# Patient Record
Sex: MALE | Race: WHITE | NOT HISPANIC OR LATINO | Employment: FULL TIME | URBAN - METROPOLITAN AREA
[De-identification: names, ages, dates, MRNs, and addresses within clinical notes are randomized per-mention and may not be internally consistent; named-entity substitution may affect disease eponyms.]

---

## 2019-10-08 ENCOUNTER — OFFICE VISIT (OUTPATIENT)
Dept: FAMILY MEDICINE | Facility: CLINIC | Age: 24
End: 2019-10-08
Payer: COMMERCIAL

## 2019-10-08 ENCOUNTER — LAB VISIT (OUTPATIENT)
Dept: LAB | Facility: HOSPITAL | Age: 24
End: 2019-10-08
Attending: FAMILY MEDICINE
Payer: COMMERCIAL

## 2019-10-08 VITALS
HEART RATE: 76 BPM | BODY MASS INDEX: 45.1 KG/M2 | OXYGEN SATURATION: 98 % | DIASTOLIC BLOOD PRESSURE: 78 MMHG | TEMPERATURE: 98 F | WEIGHT: 315 LBS | SYSTOLIC BLOOD PRESSURE: 126 MMHG | HEIGHT: 70 IN

## 2019-10-08 DIAGNOSIS — R63.5 ABNORMAL WEIGHT GAIN: ICD-10-CM

## 2019-10-08 DIAGNOSIS — D72.828 OTHER ELEVATED WHITE BLOOD CELL (WBC) COUNT: Primary | ICD-10-CM

## 2019-10-08 DIAGNOSIS — D72.828 OTHER ELEVATED WHITE BLOOD CELL (WBC) COUNT: ICD-10-CM

## 2019-10-08 DIAGNOSIS — E66.01 CLASS 3 SEVERE OBESITY DUE TO EXCESS CALORIES WITHOUT SERIOUS COMORBIDITY WITH BODY MASS INDEX (BMI) OF 45.0 TO 49.9 IN ADULT: ICD-10-CM

## 2019-10-08 DIAGNOSIS — R06.81 APNEA: ICD-10-CM

## 2019-10-08 DIAGNOSIS — R20.0 NUMBNESS AND TINGLING IN BOTH HANDS: ICD-10-CM

## 2019-10-08 DIAGNOSIS — R20.2 NUMBNESS AND TINGLING IN BOTH HANDS: ICD-10-CM

## 2019-10-08 DIAGNOSIS — R74.8 ABNORMAL LIVER ENZYMES: ICD-10-CM

## 2019-10-08 DIAGNOSIS — Z13.6 SCREENING FOR CARDIOVASCULAR CONDITION: ICD-10-CM

## 2019-10-08 DIAGNOSIS — R06.83 SNORING: ICD-10-CM

## 2019-10-08 PROBLEM — D72.829 LEUCOCYTOSIS: Status: ACTIVE | Noted: 2019-10-08

## 2019-10-08 PROBLEM — E66.813 CLASS 3 SEVERE OBESITY DUE TO EXCESS CALORIES WITHOUT SERIOUS COMORBIDITY WITH BODY MASS INDEX (BMI) OF 45.0 TO 49.9 IN ADULT: Status: ACTIVE | Noted: 2019-10-08

## 2019-10-08 LAB
ALBUMIN SERPL BCP-MCNC: 4.3 G/DL (ref 3.5–5.2)
ALP SERPL-CCNC: 43 U/L (ref 55–135)
ALT SERPL W/O P-5'-P-CCNC: 76 U/L (ref 10–44)
ANION GAP SERPL CALC-SCNC: 9 MMOL/L (ref 8–16)
AST SERPL-CCNC: 43 U/L (ref 10–40)
BASOPHILS # BLD AUTO: 0.04 K/UL (ref 0–0.2)
BASOPHILS NFR BLD: 0.4 % (ref 0–1.9)
BILIRUB SERPL-MCNC: 0.5 MG/DL (ref 0.1–1)
BUN SERPL-MCNC: 17 MG/DL (ref 6–20)
CALCIUM SERPL-MCNC: 9.8 MG/DL (ref 8.7–10.5)
CHLORIDE SERPL-SCNC: 103 MMOL/L (ref 95–110)
CHOLEST SERPL-MCNC: 191 MG/DL (ref 120–199)
CHOLEST/HDLC SERPL: 6.2 {RATIO} (ref 2–5)
CO2 SERPL-SCNC: 28 MMOL/L (ref 23–29)
CREAT SERPL-MCNC: 1.1 MG/DL (ref 0.5–1.4)
DIFFERENTIAL METHOD: ABNORMAL
EOSINOPHIL # BLD AUTO: 0.3 K/UL (ref 0–0.5)
EOSINOPHIL NFR BLD: 3 % (ref 0–8)
ERYTHROCYTE [DISTWIDTH] IN BLOOD BY AUTOMATED COUNT: 13 % (ref 11.5–14.5)
EST. GFR  (AFRICAN AMERICAN): >60 ML/MIN/1.73 M^2
EST. GFR  (NON AFRICAN AMERICAN): >60 ML/MIN/1.73 M^2
FOLATE SERPL-MCNC: 4.6 NG/ML (ref 4–24)
GLUCOSE SERPL-MCNC: 101 MG/DL (ref 70–110)
HCT VFR BLD AUTO: 46.4 % (ref 40–54)
HDLC SERPL-MCNC: 31 MG/DL (ref 40–75)
HDLC SERPL: 16.2 % (ref 20–50)
HGB BLD-MCNC: 14.7 G/DL (ref 14–18)
IMM GRANULOCYTES # BLD AUTO: 0.03 K/UL (ref 0–0.04)
IMM GRANULOCYTES NFR BLD AUTO: 0.3 % (ref 0–0.5)
INSULIN COLLECTION INTERVAL: NORMAL
INSULIN SERPL-ACNC: 21 UU/ML
LDLC SERPL CALC-MCNC: 122.6 MG/DL (ref 63–159)
LYMPHOCYTES # BLD AUTO: 2.7 K/UL (ref 1–4.8)
LYMPHOCYTES NFR BLD: 24.5 % (ref 18–48)
MCH RBC QN AUTO: 29.5 PG (ref 27–31)
MCHC RBC AUTO-ENTMCNC: 31.7 G/DL (ref 32–36)
MCV RBC AUTO: 93 FL (ref 82–98)
MONOCYTES # BLD AUTO: 0.7 K/UL (ref 0.3–1)
MONOCYTES NFR BLD: 6.5 % (ref 4–15)
NEUTROPHILS # BLD AUTO: 7.3 K/UL (ref 1.8–7.7)
NEUTROPHILS NFR BLD: 65.3 % (ref 38–73)
NONHDLC SERPL-MCNC: 160 MG/DL
NRBC BLD-RTO: 0 /100 WBC
PLATELET # BLD AUTO: 271 K/UL (ref 150–350)
PMV BLD AUTO: 10.7 FL (ref 9.2–12.9)
POTASSIUM SERPL-SCNC: 4.5 MMOL/L (ref 3.5–5.1)
PROT SERPL-MCNC: 8.1 G/DL (ref 6–8.4)
RBC # BLD AUTO: 4.99 M/UL (ref 4.6–6.2)
SODIUM SERPL-SCNC: 140 MMOL/L (ref 136–145)
TRIGL SERPL-MCNC: 187 MG/DL (ref 30–150)
TSH SERPL DL<=0.005 MIU/L-ACNC: 2.06 UIU/ML (ref 0.4–4)
VIT B12 SERPL-MCNC: 284 PG/ML (ref 210–950)
WBC # BLD AUTO: 11.16 K/UL (ref 3.9–12.7)

## 2019-10-08 PROCEDURE — 99999 PR PBB SHADOW E&M-NEW PATIENT-LVL III: ICD-10-PCS | Mod: PBBFAC,,, | Performed by: FAMILY MEDICINE

## 2019-10-08 PROCEDURE — 3008F PR BODY MASS INDEX (BMI) DOCUMENTED: ICD-10-PCS | Mod: CPTII,S$GLB,, | Performed by: FAMILY MEDICINE

## 2019-10-08 PROCEDURE — 82746 ASSAY OF FOLIC ACID SERUM: CPT

## 2019-10-08 PROCEDURE — 83525 ASSAY OF INSULIN: CPT

## 2019-10-08 PROCEDURE — 82607 VITAMIN B-12: CPT

## 2019-10-08 PROCEDURE — 3008F BODY MASS INDEX DOCD: CPT | Mod: CPTII,S$GLB,, | Performed by: FAMILY MEDICINE

## 2019-10-08 PROCEDURE — 99203 PR OFFICE/OUTPT VISIT, NEW, LEVL III, 30-44 MIN: ICD-10-PCS | Mod: S$GLB,,, | Performed by: FAMILY MEDICINE

## 2019-10-08 PROCEDURE — 85025 COMPLETE CBC W/AUTO DIFF WBC: CPT

## 2019-10-08 PROCEDURE — 99999 PR PBB SHADOW E&M-NEW PATIENT-LVL III: CPT | Mod: PBBFAC,,, | Performed by: FAMILY MEDICINE

## 2019-10-08 PROCEDURE — 80053 COMPREHEN METABOLIC PANEL: CPT

## 2019-10-08 PROCEDURE — 99203 OFFICE O/P NEW LOW 30 MIN: CPT | Mod: S$GLB,,, | Performed by: FAMILY MEDICINE

## 2019-10-08 PROCEDURE — 84443 ASSAY THYROID STIM HORMONE: CPT

## 2019-10-08 PROCEDURE — 80061 LIPID PANEL: CPT

## 2019-10-08 PROCEDURE — 36415 COLL VENOUS BLD VENIPUNCTURE: CPT | Mod: PO

## 2019-10-08 NOTE — PATIENT INSTRUCTIONS
Please follow the instructions below to securely access your online medical record. [facility] allows you to send messages to your doctor, view your test results, renew your prescriptions, schedule appointments, and more.     How Do I Sign Up?  1. In your Internet browser, go to [email or web address]  2. In the lower right of the page, click the Activate Now link located under the Have Access Code? .  3. Enter your [facility] Access Code exactly as it appears below. You will not need to use this code after youve completed the sign-up process.  [facility] Access Code: [email or web address]  Expires: 11/22/2019  8:40 AM    4. Enter Date of Birth (mm/dd/yyyy) as indicated and click the Next button. You will be taken to the next sign-up page.  5. Create a [facility] ID. This will be your new [facility] login ID and cannot be changed, so think of one that is secure and easy to remember.  6. Create a [facility] password.  Your password must be at least 8 characters long and contain at least 1 letter and 1 number.  You can change your password at any time.  Your password is case sensitive.  7. Enter your Password Reset Question and Answer, then click the Next button.   8. Enter your e-mail address. You will receive e-mail notification when new information is available in [facility].  9. Click Sign Up. You can now view your medical record.     Additional Information  If you have questions, you can e-mail [email or web address] or call [phone] to talk to our [facility] staff. Remember, [facility] is NOT to be used for urgent needs. For medical emergencies, dial 911.    Thank you for enrolling in [facility].         Eating Heart-Healthy Foods  Eating has a big impact on your heart health. In fact, eating healthier can improve several of your heart risks at once. For instance, it helps you manage weight, cholesterol, and blood pressure. Here are ideas to help you make heart-healthy changes without giving up all the foods  and flavors you love.  Getting started  · Talk with your health care provider about eating plans, such as the DASH or Mediterranean diet. You may also be referred to a dietitian.  · Change a few things at a time. Give yourself time to get used to a few eating changes before adding more.  · Work to create a tasty, healthy eating plan that you can stick to for the rest of your life.    Goals for healthy eating  Below are some tips to improve your eating habits:  · Limit saturated fats and trans fats. Saturated fats raise your levels of cholesterol, so keep these fats to a minimum. They are found in foods such as fatty meats, whole milk, cheese, and palm and coconut oils. Avoid trans fats because they lower good cholesterol as well as raise bad cholesterol. Trans fats are most often found in processed foods.  · Reduce sodium (salt) intake. Eating too much salt may increase your blood pressure. Limit your sodium intake to 2,300 milligrams (mg) per day, or less if your health care provider recommends it. Dining out less often and eating fewer processed foods are two great ways to decrease the amount of salt you consume.  · Managing calories. A calorie is a unit of energy. Your body burns calories for fuel, but if you eat more calories than your body burns, the extras are stored as fat. Your health care provider can help you create a diet plan to manage your calories. This will likely include eating healthier foods as well as exercising regularly. To help you track your progress, keep a diary to record what you eat and how often you exercise.  Choose the right foods  Aim to make these foods staples of your diet. If you have diabetes, you may have different recommendations than what is listed here:  · Fruits and vegetable provide plenty of nutrients without a lot of calories. At meals, fill half your plate with these foods. Split the other half of your plate between whole grains and lean protein.  · Whole grains are high  in fiber and rich in vitamins and nutrients. Good choices include whole-wheat bread, pasta, and brown rice.  · Lean proteins give you nutrition with less fat. Good choices include fish, skinless chicken, and beans.  · Low-fat or nonfat dairy provides nutrients without a lot of fat. Try low-fat or nonfat milk, cheese, or yogurt.  · Healthy fats can be good for you in small amounts. These are unsaturated fats, such as olive oil, nuts, and fish. Try to have at least 2 servings per week of fatty fish such as salmon, sardines, mackerel, rainbow trout, and albacore tuna. These contain omega-3 fatty acids, which are good for your heart. Flaxseed is another source of a heart-healthy fat.  More on heart healthy eating    Read food labels  Healthy eating starts at the grocery store. Be sure to pay attention to food labels on packaged foods. Look for products that are high in fiber and protein, and low in saturated fat, cholesterol, and sodium. Avoid products that contain trans fat. And pay close attention to serving size. For instance, if you plan to eat two servings, double all the numbers on the label.  Prepare food right  A key part of healthy cooking is cutting down on added fat and salt. Look on the internet for lower-fat, lower-sodium recipes. Also, try these tips:  · Remove fat from meat and skin from poultry before cooking.  · Skim fat from the surface of soups and sauces.  · Broil, boil, bake, steam, grill, and microwave food without added fats.  · Choose ingredients that spice up your food without adding calories, fat, or sodium. Try these items: horseradish, hot sauce, lemon, mustard, nonfat salad dressings, and vinegar. For salt-free herbs and spices, try basil, cilantro, cinnamon, pepper, and rosemary.  Date Last Reviewed: 6/25/2015  © 5612-7674 [employer]. [address] [place] [address], [place] [address]. All rights reserved. This information is not intended as a substitute for professional medical care.  Always follow your healthcare professional's instructions.        Eating Heart-Healthy Food: Using the DASH Plan    Eating for your heart doesnt have to be hard or boring. You just need to know how to make healthier choices. The DASH eating plan has been developed to help you do just that. DASH stands for Dietary Approaches to Stop Hypertension. It is a plan that has been proven to be healthier for your heart and to lower your risk for high blood pressure. It can also help lower your risk for cancer, heart disease, osteoporosis, and diabetes.  Choosing from each food group  Choose foods from each of the food groups below each day. Try to get the recommended number of servings for each food group. The serving numbers are based on a diet of 2,000 calories a day. Talk to your doctor if youre unsure about your calorie needs. Along with getting the correct servings, the DASH plan also recommends a sodium intake less than 2,300 mg per day.        Grains  Servings: 6 to 8 a day  A serving is:  · 1 slice bread  · 1 ounce dry cereal  · Half a cup cooked rice, pasta or cereal  Best choices: Whole grains and any grains high in fiber. Vegetables  Servings: 4 to 5 a day  A serving is:  · 1 cup raw leafy vegetable  · Half a cup cut-up raw or cooked vegetable  · Half a cup vegetable juice  Best choices: Fresh or frozen vegetables prepared without added salt or fat.   Fruits  Servings: 4 to 5 a day  A serving is:  · 1 medium fruit  · One-quarter cup dried fruit  · Half a cup fresh, frozen, or canned fruit  · Half a cup of 100% fruit juices  Best choices: A variety of fresh fruits of different colors. Whole fruits are a better choice than fruit juices. Low-fat or fat-free dairy  Servings: 2 to 3 a day  A serving is:  · 1 cup milk  · 1 cup yogurt  · One and a half ounces cheese  Best choices: Skim or 1% milk, low-fat or fat-free yogurt or buttermilk, and low-fat cheeses.         Lean meats, poultry, fish  Servings: 6 or fewer a  day  A serving is:  · 1 ounce cooked meats, poultry, or fish  · 1 egg  Best choices: Lean poultry and fish. Trim away visible fat. Broil, grill, roast, or boil instead of frying. Remove skin from poultry before eating. Limit how much red meat you eat.  Nuts, seeds, beans  Servings: 4 to 5 a week  A serving is:  · One-third cup nuts (one and a half ounces)  · 2 tablespoons nut butter or seeds  · Half a cup cooked dry beans or legumes  Best choices: Dry roasted nuts with no salt added, lentils, kidney beans, garbanzo beans, and whole ramírez beans.   Fats and oils  Servings: 2 to 3 a day  A serving is:  · 1 teaspoon vegetable oil  · 1 teaspoon soft margarine  · 1 tablespoon mayonnaise  · 2 tablespoons salad dressing  Best choices: Nut and vegetable oils (nontropical vegetable oils), such as olive and canola oil. Sweets  Servings: 5 a week or fewer  A serving is:  · 1 tablespoon sugar, maple syrup, or honey  · 1 tablespoon jam or jelly  · 1 half-ounce jelly beans (about 15)  · 1 cup lemonade  Best choices: Dried fruit can be a satisfying sweet. Choose low-fat sweets. And watch your serving sizes!      For more on the DASH eating plan, visit:  www.nhlbi.nih.gov/health/health-topics/topics/dash   Date Last Reviewed: 6/1/2016 © 2000-2017 [employer]. [address] [place] [address], [place] [address]. All rights reserved. This information is not intended as a substitute for professional medical care. Always follow your healthcare professional's instructions.

## 2019-10-09 DIAGNOSIS — R74.8 INCREASED LIVER ENZYMES: ICD-10-CM

## 2019-10-09 DIAGNOSIS — D72.828 OTHER ELEVATED WHITE BLOOD CELL (WBC) COUNT: Primary | ICD-10-CM

## 2019-10-25 DIAGNOSIS — G47.33 OBSTRUCTIVE SLEEP APNEA SYNDROME: Primary | ICD-10-CM

## 2020-01-15 ENCOUNTER — OFFICE VISIT (OUTPATIENT)
Dept: ORTHOPEDICS | Facility: CLINIC | Age: 25
End: 2020-01-15
Payer: COMMERCIAL

## 2020-01-15 ENCOUNTER — HOSPITAL ENCOUNTER (OUTPATIENT)
Dept: RADIOLOGY | Facility: HOSPITAL | Age: 25
Discharge: HOME | End: 2020-01-15
Attending: ORTHOPAEDIC SURGERY
Payer: COMMERCIAL

## 2020-01-15 DIAGNOSIS — M25.522 PAIN IN LEFT ELBOW: ICD-10-CM

## 2020-01-15 DIAGNOSIS — M25.522 PAIN IN LEFT ELBOW: Primary | ICD-10-CM

## 2020-01-15 DIAGNOSIS — R20.0 LEFT UPPER EXTREMITY NUMBNESS: ICD-10-CM

## 2020-01-15 PROCEDURE — 73080 X-RAY EXAM OF ELBOW: CPT | Mod: 26,LT,, | Performed by: RADIOLOGY

## 2020-01-15 PROCEDURE — 99999 PR PBB SHADOW E&M-EST. PATIENT-LVL III: ICD-10-PCS | Mod: PBBFAC,,, | Performed by: ORTHOPAEDIC SURGERY

## 2020-01-15 PROCEDURE — 99203 OFFICE O/P NEW LOW 30 MIN: CPT | Mod: S$GLB,,, | Performed by: ORTHOPAEDIC SURGERY

## 2020-01-15 PROCEDURE — 99999 PR PBB SHADOW E&M-EST. PATIENT-LVL III: CPT | Mod: PBBFAC,,, | Performed by: ORTHOPAEDIC SURGERY

## 2020-01-15 PROCEDURE — 99203 PR OFFICE/OUTPT VISIT, NEW, LEVL III, 30-44 MIN: ICD-10-PCS | Mod: S$GLB,,, | Performed by: ORTHOPAEDIC SURGERY

## 2020-01-15 PROCEDURE — 73080 XR ELBOW COMPLETE 3 VIEW LEFT: ICD-10-PCS | Mod: 26,LT,, | Performed by: RADIOLOGY

## 2020-01-15 PROCEDURE — 73080 X-RAY EXAM OF ELBOW: CPT | Mod: TC,PO,LT

## 2020-01-15 NOTE — PROGRESS NOTES
1/15/2020    Chief Complaint:  Chief Complaint   Patient presents with    Arm Problem     pt c/o pain and numbness in OBED after hitting elbow on a beam over a month ago       HPI:  Christiano Aguayo is a 24 y.o. male, who presents to clinic today for evaluation of his left elbow pain, Arm, forearm and hand.  He states that this began after he was pulling on a beam while at work and hit his left elbow.  States that this was a jerking type activity.  He states that he did not immediately have severe pain but that over the next 1-2 days he began to develop pain which focused over the elbow but was also a numbness and tingling feeling over the left arm forearm and hand.  He is here today for evaluation.  He has no other complaints.    PMHX:  Past Medical History:   Diagnosis Date    Asthma        PSHX:  Past Surgical History:   Procedure Laterality Date    ADENOIDECTOMY      APPENDECTOMY         FMHX:  Family History   Problem Relation Age of Onset    Diabetes Mother     Diabetes Father     Hyperlipidemia Father     Hypertension Father     Arthritis Father     Asthma Father     Depression Father     Hearing loss Father     Heart disease Father     Kidney disease Father     Adrenal disorder Father     Gout Father        SOCHX:  Social History     Tobacco Use    Smoking status: Never Smoker    Smokeless tobacco: Current User     Types: Snuff   Substance Use Topics    Alcohol use: Yes     Frequency: Monthly or less     Drinks per session: 3 or 4     Binge frequency: Less than monthly     Comment: rarely       ALLERGIES:  Patient has no known allergies.    CURRENT MEDICATIONS:  No current outpatient medications on file prior to visit.     No current facility-administered medications on file prior to visit.        REVIEW OF SYSTEMS:  Review of Systems   Constitutional: Negative for chills and fever.   HENT: Negative for ear pain, nosebleeds and sore throat.    Eyes: Negative for pain and discharge.    Respiratory: Negative for shortness of breath and wheezing.    Cardiovascular: Negative for chest pain and palpitations.   Gastrointestinal: Negative for heartburn, nausea and vomiting.   Genitourinary: Negative for dysuria and urgency.   Skin: Positive for itching. Negative for rash.   Neurological: Positive for tingling. Negative for seizures and headaches.        In left hand       GENERAL PHYSICAL EXAM:   There were no vitals taken for this visit.   GEN: well developed, well nourished, no acute distress   HENT: Normocephalic, atraumatic   EYES: No discharge, conjunctiva normal   NECK: Supple, non-tender   PULM: No wheezing, no respiratory distress   CV: RRR   ABD: Soft, non-tender    ORTHO EXAM:   Examination of the left elbow reveals that there is no edema or skin changes.  Palpation about the elbow does not produce tenderness.  Elbow motion is full and strength is 5/5 with resistance.  He does report tingling in the radial distribution of the forearm and hand.  Median and ulnar sensation intact.  Positive left Hammond's test.  Capillary refill less than 2 seconds in all digits, radial pulse 2+    RADIOLOGY:   Xray of the left elbow obtained in clinic today and personally reviewed.  There are no fractures or loose bodies noted    ASSESSMENT:   Left upper extremity numbness    PLAN:  1. I have explained to Mr Aguayo that I do not see evidence of injury at the level of his elbow or wrist.  I would like him to be evaluated by back and spine to rule out nerve compression at the level of the spine.    2.  I have placed a referral and scheduled him an appointment for consultation with back and spine.    3.  Follow up on a prn basis

## 2020-01-28 ENCOUNTER — HOSPITAL ENCOUNTER (OUTPATIENT)
Dept: RADIOLOGY | Facility: HOSPITAL | Age: 25
Discharge: HOME | End: 2020-01-28
Attending: PHYSICIAN ASSISTANT
Payer: COMMERCIAL

## 2020-01-28 ENCOUNTER — OFFICE VISIT (OUTPATIENT)
Dept: SPINE | Facility: CLINIC | Age: 25
End: 2020-01-28
Payer: COMMERCIAL

## 2020-01-28 VITALS
WEIGHT: 315 LBS | SYSTOLIC BLOOD PRESSURE: 135 MMHG | HEIGHT: 70 IN | HEART RATE: 82 BPM | BODY MASS INDEX: 45.1 KG/M2 | DIASTOLIC BLOOD PRESSURE: 68 MMHG

## 2020-01-28 DIAGNOSIS — M54.12 CERVICAL RADICULOPATHY: ICD-10-CM

## 2020-01-28 DIAGNOSIS — M54.12 CERVICAL RADICULOPATHY: Primary | ICD-10-CM

## 2020-01-28 PROCEDURE — 72141 MRI NECK SPINE W/O DYE: CPT | Mod: TC

## 2020-01-28 PROCEDURE — 3008F PR BODY MASS INDEX (BMI) DOCUMENTED: ICD-10-PCS | Mod: CPTII,S$GLB,, | Performed by: PHYSICIAN ASSISTANT

## 2020-01-28 PROCEDURE — 72141 MRI CERVICAL SPINE WITHOUT CONTRAST: ICD-10-PCS | Mod: 26,,, | Performed by: RADIOLOGY

## 2020-01-28 PROCEDURE — 72141 MRI NECK SPINE W/O DYE: CPT | Mod: 26,,, | Performed by: RADIOLOGY

## 2020-01-28 PROCEDURE — 72052 X-RAY EXAM NECK SPINE 6/>VWS: CPT | Mod: 26,,, | Performed by: RADIOLOGY

## 2020-01-28 PROCEDURE — 99999 PR PBB SHADOW E&M-EST. PATIENT-LVL III: CPT | Mod: PBBFAC,,, | Performed by: PHYSICIAN ASSISTANT

## 2020-01-28 PROCEDURE — 72052 XR CERVICAL SPINE 5 VIEW WITH FLEX AND EXT: ICD-10-PCS | Mod: 26,,, | Performed by: RADIOLOGY

## 2020-01-28 PROCEDURE — 3008F BODY MASS INDEX DOCD: CPT | Mod: CPTII,S$GLB,, | Performed by: PHYSICIAN ASSISTANT

## 2020-01-28 PROCEDURE — 99999 PR PBB SHADOW E&M-EST. PATIENT-LVL III: ICD-10-PCS | Mod: PBBFAC,,, | Performed by: PHYSICIAN ASSISTANT

## 2020-01-28 PROCEDURE — 72052 X-RAY EXAM NECK SPINE 6/>VWS: CPT | Mod: TC,FY

## 2020-01-28 PROCEDURE — 99203 PR OFFICE/OUTPT VISIT, NEW, LEVL III, 30-44 MIN: ICD-10-PCS | Mod: S$GLB,,, | Performed by: PHYSICIAN ASSISTANT

## 2020-01-28 PROCEDURE — 99203 OFFICE O/P NEW LOW 30 MIN: CPT | Mod: S$GLB,,, | Performed by: PHYSICIAN ASSISTANT

## 2020-01-28 NOTE — Clinical Note
Dr. Wyatt - Can you please review Mr. Alvarado's cervical MRI and give me your thoughts on any possible demyelinating disease?  When Dr. Smiley read the study, she thought there may have been some around the area of C5.  His clinic note is in epic - he presented with acute onset left C6 patten numbness and weakness in the arm when trying to perform strenous tasks at work that he has not had trouble with in the past.  I was not able to detect any weakness in the clinic.  The MRI does not show any major C6 nerve compression.  So I am wondering if the demylinating disease is real and if I should proceed with further imaging.  I will discuss EMG/ NCV with him at his follow up.Smitha [phone] (cell number)

## 2020-01-29 NOTE — PROGRESS NOTES
Back and Spine Consult    Patient ID: Christiano Aguayo is a 24 y.o. male.    Chief Complaint   Patient presents with    Numbness     He has had numbness in his left arm and weakness since November 2019 after hitting his elbow at work on a beam. He has numbness only on the top side of his left arm from his left hand to his left shoulder. His thumb and first finger on the left hand are numb. He has pain when working and driving for the last 2 weeks. Pain comes and goes. Nothing helps numbness. Relaxing arm helps pain.       Review of Systems   Constitutional: Negative for activity change, chills, fatigue and unexpected weight change.   HENT: Negative for hearing loss, tinnitus, trouble swallowing and voice change.    Eyes: Negative for visual disturbance.   Respiratory: Negative for apnea, chest tightness and shortness of breath.    Cardiovascular: Negative for chest pain and palpitations.   Gastrointestinal: Negative for abdominal pain, constipation, diarrhea, nausea and vomiting.   Genitourinary: Negative for difficulty urinating, dysuria and frequency.   Musculoskeletal: Positive for myalgias and neck pain. Negative for back pain, gait problem and neck stiffness.   Skin: Negative for wound.   Neurological: Positive for numbness. Negative for dizziness, tremors, seizures, facial asymmetry, speech difficulty, weakness, light-headedness and headaches.   Psychiatric/Behavioral: Negative for confusion and decreased concentration.       Past Medical History:   Diagnosis Date    Asthma      Social History     Socioeconomic History    Marital status: Single     Spouse name: Not on file    Number of children: Not on file    Years of education: Not on file    Highest education level: Not on file   Occupational History    Not on file   Social Needs    Financial resource strain: Not on file    Food insecurity:     Worry: Not on file     Inability: Not on file    Transportation needs:     Medical: Not on file      "Non-medical: Not on file   Tobacco Use    Smoking status: Never Smoker    Smokeless tobacco: Current User     Types: Snuff   Substance and Sexual Activity    Alcohol use: Yes     Frequency: Monthly or less     Drinks per session: 3 or 4     Binge frequency: Less than monthly     Comment: rarely    Drug use: Never    Sexual activity: Yes     Partners: Female     Birth control/protection: None   Lifestyle    Physical activity:     Days per week: Not on file     Minutes per session: Not on file    Stress: Only a little   Relationships    Social connections:     Talks on phone: Not on file     Gets together: Not on file     Attends Hinduism service: Not on file     Active member of club or organization: Not on file     Attends meetings of clubs or organizations: Not on file     Relationship status: Not on file   Other Topics Concern    Not on file   Social History Narrative    Not on file     Family History   Problem Relation Age of Onset    Diabetes Mother     Diabetes Father     Hyperlipidemia Father     Hypertension Father     Arthritis Father     Asthma Father     Depression Father     Hearing loss Father     Heart disease Father     Kidney disease Father     Adrenal disorder Father     Gout Father      Review of patient's allergies indicates:  No Known Allergies  No current outpatient medications on file.    Vitals:    01/28/20 1513   BP: 135/68   BP Location: Right arm   Patient Position: Sitting   BP Method: Large (Automatic)   Pulse: 82   Weight: (!) 145.9 kg (321 lb 12.2 oz)   Height: 5' 10" (1.778 m)       Physical Exam   Constitutional: He is oriented to person, place, and time. He appears well-developed and well-nourished.   HENT:   Head: Normocephalic and atraumatic.   Eyes: Pupils are equal, round, and reactive to light.   Neck: Normal range of motion. Neck supple.   Cardiovascular: Normal rate.   Pulmonary/Chest: Effort normal.   Abdominal: He exhibits no distension. "   Musculoskeletal: Normal range of motion. He exhibits no edema.   Neurological: He is alert and oriented to person, place, and time. He has a normal Finger-Nose-Finger Test, a normal Heel to Shin Test, a normal Romberg Test and a normal Tandem Gait Test. Gait normal.   Reflex Scores:       Tricep reflexes are 1+ on the right side and 1+ on the left side.       Bicep reflexes are 1+ on the right side and 1+ on the left side.       Brachioradialis reflexes are 1+ on the right side and 1+ on the left side.       Patellar reflexes are 1+ on the right side and 1+ on the left side.       Achilles reflexes are 1+ on the right side and 1+ on the left side.  Skin: Skin is warm and dry.   Psychiatric: He has a normal mood and affect. His speech is normal and behavior is normal. Judgment and thought content normal.   Nursing note and vitals reviewed.      Neurologic Exam     Mental Status   Oriented to person, place, and time.   Oriented to person.   Oriented to place.   Oriented to time.   Follows 3 step commands.   Attention: normal. Concentration: normal.   Speech: speech is normal   Level of consciousness: alert  Knowledge: consistent with education.   Able to name object. Able to read. Able to repeat. Able to write. Normal comprehension.     Cranial Nerves     CN II   Visual acuity: normal  Right visual field deficit: none  Left visual field deficit: none     CN III, IV, VI   Pupils are equal, round, and reactive to light.  Right pupil: Size: 3 mm. Shape: regular. Reactivity: brisk. Consensual response: intact.   Left pupil: Size: 3 mm. Shape: regular. Reactivity: brisk. Consensual response: intact.   CN III: no CN III palsy  CN VI: no CN VI palsy  Nystagmus: none   Diplopia: none  Ophthalmoparesis: none  Conjugate gaze: present    CN V   Right facial sensation deficit: none  Left facial sensation deficit: none    CN VII   Right facial weakness: none  Left facial weakness: none    CN VIII   Hearing: intact    CN IX, X    CN IX normal.   CN X normal.     CN XI   Right sternocleidomastoid strength: normal  Left sternocleidomastoid strength: normal  Right trapezius strength: normal  Left trapezius strength: normal    CN XII   Fasciculations: absent  Tongue deviation: none    Motor Exam   Muscle bulk: normal  Overall muscle tone: normal  Right arm pronator drift: absent  Left arm pronator drift: absent    Strength   Right neck flexion: 5/5  Left neck flexion: 5/5  Right neck extension: 5/5  Left neck extension: 5/5  Right deltoid: 5/5  Left deltoid: 5/5  Right biceps: 5/5  Left biceps: 5/5  Right triceps: 5/5  Left triceps: 5/5  Right wrist flexion: 5/5  Left wrist flexion: 5/5  Right wrist extension: 5/5  Left wrist extension: 5/5  Right interossei: 5/5  Left interossei: 5/5  Right abdominals: 5/5  Left abdominals: 5/5  Right iliopsoas: 5/5  Left iliopsoas: 5/5  Right quadriceps: 5/5  Left quadriceps: 5/5  Right hamstrin/5  Left hamstrin/5  Right glutei: 5/5  Left glutei: 5/5  Right anterior tibial: 5/5  Left anterior tibial: 5/5  Right posterior tibial: 5/5  Left posterior tibial: 5/5  Right peroneal: 5/5  Left peroneal: 5/5  Right gastroc: 5/5  Left gastroc: 5/5    Sensory Exam   Right arm light touch: normal  Left arm light touch: normal  Right leg light touch: normal  Left leg light touch: normal  Right arm vibration: normal  Left arm vibration: normal  Right arm pinprick: normal  Left arm pinprick: normal  Sensory deficit distribution on left: C6    Gait, Coordination, and Reflexes     Gait  Gait: normal    Coordination   Romberg: negative  Finger to nose coordination: normal  Heel to shin coordination: normal  Tandem walking coordination: normal    Tremor   Resting tremor: absent  Intention tremor: absent  Action tremor: absent    Reflexes   Right brachioradialis: 1+  Left brachioradialis: 1+  Right biceps: 1+  Left biceps: 1+  Right triceps: 1+  Left triceps: 1+  Right patellar: 1+  Left patellar: 1+  Right achilles:  1+  Left achilles: 1+  Right Hammond: absent  Left Hammond: absent  Right ankle clonus: absent  Left ankle clonus: absent      Provider dictation:  24 year old obese right handed male with asthma is referred by Viky Medina for evaluation of left arm pain/ numbness.  Symptoms first started in November 2019.  He thinks it started 1-2 days after pulling on a beam and hitting the left elbow while at work (this is not workers compensation).  He was evaluated by ortho-hand with no focal wrist or elbow cause identified.  He feels pain and numbness in the neck with radiation into the left arm to the hand affecting digits 1,2.  He feels weak in the left arm when using the arm - such as using a sledge hammer, climbing ladders and carrying heavy objects.  He is not taking any medications for pain.  He has not had PT or ERIK.    NDI:  Not completed.  PHQ:  6.    On exam, he has decreased sensation in a left C6 distribution.  He has 5/5 strength throughout and I am not able to detect any weaknesses throughout the upper or lower extremities.  1+ DTR throughout.  Obese.  Gait and station fluid.  Denies loss of bowel/ bladder control.    He has not had any imaging.    In light of sudden onset numbness in the left arm in a C6 pattern, I recommend obtaining xray and MRI of the cervical spine to further assess for any neural compression at this area.  Follow up in clinic after imaging is complete.  We discussed taking anti-inflammatories and/ or muscle relaxants to help with pain.  He declines to use any medications.      Visit Diagnosis:  Cervical radiculopathy  -     MRI Cervical Spine Without Contrast; Future; Expected date: 01/28/2020  -     X-Ray Cervical Spine 5 View W Flex Extxt; Future; Expected date: 01/28/2020        Total time spent counseling greater than fifty percent of total visit time.  Counseling included discussion regarding imaging findings, diagnosis possibilities, treatment options, risks and benefits.   The  patient had many questions regarding the options and long-term effects.

## 2020-02-03 ENCOUNTER — TELEPHONE (OUTPATIENT)
Dept: NEUROLOGY | Facility: CLINIC | Age: 25
End: 2020-02-03

## 2020-02-03 NOTE — TELEPHONE ENCOUNTER
----- Message from Smitha Clay PA-C sent at 2/3/2020  8:38 AM CST -----  Regarding: advice needed - possible demylinating diseas  Dr. Wyatt -   This is the same message I sent to you last week.  He is scheduled to follow up in clinic with me tomorrow so I am hoping you may be able to look at if before then?    Can you please review Mr. Alvarado's cervical MRI and give me your thoughts on any possible demyelinating disease?  When Dr. Smiley read the study, she thought there may have been some around the area of C5.  His clinic note is in epic - he presented with acute onset left C6 patten numbness and weakness in the arm when trying to perform strenous tasks at work that he has not had trouble with in the past.  I was not able to detect any weakness in the clinic.  The MRI does not show any major C6 nerve compression.  So I am wondering if the demylinating disease is real and if I should proceed with further imaging.  I will discuss EMG/ NCV with him at his follow up.    Smitha Clay PA-C  [facility]  [phone] (cell number)

## 2020-02-04 ENCOUNTER — OFFICE VISIT (OUTPATIENT)
Dept: SPINE | Facility: CLINIC | Age: 25
End: 2020-02-04
Payer: COMMERCIAL

## 2020-02-04 ENCOUNTER — TELEPHONE (OUTPATIENT)
Dept: NEUROLOGY | Facility: CLINIC | Age: 25
End: 2020-02-04

## 2020-02-04 VITALS
DIASTOLIC BLOOD PRESSURE: 83 MMHG | HEIGHT: 70 IN | WEIGHT: 315 LBS | BODY MASS INDEX: 45.1 KG/M2 | SYSTOLIC BLOOD PRESSURE: 128 MMHG | HEART RATE: 81 BPM

## 2020-02-04 DIAGNOSIS — G37.9 DEMYELINATING LESION: Primary | ICD-10-CM

## 2020-02-04 DIAGNOSIS — R20.0 LEFT ARM NUMBNESS: ICD-10-CM

## 2020-02-04 PROCEDURE — 3008F BODY MASS INDEX DOCD: CPT | Mod: CPTII,S$GLB,, | Performed by: PHYSICIAN ASSISTANT

## 2020-02-04 PROCEDURE — 99999 PR PBB SHADOW E&M-EST. PATIENT-LVL IV: ICD-10-PCS | Mod: PBBFAC,,, | Performed by: PHYSICIAN ASSISTANT

## 2020-02-04 PROCEDURE — 99999 PR PBB SHADOW E&M-EST. PATIENT-LVL IV: CPT | Mod: PBBFAC,,, | Performed by: PHYSICIAN ASSISTANT

## 2020-02-04 PROCEDURE — 99214 PR OFFICE/OUTPT VISIT, EST, LEVL IV, 30-39 MIN: ICD-10-PCS | Mod: S$GLB,,, | Performed by: PHYSICIAN ASSISTANT

## 2020-02-04 PROCEDURE — 99214 OFFICE O/P EST MOD 30 MIN: CPT | Mod: S$GLB,,, | Performed by: PHYSICIAN ASSISTANT

## 2020-02-04 PROCEDURE — 3008F PR BODY MASS INDEX (BMI) DOCUMENTED: ICD-10-PCS | Mod: CPTII,S$GLB,, | Performed by: PHYSICIAN ASSISTANT

## 2020-02-04 NOTE — TELEPHONE ENCOUNTER
----- Message from Carmina Gilmore sent at 2/4/2020  3:15 PM CST -----  Contact: pt  Reason: Calling to schedue appt for Demyelinating lesion(referral placed) Nothing in Epic.    Communication; [phone]

## 2020-02-06 NOTE — PROGRESS NOTES
Back and Spine Follow Up    Patient ID: Christiano Aguayo is a 24 y.o. male.    Chief Complaint   Patient presents with    Follow-up     MRI and Xray results       Review of Systems   Constitutional: Negative for activity change, chills, fatigue and unexpected weight change.   HENT: Negative for hearing loss, tinnitus, trouble swallowing and voice change.    Eyes: Negative for visual disturbance.   Respiratory: Negative for apnea, chest tightness and shortness of breath.    Cardiovascular: Negative for chest pain and palpitations.   Gastrointestinal: Negative for abdominal pain, constipation, diarrhea, nausea and vomiting.   Genitourinary: Negative for difficulty urinating, dysuria and frequency.   Musculoskeletal: Positive for myalgias and neck pain. Negative for back pain, gait problem and neck stiffness.   Skin: Negative for wound.   Neurological: Positive for numbness. Negative for dizziness, tremors, seizures, facial asymmetry, speech difficulty, weakness, light-headedness and headaches.   Psychiatric/Behavioral: Negative for confusion and decreased concentration.       Past Medical History:   Diagnosis Date    Asthma      Social History     Socioeconomic History    Marital status: Single     Spouse name: Not on file    Number of children: Not on file    Years of education: Not on file    Highest education level: Not on file   Occupational History    Not on file   Social Needs    Financial resource strain: Not on file    Food insecurity:     Worry: Not on file     Inability: Not on file    Transportation needs:     Medical: Not on file     Non-medical: Not on file   Tobacco Use    Smoking status: Never Smoker    Smokeless tobacco: Current User     Types: Snuff   Substance and Sexual Activity    Alcohol use: Yes     Frequency: Monthly or less     Drinks per session: 3 or 4     Binge frequency: Less than monthly     Comment: rarely    Drug use: Never    Sexual activity: Yes     Partners: Female      "Birth control/protection: None   Lifestyle    Physical activity:     Days per week: Not on file     Minutes per session: Not on file    Stress: Only a little   Relationships    Social connections:     Talks on phone: Not on file     Gets together: Not on file     Attends [employer] service: Not on file     Active member of club or organization: Not on file     Attends meetings of clubs or organizations: Not on file     Relationship status: Not on file   Other Topics Concern    Not on file   Social History Narrative    Not on file     Family History   Problem Relation Age of Onset    Diabetes Mother     Diabetes Father     Hyperlipidemia Father     Hypertension Father     Arthritis Father     Asthma Father     Depression Father     Hearing loss Father     Heart disease Father     Kidney disease Father     Adrenal disorder Father     Gout Father      Review of patient's allergies indicates:  No Known Allergies  No current outpatient medications on file.    Vitals:    02/04/20 1423   BP: 128/83   BP Location: Left arm   Patient Position: Sitting   BP Method: Large (Automatic)   Pulse: 81   Weight: (!) 145.9 kg (321 lb 10.4 oz)   Height: 5' 10" (1.778 m)       Physical Exam   Constitutional: He is oriented to person, place, and time. He appears well-developed and well-nourished.   HENT:   Head: Normocephalic and atraumatic.   Eyes: Pupils are equal, round, and reactive to light.   Neck: Normal range of motion. Neck supple.   Cardiovascular: Normal rate.   Pulmonary/Chest: Effort normal.   Abdominal: He exhibits no distension.   Musculoskeletal: Normal range of motion. He exhibits no edema.   Neurological: He is alert and oriented to person, place, and time. He has a normal Finger-Nose-Finger Test, a normal Heel to Shin Test, a normal Romberg Test and a normal Tandem Gait Test. Gait normal.   Reflex Scores:       Tricep reflexes are 1+ on the right side and 1+ on the left side.       Bicep reflexes are 1+ " on the right side and 1+ on the left side.       Brachioradialis reflexes are 1+ on the right side and 1+ on the left side.       Patellar reflexes are 1+ on the right side and 1+ on the left side.       Achilles reflexes are 1+ on the right side and 1+ on the left side.  Skin: Skin is warm and dry.   Psychiatric: He has a normal mood and affect. His speech is normal and behavior is normal. Judgment and thought content normal.   Nursing note and vitals reviewed.      Neurologic Exam     Mental Status   Oriented to person, place, and time.   Oriented to person.   Oriented to place.   Oriented to time.   Follows 3 step commands.   Attention: normal. Concentration: normal.   Speech: speech is normal   Level of consciousness: alert  Knowledge: consistent with education.   Able to name object. Able to read. Able to repeat. Able to write. Normal comprehension.     Cranial Nerves     CN II   Visual acuity: normal  Right visual field deficit: none  Left visual field deficit: none     CN III, IV, VI   Pupils are equal, round, and reactive to light.  Right pupil: Size: 3 mm. Shape: regular. Reactivity: brisk. Consensual response: intact.   Left pupil: Size: 3 mm. Shape: regular. Reactivity: brisk. Consensual response: intact.   CN III: no CN III palsy  CN VI: no CN VI palsy  Nystagmus: none   Diplopia: none  Ophthalmoparesis: none  Conjugate gaze: present    CN V   Right facial sensation deficit: none  Left facial sensation deficit: none    CN VII   Right facial weakness: none  Left facial weakness: none    CN VIII   Hearing: intact    CN IX, X   CN IX normal.   CN X normal.     CN XI   Right sternocleidomastoid strength: normal  Left sternocleidomastoid strength: normal  Right trapezius strength: normal  Left trapezius strength: normal    CN XII   Fasciculations: absent  Tongue deviation: none    Motor Exam   Muscle bulk: normal  Overall muscle tone: normal  Right arm pronator drift: absent  Left arm pronator drift:  absent    Strength   Right neck flexion: 5/5  Left neck flexion: 5/5  Right neck extension: 5/5  Left neck extension: 5/5  Right deltoid: 5/5  Left deltoid: 5/5  Right biceps: 5/5  Left biceps: 5/5  Right triceps: 5/5  Left triceps: 5/5  Right wrist flexion: 5/5  Left wrist flexion: 5/5  Right wrist extension: 5/5  Left wrist extension: 5/5  Right interossei: 5/5  Left interossei: 5/5  Right abdominals: 5/5  Left abdominals: 5/5  Right iliopsoas: 5/5  Left iliopsoas: 5/5  Right quadriceps: 5/5  Left quadriceps: 5/5  Right hamstrin/5  Left hamstrin/5  Right glutei: 5/5  Left glutei: 5/5  Right anterior tibial: 5/5  Left anterior tibial: 5/5  Right posterior tibial: 5/5  Left posterior tibial: 5/5  Right peroneal: 5/5  Left peroneal: 5/5  Right gastroc: 5/5  Left gastroc: 5/5    Sensory Exam   Right arm light touch: normal  Left arm light touch: normal  Right leg light touch: normal  Left leg light touch: normal  Right arm vibration: normal  Left arm vibration: normal  Right arm pinprick: normal  Left arm pinprick: normal  Sensory deficit distribution on left: C6    Gait, Coordination, and Reflexes     Gait  Gait: normal    Coordination   Romberg: negative  Finger to nose coordination: normal  Heel to shin coordination: normal  Tandem walking coordination: normal    Tremor   Resting tremor: absent  Intention tremor: absent  Action tremor: absent    Reflexes   Right brachioradialis: 1+  Left brachioradialis: 1+  Right biceps: 1+  Left biceps: 1+  Right triceps: 1+  Left triceps: 1+  Right patellar: 1+  Left patellar: 1+  Right achilles: 1+  Left achilles: 1+  Right Hammond: absent  Left Hammond: absent  Right ankle clonus: absent  Left ankle clonus: absent      Provider dictation:  24 year old obese right handed male with asthma presents for follow up of left arm numbness after undergoing MRI to assess for any neural compression contributing to numbness.  Symptoms first started in 2019.  He thinks it  started 1-2 days after pulling on a beam and hitting the left elbow while at work (this is not workers compensation).  He was evaluated by ortho-hand with no focal wrist or elbow cause identified.  He feels pain and numbness in the neck with radiation into the left arm to the hand affecting digits 1,2.  He feels weak in the left arm when using the arm - such as using a sledge hammer, climbing ladders and carrying heavy objects.  He is not taking any medications for pain.  He has not had PT or ERIK.  Day also describes a significant amount of isolated left shoulder pain.  He continues to feel weak through the left arm.  NDI:  Not completed.  PHQ:  6.    On exam, he has decreased sensation in a left C6 distribution.  He has 5/5 strength throughout and I am not able to detect any weaknesses throughout the upper or lower extremities.  1+ DTR throughout.  Obese.  Gait and station fluid.  Denies loss of bowel/ bladder control.    X-ray and MRI cervical spine taken 01/28/2020 reviewed.  There is straightening of the cervical lordosis with no evidence of instability on flexion or extension.  There is no evidence of cord compression, however abnormal STIR and T2 hyperintense signals are seen in the left posterolateral cord over the level of C5 measuring approximately 2 cm in length.  Mild degenerative changes seen throughout the cervical spine with mild foraminal narrowing at C3-4 and C4-5.  There is no significant left foraminal narrowing at C5-6 or C6-7 the would correlate with his left arm symptoms.    Regarding focal left C6, C7 distribution of numbness, there is no focal foraminal narrowing/nerve compression seen at C5-6 her C6-7 to correlate with his numbness and subjective weakness with doing heavy physical work.  The left arm continues to significantly bother him.  Recommend obtaining a EMG nerve conduction test to further assess for any peripheral neuropathy, plexopathy, radiculopathy that could be contributing to  symptoms is not appreciated on the MRI.  It is also possible the sudden onset of symptoms in the left arm is related to the cord signal change.    I have discussed cord signal change with Neurology.  It is plausible that this is a true demyelinating lesion with differential diagnosis including postviral versus posttraumatic versus multiple sclerosis.  Neurology recommends MRI brain, cervical, thoracic spine all with and without contrast to assess for any other demyelinating lesions.  I am referring him to Neurology for follow-up of these studies and the nerve conduction test.    Follow up with me as needed.    Visit Diagnosis:  Demyelinating lesion  -     EMG W/ ULTRASOUND AND NERVE CONDUCTION TEST 2 Extremities; Future  -     MRI Brain W WO Contrast; Future; Expected date: 02/04/2020  -     MRI Cervical Spine W WO Cont; Future; Expected date: 02/04/2020  -     MRI Thoracic Spine W WO Cont; Future; Expected date: 02/04/2020  -     Ambulatory referral/consult to Neurology; Future; Expected date: 02/11/2020    Left arm numbness  -     EMG W/ ULTRASOUND AND NERVE CONDUCTION TEST 2 Extremities; Future  -     MRI Brain W WO Contrast; Future; Expected date: 02/04/2020  -     MRI Cervical Spine W WO Cont; Future; Expected date: 02/04/2020  -     MRI Thoracic Spine W WO Cont; Future; Expected date: 02/04/2020  -     Ambulatory referral/consult to Neurology; Future; Expected date: 02/11/2020        Total time spent counseling greater than fifty percent of total visit time.  Counseling included discussion regarding imaging findings, diagnosis possibilities, treatment options, risks and benefits.   The patient had many questions regarding the options and long-term effects.

## 2020-02-07 NOTE — TELEPHONE ENCOUNTER
Tried to contact the patient to schedule an appointment. Unable to leave a message due to the mailbox being full. Will mail appointment to provided address in the chart.

## 2020-02-14 ENCOUNTER — TELEPHONE (OUTPATIENT)
Dept: PHYSICAL MEDICINE AND REHAB | Facility: CLINIC | Age: 25
End: 2020-02-14

## 2020-02-14 ENCOUNTER — OFFICE VISIT (OUTPATIENT)
Dept: PHYSICAL MEDICINE AND REHAB | Facility: CLINIC | Age: 25
End: 2020-02-14
Payer: COMMERCIAL

## 2020-02-14 DIAGNOSIS — R20.0 LEFT ARM NUMBNESS: ICD-10-CM

## 2020-02-14 DIAGNOSIS — G56.03 BILATERAL CARPAL TUNNEL SYNDROME: Primary | ICD-10-CM

## 2020-02-14 DIAGNOSIS — G37.9 DEMYELINATING LESION: ICD-10-CM

## 2020-02-14 PROCEDURE — 95913 NRV CNDJ TEST 13/> STUDIES: CPT | Mod: S$GLB,,, | Performed by: PHYSICAL MEDICINE & REHABILITATION

## 2020-02-14 PROCEDURE — 99499 UNLISTED E&M SERVICE: CPT | Mod: S$GLB,,, | Performed by: PHYSICAL MEDICINE & REHABILITATION

## 2020-02-14 PROCEDURE — 99499 NO LOS: ICD-10-PCS | Mod: S$GLB,,, | Performed by: PHYSICAL MEDICINE & REHABILITATION

## 2020-02-14 PROCEDURE — 95886 MUSC TEST DONE W/N TEST COMP: CPT | Mod: S$GLB,,, | Performed by: PHYSICAL MEDICINE & REHABILITATION

## 2020-02-14 PROCEDURE — 95886 PR EMG COMPLETE, W/ NERVE CONDUCTION STUDIES, 5+ MUSCLES: ICD-10-PCS | Mod: S$GLB,,, | Performed by: PHYSICAL MEDICINE & REHABILITATION

## 2020-02-14 PROCEDURE — 95913 PR NERVE CONDUCTION STUDY; 13 OR MORE STUDIES: ICD-10-PCS | Mod: S$GLB,,, | Performed by: PHYSICAL MEDICINE & REHABILITATION

## 2020-02-14 NOTE — TELEPHONE ENCOUNTER
----- Message from Salma Davis sent at 2/14/2020 11:20 AM CST -----  Contact: wife-Page  Pt wife Page states that they missed a call she believes from a Taylor.....[phone]

## 2020-02-14 NOTE — LETTER
February 14, 2020      Smitha Clay PA-C  [address] [facility] [address]  [address]  [place] [address]           [place]  [address] [place] [address] [address]  [facility] [address]  Phone: [phone]  Fax: [phone]          Patient: Christiano Aguayo   MR Number: [id number]   YOB: 1995   Date of Visit: 2/14/2020       Dear Smitha Clay:    Thank you for referring Christiano Aguayo to me for evaluation. Attached you will find relevant portions of my assessment and plan of care.    If you have questions, please do not hesitate to call me. I look forward to following Christiano Aguayo along with you.    Sincerely,    Dalton Huff MD    Enclosure  CC:  No Recipients    If you would like to receive this communication electronically, please contact [email or web address] or [phone] to request more information on [employer] Link access.    For providers and/or their staff who would like to refer a patient to [facility], please contact us through our one-stop-shop provider referral line, [facility] , at [phone].    If you feel you have received this communication in error or would no longer like to receive these types of communications, please e-mail [email or web address]

## 2020-02-14 NOTE — PROCEDURES
Procedures        [facility]  Physical Medicine and Rehabilitation   [address] [place] [address], [address]  [place] [address]             Full Name: JOSE GUADALUPE OLSON Gender: Female  Patient ID: [id number] YOB: 1995      Visit Date: 2/14/2020 16:18  Age: 24 Years 10 Months Old  Examining Physician: DANNY JONES DO      Sensory NCS      Nerve / Sites Rec. Site Onset Lat Peak Lat NP Amp Segments Distance Velocity     ms ms µV  cm m/s   L Median - Digit II (Antidromic)      Wrist Dig II 2.66 3.54 33.1 Wrist - Dig II 14 53   R Median - Digit II (Antidromic)      Wrist Dig II 1.93 2.81 14.4 Wrist - Dig II 14 73   L Ulnar - Digit V (Antidromic)      Wrist Dig V 2.19 3.02 6.1 Wrist - Dig V 14 64   L Radial - Anatomical snuff box (Forearm)      Forearm Wrist 1.72 2.40 13.0 Forearm - Wrist 10 58       Combined Sensory Index      Nerve / Sites Rec. Site Peak Lat NP Amp PP Amp Segments Peak Diff     ms µV µV  ms   L Median - CSI      Median Thumb 3.02 9.6 23.2 Median - Radial 0.57      Radial Thumb 2.45 6.6 43.7 Median - Ulnar 0.36      Median Ring 3.65 12.7 18.9 Median palm - Ulnar palm 0.26      Ulnar Ring 3.28 16.8 8.4        Median palm Wrist 2.19 24.3 28.2        Ulnar palm Wrist 1.93 2.3 12.5        CSI     CSI 1.20   R Median - CSI      Median Thumb 2.34 6.4 8.9 Median - Radial 0.05      Radial Thumb 2.29 5.8 9.6 Median - Ulnar 0.21      Median Ring 3.59 13.6 20.6 Median palm - Ulnar palm 0.47      Ulnar Ring 3.39 10.5 11.9        Median palm Wrist 2.19 38.0 45.5        Ulnar palm Wrist 1.72 27.2 12.8        CSI     CSI 0.73       Motor NCS      Nerve / Sites Muscle Latency Amplitude Amp % Duration Segments Distance Lat Diff Velocity     ms mV % ms  cm ms m/s   L Median - APB      Wrist APB 3.49 9.5 100 6.51 Wrist - APB 8        Elbow APB 7.24 7.0 73.7 6.72 Elbow - Wrist 19 3.75 51   R Median - APB      Wrist APB 3.49 9.4 100 6.46 Wrist - APB 8        Elbow APB 7.29 7.1 74.8 6.20 Elbow - Wrist 20 3.80 53    L Ulnar - ADM      Wrist ADM 2.60 9.5 100 5.00 Wrist - ADM 8        B.Elbow ADM 6.46 8.9 93.1 5.42 B.Elbow - Wrist 21 3.85 54      A.Elbow ADM 7.92 9.1 95.2 5.47 A.Elbow - B.Elbow 8 1.46 55   R Ulnar - ADM      Wrist ADM 2.50 12.7 100 5.16 Wrist - ADM 8        B.Elbow ADM 6.04 11.9 93.5 5.42 B.Elbow - Wrist 19.5 3.54 55      A.Elbow ADM 7.55 11.8 93 5.42 A.Elbow - B.Elbow 8 1.51 53       Motor NCS      Nerve / Sites Muscle Latency Amplitude Amp % Duration Segments Distance Lat Diff Velocity     ms mV % ms  cm ms m/s   L Radial - EIP      Forearm EIP 1.93 8.3 100 6.15 Forearm - EIP 8        Elbow EIP 4.11 4.7 56.3 7.14 Elbow - Forearm 13 2.19 59      Spiral Gr EIP 6.20 5.3 64.7 6.82 Spiral Gr - Elbow 12 2.08 58       EMG Summary Table     Spontaneous MUAP Recruitment   Muscle IA Fib PSW Fasc Other Amp Dur. PPP Pattern   L. Deltoid N None None None . N N N N   L. Biceps brachii N None None None . N N N N   L. Triceps brachii N None None None . N N N N   L. Pronator teres N None None None . N N N N   L. First dorsal interosseous N None None None . N N N N       Summary    The motor conduction test was performed on 5 nerve(s). The results were normal in 4 nerve(s): L Median - APB, R Median - APB, L Ulnar - ADM, R Ulnar - ADM. Findings were unremarkable in 1 nerve(s): L Radial - EIP. There were no results outside the specified normal range.      The sensory conduction test was performed on 6 nerve(s). The results were normal in 1 nerve(s): L Median - Digit II (Antidromic). Findings were unremarkable in 2 nerve(s): L Median - CSI, R Median - CSI. Results outside the specified normal range were found in 3 nerve(s), as follows:   In the R Median - Digit II (Antidromic) study  o the peak amplitude result was reduced for Wrist stimulation   In the L Ulnar - Digit V (Antidromic) study  o the peak amplitude result was reduced for Wrist stimulation   In the L Radial - Anatomical snuff box (Forearm) study  o the peak  amplitude result was reduced for Forearm stimulation    The needle EMG study was normal in all 7 tested muscles: L. Deltoid, L. Biceps brachii, L. Triceps brachii, L. Pronator teres, L. Abductor pollicis brevis, L. First dorsal interosseous, L. Cervical paraspinals.          Impression:  Abnormal examination.  There is electrodiagnostic evidence of:  1. Borderline mild left greater than right median mononeuropathy at the wrist (carpal tunnel syndromes.)  a. The diagnosis on the right was made based upon median to ulnar palm measurement greater than 0.3 milliseconds  2. There is no evidence of cervical radiculopathy in the muscles tested of the left upper extremity.  3. There is no evidence of a radial neuropathy in the left upper extremity.        Clinical history:    The chief complaint of left lateral elbow pain with radiation down the dorsal forearm into the dorsal aspect of the thumb and index finger is not consistent with the above findings.  He does have MRI findings of abnormal signal in the posterolateral cord at C5 extending 2 cm with an AP diameter of 3 x 4 mm.  He has decreased sensation in the left C6 dermatomal/radial nerve distribution on physical exam with a positive Hammond's on the left.  If this is the source of his symptoms, electrodiagnostic studies only test the lower motor neurons and cannot directly detect upper motor neuron pathology.    He does complain of intermittent bilateral hand numbness.  This is present at night and with prolonged usage of the hands.  This is likely secondary to carpal tunnel syndrome.  He was briefly consult on the diagnosis and treatments for carpal tunnel syndrome.  He should obtain a resting neutral wrist splint to wear only at night.        ____________________________  Dalton Huff D.O.  Board-Certified by the American Board of Physical Medicine and Rehabilitation  Board-Certified by the American Board of Electrodiagnostic Medicine

## 2020-02-17 ENCOUNTER — PATIENT MESSAGE (OUTPATIENT)
Dept: SPINE | Facility: CLINIC | Age: 25
End: 2020-02-17

## 2020-02-18 ENCOUNTER — PATIENT MESSAGE (OUTPATIENT)
Dept: FAMILY MEDICINE | Facility: CLINIC | Age: 25
End: 2020-02-18

## 2020-02-18 ENCOUNTER — PATIENT MESSAGE (OUTPATIENT)
Dept: SPINE | Facility: CLINIC | Age: 25
End: 2020-02-18

## 2020-02-18 DIAGNOSIS — G37.9 DEMYELINATING LESION: ICD-10-CM

## 2020-02-18 DIAGNOSIS — R20.0 LEFT ARM NUMBNESS: Primary | ICD-10-CM

## 2020-02-18 DIAGNOSIS — M54.12 CERVICAL RADICULOPATHY: ICD-10-CM

## 2020-02-19 RX ORDER — DIAZEPAM 10 MG/1
10 TABLET ORAL ONCE
Qty: 1 TABLET | Refills: 0 | Status: SHIPPED | OUTPATIENT
Start: 2020-02-19 | End: 2020-02-28 | Stop reason: SDUPTHER

## 2020-02-19 NOTE — TELEPHONE ENCOUNTER
Please reschedule MRI brain, cervical and thoracic spine.    Valium called into pharmacy for him to take 30 minutes before MRI.

## 2020-02-20 ENCOUNTER — PATIENT MESSAGE (OUTPATIENT)
Dept: SPINE | Facility: CLINIC | Age: 25
End: 2020-02-20

## 2020-02-22 ENCOUNTER — HOSPITAL ENCOUNTER (OUTPATIENT)
Dept: RADIOLOGY | Facility: HOSPITAL | Age: 25
Discharge: HOME | End: 2020-02-22
Attending: PHYSICIAN ASSISTANT
Payer: COMMERCIAL

## 2020-02-22 PROCEDURE — 72157 MRI THORACIC SPINE W WO CONTRAST: ICD-10-PCS | Mod: 26,,, | Performed by: RADIOLOGY

## 2020-02-22 PROCEDURE — 72156 MRI NECK SPINE W/O & W/DYE: CPT | Mod: 26,,, | Performed by: RADIOLOGY

## 2020-02-22 PROCEDURE — 72157 MRI CHEST SPINE W/O & W/DYE: CPT | Mod: 26,,, | Performed by: RADIOLOGY

## 2020-02-22 PROCEDURE — 70553 MRI BRAIN STEM W/O & W/DYE: CPT | Mod: TC

## 2020-02-22 PROCEDURE — 72156 MRI CERVICAL SPINE W WO CONTRAST: ICD-10-PCS | Mod: 26,,, | Performed by: RADIOLOGY

## 2020-02-22 PROCEDURE — 72157 MRI CHEST SPINE W/O & W/DYE: CPT | Mod: TC

## 2020-02-22 PROCEDURE — A9585 GADOBUTROL INJECTION: HCPCS | Performed by: PHYSICIAN ASSISTANT

## 2020-02-22 PROCEDURE — 72156 MRI NECK SPINE W/O & W/DYE: CPT | Mod: TC

## 2020-02-22 PROCEDURE — 70553 MRI BRAIN STEM W/O & W/DYE: CPT | Mod: 26,,, | Performed by: RADIOLOGY

## 2020-02-22 PROCEDURE — 70553 MRI BRAIN W WO CONTRAST: ICD-10-PCS | Mod: 26,,, | Performed by: RADIOLOGY

## 2020-02-22 PROCEDURE — 25500020 PHARM REV CODE 255: Performed by: PHYSICIAN ASSISTANT

## 2020-02-22 RX ORDER — GADOBUTROL 604.72 MG/ML
10 INJECTION INTRAVENOUS
Status: COMPLETED | OUTPATIENT
Start: 2020-02-22 | End: 2020-02-22

## 2020-02-22 RX ADMIN — GADOBUTROL 10 ML: 604.72 INJECTION INTRAVENOUS at 12:02

## 2020-02-24 ENCOUNTER — TELEPHONE (OUTPATIENT)
Dept: NEUROSURGERY | Facility: CLINIC | Age: 25
End: 2020-02-24

## 2020-02-24 ENCOUNTER — PATIENT MESSAGE (OUTPATIENT)
Dept: SPINE | Facility: CLINIC | Age: 25
End: 2020-02-24

## 2020-02-24 ENCOUNTER — TELEPHONE (OUTPATIENT)
Dept: SPINE | Facility: CLINIC | Age: 25
End: 2020-02-24

## 2020-02-24 NOTE — TELEPHONE ENCOUNTER
Spoke with patient's wife, Maude, and let her know I need to speak to Christiano about scheduling an appointment for an MRI, she indicated understanding.

## 2020-02-24 NOTE — TELEPHONE ENCOUNTER
Called patient to schedule him an appointment for an MRI, got voicemail, left return call message.

## 2020-02-24 NOTE — TELEPHONE ENCOUNTER
"Pt's mother is returning Erika Scout's call regarding the results of pt's imaging. 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 Pt's mother is asking for a return call with thorough explanation of results, as pt's wife is "having a meltdown" after her conversation with Erika due to not understanding results.  "

## 2020-02-24 NOTE — TELEPHONE ENCOUNTER
----- Message from Sergio Licona sent at 2/24/2020  3:14 PM CST -----  Contact: Maude Aguayo wife of pt  Wife of pt called back to speak with Abdulkadir no answer. Please call pt back as soon as possible. Thank you    [phone]

## 2020-02-26 ENCOUNTER — TELEPHONE (OUTPATIENT)
Dept: NEUROSURGERY | Facility: CLINIC | Age: 25
End: 2020-02-26

## 2020-02-27 ENCOUNTER — TELEPHONE (OUTPATIENT)
Dept: SPINE | Facility: CLINIC | Age: 25
End: 2020-02-27

## 2020-02-27 DIAGNOSIS — G37.9 DEMYELINATING LESION: ICD-10-CM

## 2020-02-27 DIAGNOSIS — M54.12 CERVICAL RADICULOPATHY: ICD-10-CM

## 2020-02-27 DIAGNOSIS — R20.0 LEFT ARM NUMBNESS: ICD-10-CM

## 2020-02-28 RX ORDER — DIAZEPAM 10 MG/1
10 TABLET ORAL ONCE
Qty: 1 TABLET | Refills: 0 | Status: SHIPPED | OUTPATIENT
Start: 2020-02-28 | End: 2020-04-08

## 2020-03-03 ENCOUNTER — PATIENT MESSAGE (OUTPATIENT)
Dept: SPINE | Facility: CLINIC | Age: 25
End: 2020-03-03

## 2020-03-04 ENCOUNTER — PATIENT MESSAGE (OUTPATIENT)
Dept: SPINE | Facility: CLINIC | Age: 25
End: 2020-03-04

## 2020-03-07 ENCOUNTER — HOSPITAL ENCOUNTER (OUTPATIENT)
Dept: RADIOLOGY | Facility: HOSPITAL | Age: 25
Discharge: HOME | End: 2020-03-07
Attending: PHYSICIAN ASSISTANT
Payer: COMMERCIAL

## 2020-03-07 DIAGNOSIS — R90.89 ABNORMAL FINDING ON MRI OF BRAIN: ICD-10-CM

## 2020-03-07 PROCEDURE — 70553 MRI BRAIN STEM W/O & W/DYE: CPT | Mod: TC

## 2020-03-07 PROCEDURE — 70553 MRI PITUITARY W W/O CONTRAST: ICD-10-PCS | Mod: 26,,, | Performed by: RADIOLOGY

## 2020-03-07 PROCEDURE — 25500020 PHARM REV CODE 255: Performed by: PHYSICIAN ASSISTANT

## 2020-03-07 PROCEDURE — 70553 MRI BRAIN STEM W/O & W/DYE: CPT | Mod: 26,,, | Performed by: RADIOLOGY

## 2020-03-07 PROCEDURE — A9585 GADOBUTROL INJECTION: HCPCS | Performed by: PHYSICIAN ASSISTANT

## 2020-03-07 RX ORDER — GADOBUTROL 604.72 MG/ML
5 INJECTION INTRAVENOUS
Status: COMPLETED | OUTPATIENT
Start: 2020-03-07 | End: 2020-03-07

## 2020-03-07 RX ADMIN — GADOBUTROL 5 ML: 604.72 INJECTION INTRAVENOUS at 08:03

## 2020-03-13 ENCOUNTER — TELEPHONE (OUTPATIENT)
Dept: NEUROSURGERY | Facility: CLINIC | Age: 25
End: 2020-03-13

## 2020-03-13 NOTE — TELEPHONE ENCOUNTER
----- Message from Erika Butterfield PA-C sent at 3/13/2020  1:36 PM CDT -----  I ordered MRI pituitary while ebony was out covering her patient. Just looking at the follow up MRI.   Called patient. No answer. Left VM. 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 We will order hormone labs and he should follow up with his PCP or endocrinology if there are any abnormalities.     Please schedule labs.    Thank you,  Erika

## 2020-03-15 ENCOUNTER — PATIENT MESSAGE (OUTPATIENT)
Dept: NEUROLOGY | Facility: CLINIC | Age: 25
End: 2020-03-15

## 2020-03-16 ENCOUNTER — TELEPHONE (OUTPATIENT)
Dept: NEUROLOGY | Facility: CLINIC | Age: 25
End: 2020-03-16

## 2020-03-16 ENCOUNTER — PATIENT MESSAGE (OUTPATIENT)
Dept: NEUROLOGY | Facility: CLINIC | Age: 25
End: 2020-03-16

## 2020-03-16 NOTE — TELEPHONE ENCOUNTER
----- Message from Adiel Sheffield sent at 3/16/2020  9:00 AM CDT -----  Contact: mom @ [phone]  Asking to change appt tomorrow to video visit, due to pt's father high risk for coronavirus

## 2020-03-16 NOTE — TELEPHONE ENCOUNTER
----- Message from Beatriz Clancy sent at 3/16/2020 12:45 PM CDT -----  Pt is calling to see if he can do the video chat and would like for the nurse to give him a call back [phone]

## 2020-03-17 ENCOUNTER — PATIENT MESSAGE (OUTPATIENT)
Dept: NEUROLOGY | Facility: CLINIC | Age: 25
End: 2020-03-17

## 2020-03-17 ENCOUNTER — OFFICE VISIT (OUTPATIENT)
Dept: NEUROLOGY | Facility: CLINIC | Age: 25
End: 2020-03-17
Payer: COMMERCIAL

## 2020-03-17 DIAGNOSIS — R93.7 ABNORMAL MAGNETIC RESONANCE IMAGING OF CERVICAL SPINE: Primary | ICD-10-CM

## 2020-03-17 DIAGNOSIS — G56.02 CARPAL TUNNEL SYNDROME OF LEFT WRIST: ICD-10-CM

## 2020-03-17 PROCEDURE — 99203 PR OFFICE/OUTPT VISIT, NEW, LEVL III, 30-44 MIN: ICD-10-PCS | Mod: 95,,, | Performed by: STUDENT IN AN ORGANIZED HEALTH CARE EDUCATION/TRAINING PROGRAM

## 2020-03-17 PROCEDURE — 99203 OFFICE O/P NEW LOW 30 MIN: CPT | Mod: 95,,, | Performed by: STUDENT IN AN ORGANIZED HEALTH CARE EDUCATION/TRAINING PROGRAM

## 2020-03-17 NOTE — PROGRESS NOTES
Consult Start Time: 03/18/2020 14:00  Consult End Time: 03/18/2020 15:00      TELE-NEUROLOGY VIDEO VISIT NOTE:    The patient location is: His home  The chief complaint leading to consultation is: LUE numbness/tingling with imaging findings  Visit type: Virtual visit with synchronous audio and video  Total time spent with patient: 50 minutes  Each patient to whom medical services by telemedicine is offered are:  (1) informed of the relationship between the physician and patient and the respective role of any other health care provider with respect to management of the patient; and (2) notified that he or she may decline to receive medical services by telemedicine and may withdraw from such care at any time.    Notes: Per below    Patient Name:  Christiano Aguayo  Patient MRN:  [id number]    HPI:  Patient is a 24 y.o. male with PMHx of obesity, snoring w/ concern for CORINA, intermittent numbness of LUE evaluated by NSGY with EMG notable for L > R CTS, and concern on imaging for possible small hyperintensities in C and T spine who is being evaluated today via teleneurology visit 3/17/2020 for possible demyelinating disease. His mother is on video conference with him at his home. Discussed patient's onset of symptoms.  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  Symptoms of numbness/tingling through the L forearm started with this injury. He has residual continued numbness/tingling in the L hand in the first and second fingers.  States that it wakes him up from sleep, he does sleep with clenched fists and bent wrist frequently.  Sometimes gets exacerbated at work where he has to climb ladders and lift boxes.  Wife had gotten him a splint but he just recently got used to wearing CPAP for CORINA and didn't want to do both right away.  Has not tried any OTC meds for numbness/tingling.  He denies previous episodes of numbness/tingling, no  previous episodes of weakness, no bowel/bladder issues, no dysphagia, no dysarthria, no diplopia, no vision changes.  Some fatigue, but states that it is improving with CPAP use.  No clear cognitive issues or changes.  Reviewed imaging with patient via virtual visit and noted small hyperintensity on MRI Brain, finding on C spine, and concern for reading of artifact on T spine rather than an actual lesion.  Discussed possible need for more work up.  Patient and his mother do note strong family hx of autoimmune disease--undiagnosed in patient's father though they have an appt with rheumatology at [facility], two sisters with Raynaud's disease.  No family hx of MS.  No recent viral infections or vaccines.  Has some L shoulder soreness.  [facility], was referred to NSGY clinic where he had brain, C, T spine imaging and with findings, referred to Neurology for possibility of demyelinating disease.    ROS:  General:  No fever, no chills, + fatigue, + change in weight (steady gain)  HEENT:  No headache, no changes in vision  Respiratory:  No cough, no SOB  Cardiovascular:  No chest pain, no palpitations  GI:  No abdominal pain, no n/v/c/d  :  No urinary incontinence  Skin:  No rashes, no pruritus, no wounds  Musculoskeletal:  No myalgias, + arthralgias (L shoulder)  Hematologic:  No easy bruising or bleeding  Neuro:  No tremors, no focal weakness, + paresthesias  Psych:  No anxiety, no depression    PMHx:  Patient Active Problem List   Diagnosis    Class 3 severe obesity due to excess calories without serious comorbidity with body mass index (BMI) of 45.0 to 49.9 in adult    Snoring    Abnormal weight gain    Leucocytosis     PSHx:  Past Surgical History:   Procedure Laterality Date    ADENOIDECTOMY      APPENDECTOMY       Medications:  Current Outpatient Medications on File Prior to Visit   Medication Sig Dispense Refill    diazePAM (VALIUM) 10 MG Tab Take 1 tablet  (10 mg total) by mouth once. 30 minutes prior to MRI.  Must have someone else drive you to and from the facility. for 1 dose 1 tablet 0     No current facility-administered medications on file prior to visit.      Allergies:  Review of patient's allergies indicates:  No Known Allergies     Family Hx:  Father with undiagnosed condition, presumed autoimmune and plan to see [facility]  Two sisters with Raynaud's disease  No family hx of MS reported    Social Hx:  Patient works in a warehouse.  .  No tobacco use, rare/social EtOH use, no illicit use.    Physical Exam (limits due to teleneurology visit):  There were no vitals taken for this visit.  General:  Well-developed, obese, nad  HEENT:  NCAT, EOMI, oropharygneal membranes non-erythematous/without exudate  Neck:  Supple, normal ROM   Respiratory:  Symmetric expansion, no increased wob  GI:  Abd soft, non-distended  Skin:  No visible rashes or wounds  Psych:  Pleasant, cooperative with exam.  Speech and thought content appropriate.  Neurologic Exam:  Mental Status:  AAOx3.  Converses easily.   Cranial Nerves:  EOMI.  Facial movement intact, symmetric. Tongue protrudes midline, palate raises symmetrically.    Motor:  Normal muscle bulk. Moves all extremities against gravity spontaneously.  Sensory:  Subjective numbness/tingling in 1st and 2nd digits w/ some radiation through dorsum of palm up lateral forearm.  Coordination:  No resting tremor or myoclonus.  FTN, DIANN wnl--no ataxia, dysmetria, or dysdiadochokinesia.    Labs:  Results: CBC:   Lab Results   Component Value Date/Time    WBC 11.16 10/08/2019 09:15 AM    RBC 4.99 10/08/2019 09:15 AM    HGB 14.7 10/08/2019 09:15 AM    HCT 46.4 10/08/2019 09:15 AM     10/08/2019 09:15 AM    MCV 93 10/08/2019 09:15 AM    MCH 29.5 10/08/2019 09:15 AM    MCHC 31.7 (L) 10/08/2019 09:15 AM     CMP:   Lab Results   Component Value Date/Time     10/08/2019 09:15 AM    CALCIUM 9.8 10/08/2019 09:15  AM    ALBUMIN 4.3 10/08/2019 09:15 AM    PROT 8.1 10/08/2019 09:15 AM     10/08/2019 09:15 AM    K 4.5 10/08/2019 09:15 AM    CO2 28 10/08/2019 09:15 AM     10/08/2019 09:15 AM    BUN 17 10/08/2019 09:15 AM    CREATININE 1.1 10/08/2019 09:15 AM    ALKPHOS 43 (L) 10/08/2019 09:15 AM    ALT 76 (H) 10/08/2019 09:15 AM    AST 43 (H) 10/08/2019 09:15 AM    BILITOT 0.5 10/08/2019 09:15 AM     Imagin20 MRI Brain w/ w/o contrast demyelinating:  Few scattered punctate foci of T2 FLAIR signal hyperintensity supratentorial white matter and central dorsal star while nonspecific may be mild degree of prior demyelinating plaque in light of history.  There is no diffusion signal abnormality or enhancement with these foci to suggest active demyelination.  No evidence for acute infarction.    There is a nodular region of T1 signal hyperintensity along the posterior aspect of the superior infundibular stock.  While nonspecific primary differential to include proteinaceous Rathke's cleft cyst versus ectopic neurohypophysis.  Clinical correlation and further evaluation with dedicated pituitary MRI with and without contrast advised.    20 MRI C, T w/ w/o contrast demyelinating:  Continued short segment T2 stir signal hyperintensity within the cervical cord extending from inferior C4 through mid C5.  In addition there is a separate focus of cord signal abnormality in the distal thoracic cord at the T12 vertebral body level in the left aspect of the cord.  No cord expansion or abnormal intrathecal enhancement.  In light of history this may be sequela of prior demyelination.    Mild scattered degenerative changes.    Clinical correlation and follow-up advised.    Additional Diagnotic Testing:  N/a    ASSESSMENT/PLAN:  Patient is a 24 y.o. male with a PMHx of obesity, snoring w/ concern for CORINA, intermittent numbness of LUE evaluated by NSGY with EMG notable for L > R CTS, and concern on imaging for possible  small hyperintensities in C and T spine who presents via teleneurology visit  3/17/2020 to discuss possible demyelinating etiology.    Problem List Items Addressed This Visit        1 - High    Abnormal magnetic resonance imaging of cervical spine - Primary    Current Assessment & Plan     -Will plan for Fl lumbar puncture--LP labs ordered  -Serum labs pending--LLIY Screen, SPEP/JESSICA, B burgdorferi, vitamin B12, TSH/FT4, serum MS Profile  -No further imaging at this time.  If work up unremarkable, no treatment indicated.         Relevant Orders    B. burgdorferi Abs (Lyme Disease)    Immunofixation electrophoresis    Protein electrophoresis, serum    TSH    T4, free    Vitamin B12    LILY Screen w/Reflex    FL Lumbar Puncture (xpd)    CSF cell count with differential    CSF cell count with differential    Glucose, CSF    Protein, CSF    Ms Profile    Ms Profile Blood Collection    Freeze and Hold,        2     Carpal tunnel syndrome of left wrist    Overview     -Advised nightly use of wrist splint, OTC NSAIDs prn (enteric coated)  -Will refer to [facility] as needed if continuing symptoms with use of wrist splint                 Dana Anna MD  Pager:  [phone] [place] [address]  [phone]

## 2020-03-18 ENCOUNTER — PATIENT MESSAGE (OUTPATIENT)
Dept: SPINE | Facility: CLINIC | Age: 25
End: 2020-03-18

## 2020-03-18 PROBLEM — R93.7 ABNORMAL MAGNETIC RESONANCE IMAGING OF CERVICAL SPINE: Status: ACTIVE | Noted: 2020-03-18

## 2020-03-18 PROBLEM — G56.02 CARPAL TUNNEL SYNDROME OF LEFT WRIST: Status: ACTIVE | Noted: 2020-03-18

## 2020-03-18 NOTE — ASSESSMENT & PLAN NOTE
-Will plan for Fl lumbar puncture--LP labs ordered  -Serum labs pending--LILY Screen, SPEP/JESSICA, B burgdorferi, vitamin B12, TSH/FT4, serum MS Profile  -No further imaging at this time.  If work up unremarkable, no treatment indicated.

## 2020-03-24 NOTE — PROGRESS NOTES
I have reviewed the history and physical, assessments, and plan, I [facility]    Melissa Andre MD  General Neurology Staff  [facility]

## 2020-04-06 ENCOUNTER — PATIENT MESSAGE (OUTPATIENT)
Dept: NEUROLOGY | Facility: CLINIC | Age: 25
End: 2020-04-06

## 2020-04-06 ENCOUNTER — PATIENT MESSAGE (OUTPATIENT)
Dept: FAMILY MEDICINE | Facility: CLINIC | Age: 25
End: 2020-04-06

## 2020-04-08 ENCOUNTER — OFFICE VISIT (OUTPATIENT)
Dept: FAMILY MEDICINE | Facility: CLINIC | Age: 25
End: 2020-04-08
Payer: COMMERCIAL

## 2020-04-08 DIAGNOSIS — G47.33 OBSTRUCTIVE SLEEP APNEA SYNDROME: Primary | ICD-10-CM

## 2020-04-08 DIAGNOSIS — J30.1 SEASONAL ALLERGIC RHINITIS DUE TO POLLEN: ICD-10-CM

## 2020-04-08 DIAGNOSIS — R74.8 INCREASED LIVER ENZYMES: ICD-10-CM

## 2020-04-08 DIAGNOSIS — G56.02 CARPAL TUNNEL SYNDROME OF LEFT WRIST: ICD-10-CM

## 2020-04-08 DIAGNOSIS — E78.49 OTHER HYPERLIPIDEMIA: ICD-10-CM

## 2020-04-08 DIAGNOSIS — R93.7 ABNORMAL MAGNETIC RESONANCE IMAGING OF CERVICAL SPINE: ICD-10-CM

## 2020-04-08 PROCEDURE — 99214 OFFICE O/P EST MOD 30 MIN: CPT | Mod: 95,,, | Performed by: FAMILY MEDICINE

## 2020-04-08 PROCEDURE — 99214 PR OFFICE/OUTPT VISIT, EST, LEVL IV, 30-39 MIN: ICD-10-PCS | Mod: 95,,, | Performed by: FAMILY MEDICINE

## 2020-04-08 RX ORDER — FLUTICASONE PROPIONATE 50 MCG
2 SPRAY, SUSPENSION (ML) NASAL DAILY
Qty: 15.8 ML | Refills: 0 | Status: SHIPPED | OUTPATIENT
Start: 2020-04-08 | End: 2022-09-13

## 2020-04-08 NOTE — PROGRESS NOTES
Subjective:       Patient ID: Christiano Aguayo is a 25 y.o. male.    Chief Complaint: Allergies    HPI     The patient location is:  [place]  The chief complaint leading to consultation is:  Allergies, follow up  Visit type: Virtual visit with synchronous audio and video  Total time spent with patient:  20 min  Each patient to whom he or she provides medical services by telemedicine is:  (1) informed of the relationship between the physician and patient and the respective role of any other health care provider with respect to management of the patient; and (2) notified that he or she may decline to receive medical services by telemedicine and may withdraw from such care at any time.    Notes:     Sleep apnea:  The patient is sleeping with the sleep apnea machine as directed.  Denies any problems with this.    Allergies:  The patient complains of nasal congestion, postnasal drip, nasal discharge, the patient stated that being outside make him symptoms worse, currently he is not taking any medications for allergies.  He denies any symptoms of sore throat, fever, chills, myalgias, not feeling well.  The patient stated that he is still working, he is not wearing mask at work, several people that work with him are developing symptoms of COVID-19.    Carpal tunnel syndrome:  The patient was diagnosed with carpal tunnel syndrome, was recommended to wear a splint and also take ibuprofen, the symptoms are the same.  He will contact us if he needs to be referral to see the hand specialist.    Hyperlipidemia:  The last cholesterol levels were elevated.  The patient cholesterol were check approximately 6 months ago.  The patient has been trying to eat healthier.    Abnormal liver enzymes:  At his last office visit, the liver enzymes were slightly elevated.  The patient was not recheck since then.  He would like to return to the office to get this recheck but after COVID-19 situation is improved.    Abnormal MRI of the neck:  The  patient had multiple scans, also was ordered some blood work, the patient stated that was told in the beginning that could have MS but then this was rule out, he will return to the office to complete the blood work.    Past medical history, past social history was reviewed and discussed with the patient.    Review of Systems   Constitutional: Negative for activity change and appetite change.   HENT: Positive for congestion, postnasal drip, rhinorrhea and sinus pressure. Negative for ear discharge.    Eyes: Negative for discharge and itching.   Respiratory: Negative for choking and chest tightness.    Cardiovascular: Negative for chest pain and leg swelling.   Gastrointestinal: Negative for abdominal distention and abdominal pain.   Endocrine: Negative for cold intolerance and heat intolerance.   Genitourinary: Negative for dysuria and flank pain.   Musculoskeletal: Negative for arthralgias and back pain.   Skin: Negative for pallor and rash.   Allergic/Immunologic: Negative for environmental allergies and food allergies.   Neurological: Negative for dizziness, facial asymmetry and headaches.   Hematological: Negative for adenopathy. Does not bruise/bleed easily.   Psychiatric/Behavioral: Negative for agitation and confusion.       Objective:      Physical Exam   Constitutional: He is oriented to person, place, and time. He appears well-developed and well-nourished. No distress.   HENT:   Head: Normocephalic and atraumatic.   Right Ear: External ear normal.   Left Ear: External ear normal.   Neurological: He is alert and oriented to person, place, and time.   Skin: He is not diaphoretic.   Psychiatric: He has a normal mood and affect. His behavior is normal. Judgment and thought content normal.       Assessment:       1. Obstructive sleep apnea syndrome    2. Seasonal allergic rhinitis due to pollen    3. Other hyperlipidemia    4. Increased liver enzymes        Plan:       Obstructive sleep apnea syndrome:   Stable    Seasonal allergic rhinitis due to pollen:  Worsening  -     fluticasone propionate (FLONASE) 50 mcg/actuation nasal spray; 2 sprays (100 mcg total) by Each Nostril route once daily.  Dispense: 15.8 mL; Refill: 0    Other hyperlipidemia:  Uncontrolled  -     Comprehensive metabolic panel; Future; Expected date: 04/08/2020  -     Lipid panel; Future; Expected date: 04/08/2020    Increased liver enzymes:  New problem workup needed  -     Lipid panel; Future; Expected date: 04/08/2020    Carpal tunnel syndrome:  Stable    Abnormal MRI of the cervical spine:  Stable    The patient will follow with neurologist as directed and will complete the blood work.  Will also order blood work to recheck the liver enzymes and the cholesterol levels.  Will start patient on fluticasone 50 mcg to spray in each nostril daily, also the patient was recommended to use Allegra over-the-counter daily.  Healthy habits, avoid fried foods, red meat and processed starches.  For carpal tunnel syndrome, he was recommended if the symptoms get worse to notify us immediately and will refer him to see the hand specialist.  Patient agreed with assessment and plan. Patient verbalized understanding.

## 2020-04-08 NOTE — PATIENT INSTRUCTIONS
Eating Heart-Healthy Food: Using the DASH Plan    Eating for your heart doesnt have to be hard or boring. You just need to know how to make healthier choices. The DASH eating plan has been developed to help you do just that. DASH stands for Dietary Approaches to Stop Hypertension. It is a plan that has been proven to be healthier for your heart and to lower your risk for high blood pressure. It can also help lower your risk for cancer, heart disease, osteoporosis, and diabetes.  Choosing from each food group  Choose foods from each of the food groups below each day. Try to get the recommended number of servings for each food group. The serving numbers are based on a diet of 2,000 calories a day. Talk to your doctor if youre unsure about your calorie needs. Along with getting the correct servings, the DASH plan also recommends a sodium intake less than 2,300 mg per day.        Grains  Servings: 6 to 8 a day  A serving is:  · 1 slice bread  · 1 ounce dry cereal  · Half a cup cooked rice, pasta or cereal  Best choices: Whole grains and any grains high in fiber. Vegetables  Servings: 4 to 5 a day  A serving is:  · 1 cup raw leafy vegetable  · Half a cup cut-up raw or cooked vegetable  · Half a cup vegetable juice  Best choices: Fresh or frozen vegetables prepared without added salt or fat.   Fruits  Servings: 4 to 5 a day  A serving is:  · 1 medium fruit  · One-quarter cup dried fruit  · Half a cup fresh, frozen, or canned fruit  · Half a cup of 100% fruit juices  Best choices: A variety of fresh fruits of different colors. Whole fruits are a better choice than fruit juices. Low-fat or fat-free dairy  Servings: 2 to 3 a day  A serving is:  · 1 cup milk  · 1 cup yogurt  · One and a half ounces cheese  Best choices: Skim or 1% milk, low-fat or fat-free yogurt or buttermilk, and low-fat cheeses.         Lean meats, poultry, fish  Servings: 6 or fewer a day  A serving is:  · 1 ounce cooked meats, poultry, or fish  · 1  egg  Best choices: Lean poultry and fish. Trim away visible fat. Broil, grill, roast, or boil instead of frying. Remove skin from poultry before eating. Limit how much red meat you eat.  Nuts, seeds, beans  Servings: 4 to 5 a week  A serving is:  · One-third cup nuts (one and a half ounces)  · 2 tablespoons nut butter or seeds  · Half a cup cooked dry beans or legumes  Best choices: Dry roasted nuts with no salt added, lentils, kidney beans, garbanzo beans, and whole ramírez beans.   Fats and oils  Servings: 2 to 3 a day  A serving is:  · 1 teaspoon vegetable oil  · 1 teaspoon soft margarine  · 1 tablespoon mayonnaise  · 2 tablespoons salad dressing  Best choices: Nut and vegetable oils (nontropical vegetable oils), such as olive and canola oil. Sweets  Servings: 5 a week or fewer  A serving is:  · 1 tablespoon sugar, maple syrup, or honey  · 1 tablespoon jam or jelly  · 1 half-ounce jelly beans (about 15)  · 1 cup lemonade  Best choices: Dried fruit can be a satisfying sweet. Choose low-fat sweets. And watch your serving sizes!      For more on the DASH eating plan, visit:  www.nhlbi.nih.gov/health/health-topics/topics/dash   Date Last Reviewed: 6/1/2016  © 0300-9270 [employer]. [address] [place] [address], [place] [address]. All rights reserved. This information is not intended as a substitute for professional medical care. Always follow your healthcare professional's instructions.

## 2020-08-03 ENCOUNTER — OFFICE VISIT (OUTPATIENT)
Dept: FAMILY MEDICINE | Facility: CLINIC | Age: 25
End: 2020-08-03
Payer: COMMERCIAL

## 2020-08-03 DIAGNOSIS — R20.0 NUMBNESS AND TINGLING IN LEFT HAND: Primary | ICD-10-CM

## 2020-08-03 DIAGNOSIS — R20.2 NUMBNESS AND TINGLING IN LEFT HAND: Primary | ICD-10-CM

## 2020-08-03 PROCEDURE — 99213 PR OFFICE/OUTPT VISIT, EST, LEVL III, 20-29 MIN: ICD-10-PCS | Mod: 95,,, | Performed by: FAMILY MEDICINE

## 2020-08-03 PROCEDURE — 99213 OFFICE O/P EST LOW 20 MIN: CPT | Mod: 95,,, | Performed by: FAMILY MEDICINE

## 2020-08-03 NOTE — PROGRESS NOTES
Subjective:       Patient ID: Christiano Aguayo is a 25 y.o. male.    Chief Complaint: Numbness    HPI     The patient location is:  [place]  The chief complaint leading to consultation is:  Numbness    Visit type:  Audiovisual    Face to Face time with patient:  10 min  Twelve minutes of total time spent on the encounter, which includes face to face time and non-face to face time preparing to see the patient (eg, review of tests), Obtaining and/or reviewing separately obtained history, Documenting clinical information in the electronic or other health record, Independently interpreting results (not separately reported) and communicating results to the patient/family/caregiver, or Care coordination (not separately reported).         Each patient to whom he or she provides medical services by telemedicine is:  (1) informed of the relationship between the physician and patient and the respective role of any other health care provider with respect to management of the patient; and (2) notified that he or she may decline to receive medical services by telemedicine and may withdraw from such care at any time.    Notes:  The patient is here today complaining of numbness and tingling sensation on the left hand especially on the left thumb and index, the patient had a EMG studies in February 2020 which showed presence of borderline carpal tunnel syndrome.  The patient went to see the orthopedic specialist recommend a spine referral to rule out abnormalities.  The patient went to see the neurologist and was rule out MS.  The patient stated that the numbness and tingling sensation on the left hand are getting worse, a nighttime he stated that the numbness is also from the elbow down to the hand.  The patient is here for assessment.    Past medical history, past social history was reviewed and discussed with the patient.    Review of Systems   Constitutional: Negative for activity change and unexpected weight change.   HENT:  Negative for hearing loss, rhinorrhea and trouble swallowing.    Eyes: Negative for discharge and visual disturbance.   Respiratory: Negative for chest tightness and wheezing.    Cardiovascular: Negative for chest pain and palpitations.   Gastrointestinal: Negative for blood in stool, constipation, diarrhea and vomiting.   Endocrine: Negative for polydipsia and polyuria.   Genitourinary: Negative for difficulty urinating, hematuria and urgency.   Musculoskeletal: Positive for arthralgias. Negative for joint swelling and neck pain.   Neurological: Positive for numbness and headaches. Negative for weakness.   Psychiatric/Behavioral: Negative for confusion and dysphoric mood.       Objective:      Physical Exam  Constitutional:       Appearance: Normal appearance.   Neurological:      Mental Status: He is alert.   Psychiatric:         Mood and Affect: Mood normal.         Behavior: Behavior normal.         Thought Content: Thought content normal.         Judgment: Judgment normal.         Assessment:       1. Numbness and tingling in left hand        Plan:       Numbness and tingling in left hand:  Worsening    Will consult with the hand orthopedic specialist for further assessment, symptoms likely secondary to carpal tunnel syndrome. Patient agreed with assessment and plan. Patient verbalized understanding.

## 2021-04-19 ENCOUNTER — LAB VISIT (OUTPATIENT)
Dept: LAB | Facility: HOSPITAL | Age: 26
End: 2021-04-19
Attending: FAMILY MEDICINE
Payer: COMMERCIAL

## 2021-04-19 ENCOUNTER — OFFICE VISIT (OUTPATIENT)
Dept: FAMILY MEDICINE | Facility: CLINIC | Age: 26
End: 2021-04-19
Payer: COMMERCIAL

## 2021-04-19 VITALS
HEART RATE: 78 BPM | RESPIRATION RATE: 18 BRPM | SYSTOLIC BLOOD PRESSURE: 128 MMHG | OXYGEN SATURATION: 97 % | WEIGHT: 315 LBS | DIASTOLIC BLOOD PRESSURE: 72 MMHG | BODY MASS INDEX: 45.1 KG/M2 | HEIGHT: 70 IN

## 2021-04-19 DIAGNOSIS — R20.2 NUMBNESS AND TINGLING: ICD-10-CM

## 2021-04-19 DIAGNOSIS — R90.89 ABNORMAL FINDING ON MRI OF BRAIN: ICD-10-CM

## 2021-04-19 DIAGNOSIS — R20.0 NUMBNESS AND TINGLING: ICD-10-CM

## 2021-04-19 DIAGNOSIS — E66.9 OBESITY WITHOUT SERIOUS COMORBIDITY, UNSPECIFIED CLASSIFICATION, UNSPECIFIED OBESITY TYPE: ICD-10-CM

## 2021-04-19 DIAGNOSIS — H53.453: ICD-10-CM

## 2021-04-19 DIAGNOSIS — Z13.6 SCREENING FOR CARDIOVASCULAR CONDITION: ICD-10-CM

## 2021-04-19 DIAGNOSIS — G56.02 CARPAL TUNNEL SYNDROME OF LEFT WRIST: Primary | ICD-10-CM

## 2021-04-19 LAB
ALBUMIN SERPL BCP-MCNC: 3.8 G/DL (ref 3.5–5.2)
ALP SERPL-CCNC: 45 U/L (ref 55–135)
ALT SERPL W/O P-5'-P-CCNC: 59 U/L (ref 10–44)
ANION GAP SERPL CALC-SCNC: 9 MMOL/L (ref 8–16)
AST SERPL-CCNC: 32 U/L (ref 10–40)
BASOPHILS # BLD AUTO: 0.05 K/UL (ref 0–0.2)
BASOPHILS NFR BLD: 0.4 % (ref 0–1.9)
BILIRUB SERPL-MCNC: 0.3 MG/DL (ref 0.1–1)
BUN SERPL-MCNC: 12 MG/DL (ref 6–20)
CALCIUM SERPL-MCNC: 9.2 MG/DL (ref 8.7–10.5)
CHLORIDE SERPL-SCNC: 103 MMOL/L (ref 95–110)
CHOLEST SERPL-MCNC: 191 MG/DL (ref 120–199)
CHOLEST/HDLC SERPL: 6.2 {RATIO} (ref 2–5)
CO2 SERPL-SCNC: 27 MMOL/L (ref 23–29)
CREAT SERPL-MCNC: 1 MG/DL (ref 0.5–1.4)
DIFFERENTIAL METHOD: ABNORMAL
EOSINOPHIL # BLD AUTO: 0.3 K/UL (ref 0–0.5)
EOSINOPHIL NFR BLD: 2.3 % (ref 0–8)
ERYTHROCYTE [DISTWIDTH] IN BLOOD BY AUTOMATED COUNT: 12.9 % (ref 11.5–14.5)
EST. GFR  (AFRICAN AMERICAN): >60 ML/MIN/1.73 M^2
EST. GFR  (NON AFRICAN AMERICAN): >60 ML/MIN/1.73 M^2
GLUCOSE SERPL-MCNC: 130 MG/DL (ref 70–110)
HCT VFR BLD AUTO: 44.1 % (ref 40–54)
HDLC SERPL-MCNC: 31 MG/DL (ref 40–75)
HDLC SERPL: 16.2 % (ref 20–50)
HGB BLD-MCNC: 14.3 G/DL (ref 14–18)
IMM GRANULOCYTES # BLD AUTO: 0.04 K/UL (ref 0–0.04)
IMM GRANULOCYTES NFR BLD AUTO: 0.3 % (ref 0–0.5)
LDLC SERPL CALC-MCNC: 93.8 MG/DL (ref 63–159)
LYMPHOCYTES # BLD AUTO: 3 K/UL (ref 1–4.8)
LYMPHOCYTES NFR BLD: 22.8 % (ref 18–48)
MCH RBC QN AUTO: 28.9 PG (ref 27–31)
MCHC RBC AUTO-ENTMCNC: 32.4 G/DL (ref 32–36)
MCV RBC AUTO: 89 FL (ref 82–98)
MONOCYTES # BLD AUTO: 0.7 K/UL (ref 0.3–1)
MONOCYTES NFR BLD: 5.5 % (ref 4–15)
NEUTROPHILS # BLD AUTO: 9.1 K/UL (ref 1.8–7.7)
NEUTROPHILS NFR BLD: 68.7 % (ref 38–73)
NONHDLC SERPL-MCNC: 160 MG/DL
NRBC BLD-RTO: 0 /100 WBC
PLATELET # BLD AUTO: 293 K/UL (ref 150–450)
PMV BLD AUTO: 11 FL (ref 9.2–12.9)
POTASSIUM SERPL-SCNC: 4.2 MMOL/L (ref 3.5–5.1)
PROT SERPL-MCNC: 7.8 G/DL (ref 6–8.4)
RBC # BLD AUTO: 4.95 M/UL (ref 4.6–6.2)
SODIUM SERPL-SCNC: 139 MMOL/L (ref 136–145)
TRIGL SERPL-MCNC: 331 MG/DL (ref 30–150)
TSH SERPL DL<=0.005 MIU/L-ACNC: 1.72 UIU/ML (ref 0.4–4)
WBC # BLD AUTO: 13.23 K/UL (ref 3.9–12.7)

## 2021-04-19 PROCEDURE — 85025 COMPLETE CBC W/AUTO DIFF WBC: CPT | Performed by: FAMILY MEDICINE

## 2021-04-19 PROCEDURE — 99999 PR PBB SHADOW E&M-EST. PATIENT-LVL IV: ICD-10-PCS | Mod: PBBFAC,,, | Performed by: FAMILY MEDICINE

## 2021-04-19 PROCEDURE — 3008F BODY MASS INDEX DOCD: CPT | Mod: CPTII,S$GLB,, | Performed by: FAMILY MEDICINE

## 2021-04-19 PROCEDURE — 84443 ASSAY THYROID STIM HORMONE: CPT | Performed by: FAMILY MEDICINE

## 2021-04-19 PROCEDURE — 80053 COMPREHEN METABOLIC PANEL: CPT | Performed by: FAMILY MEDICINE

## 2021-04-19 PROCEDURE — 3008F PR BODY MASS INDEX (BMI) DOCUMENTED: ICD-10-PCS | Mod: CPTII,S$GLB,, | Performed by: FAMILY MEDICINE

## 2021-04-19 PROCEDURE — 82607 VITAMIN B-12: CPT | Performed by: FAMILY MEDICINE

## 2021-04-19 PROCEDURE — 82306 VITAMIN D 25 HYDROXY: CPT | Performed by: FAMILY MEDICINE

## 2021-04-19 PROCEDURE — 99999 PR PBB SHADOW E&M-EST. PATIENT-LVL IV: CPT | Mod: PBBFAC,,, | Performed by: FAMILY MEDICINE

## 2021-04-19 PROCEDURE — 1125F PR PAIN SEVERITY QUANTIFIED, PAIN PRESENT: ICD-10-PCS | Mod: S$GLB,,, | Performed by: FAMILY MEDICINE

## 2021-04-19 PROCEDURE — 1125F AMNT PAIN NOTED PAIN PRSNT: CPT | Mod: S$GLB,,, | Performed by: FAMILY MEDICINE

## 2021-04-19 PROCEDURE — 99214 PR OFFICE/OUTPT VISIT, EST, LEVL IV, 30-39 MIN: ICD-10-PCS | Mod: S$GLB,,, | Performed by: FAMILY MEDICINE

## 2021-04-19 PROCEDURE — 82746 ASSAY OF FOLIC ACID SERUM: CPT | Performed by: FAMILY MEDICINE

## 2021-04-19 PROCEDURE — 99214 OFFICE O/P EST MOD 30 MIN: CPT | Mod: S$GLB,,, | Performed by: FAMILY MEDICINE

## 2021-04-19 PROCEDURE — 36415 COLL VENOUS BLD VENIPUNCTURE: CPT | Mod: PO | Performed by: FAMILY MEDICINE

## 2021-04-19 PROCEDURE — 80061 LIPID PANEL: CPT | Performed by: FAMILY MEDICINE

## 2021-04-20 DIAGNOSIS — R93.7 ABNORMAL MAGNETIC RESONANCE IMAGING OF CERVICAL SPINE: Primary | ICD-10-CM

## 2021-04-20 LAB
25(OH)D3+25(OH)D2 SERPL-MCNC: 16 NG/ML (ref 30–96)
FOLATE SERPL-MCNC: 5.8 NG/ML (ref 4–24)
VIT B12 SERPL-MCNC: 301 PG/ML (ref 210–950)

## 2021-04-20 RX ORDER — ERGOCALCIFEROL 1.25 MG/1
50000 CAPSULE ORAL
Qty: 4 CAPSULE | Refills: 3 | Status: SHIPPED | OUTPATIENT
Start: 2021-04-20 | End: 2021-07-17

## 2021-04-21 ENCOUNTER — TELEPHONE (OUTPATIENT)
Dept: NEUROLOGY | Facility: CLINIC | Age: 26
End: 2021-04-21

## 2021-04-26 ENCOUNTER — PATIENT MESSAGE (OUTPATIENT)
Dept: FAMILY MEDICINE | Facility: CLINIC | Age: 26
End: 2021-04-26

## 2021-04-28 ENCOUNTER — TELEPHONE (OUTPATIENT)
Dept: NEUROLOGY | Facility: CLINIC | Age: 26
End: 2021-04-28

## 2021-04-28 ENCOUNTER — TELEPHONE (OUTPATIENT)
Dept: FAMILY MEDICINE | Facility: CLINIC | Age: 26
End: 2021-04-28

## 2021-04-28 ENCOUNTER — PATIENT MESSAGE (OUTPATIENT)
Dept: FAMILY MEDICINE | Facility: CLINIC | Age: 26
End: 2021-04-28

## 2021-04-29 ENCOUNTER — TELEPHONE (OUTPATIENT)
Dept: NEUROLOGY | Facility: CLINIC | Age: 26
End: 2021-04-29

## 2021-04-29 ENCOUNTER — PATIENT MESSAGE (OUTPATIENT)
Dept: RESEARCH | Facility: HOSPITAL | Age: 26
End: 2021-04-29

## 2021-05-19 ENCOUNTER — PATIENT OUTREACH (OUTPATIENT)
Dept: ADMINISTRATIVE | Facility: OTHER | Age: 26
End: 2021-05-19

## 2021-05-20 ENCOUNTER — TELEPHONE (OUTPATIENT)
Dept: OPHTHALMOLOGY | Facility: CLINIC | Age: 26
End: 2021-05-20

## 2021-05-20 ENCOUNTER — LAB VISIT (OUTPATIENT)
Dept: LAB | Facility: HOSPITAL | Age: 26
End: 2021-05-20
Attending: PSYCHIATRY & NEUROLOGY
Payer: COMMERCIAL

## 2021-05-20 ENCOUNTER — OFFICE VISIT (OUTPATIENT)
Dept: NEUROLOGY | Facility: CLINIC | Age: 26
End: 2021-05-20
Payer: COMMERCIAL

## 2021-05-20 VITALS
TEMPERATURE: 98 F | SYSTOLIC BLOOD PRESSURE: 109 MMHG | DIASTOLIC BLOOD PRESSURE: 74 MMHG | HEART RATE: 72 BPM | WEIGHT: 312.19 LBS | HEIGHT: 70 IN | BODY MASS INDEX: 44.69 KG/M2

## 2021-05-20 DIAGNOSIS — R93.7 ABNORMAL MAGNETIC RESONANCE IMAGING OF CERVICAL SPINE: Primary | ICD-10-CM

## 2021-05-20 DIAGNOSIS — R93.7 ABNORMAL MAGNETIC RESONANCE IMAGING OF CERVICAL SPINE: ICD-10-CM

## 2021-05-20 DIAGNOSIS — R90.82 WHITE MATTER ABNORMALITY ON MRI OF BRAIN: ICD-10-CM

## 2021-05-20 DIAGNOSIS — H53.8 BLURRED VISION, BILATERAL: ICD-10-CM

## 2021-05-20 DIAGNOSIS — M79.2 NEUROPATHIC PAIN: ICD-10-CM

## 2021-05-20 DIAGNOSIS — F40.240 CLAUSTROPHOBIA: ICD-10-CM

## 2021-05-20 PROCEDURE — 86480 TB TEST CELL IMMUN MEASURE: CPT | Performed by: PSYCHIATRY & NEUROLOGY

## 2021-05-20 PROCEDURE — 86255 FLUORESCENT ANTIBODY SCREEN: CPT | Mod: 59

## 2021-05-20 PROCEDURE — 99999 PR PBB SHADOW E&M-EST. PATIENT-LVL IV: ICD-10-PCS | Mod: PBBFAC,,, | Performed by: PSYCHIATRY & NEUROLOGY

## 2021-05-20 PROCEDURE — 3008F PR BODY MASS INDEX (BMI) DOCUMENTED: ICD-10-PCS | Mod: CPTII,S$GLB,, | Performed by: PSYCHIATRY & NEUROLOGY

## 2021-05-20 PROCEDURE — 3008F BODY MASS INDEX DOCD: CPT | Mod: CPTII,S$GLB,, | Performed by: PSYCHIATRY & NEUROLOGY

## 2021-05-20 PROCEDURE — 36415 COLL VENOUS BLD VENIPUNCTURE: CPT | Performed by: PSYCHIATRY & NEUROLOGY

## 2021-05-20 PROCEDURE — 1125F AMNT PAIN NOTED PAIN PRSNT: CPT | Mod: S$GLB,,, | Performed by: PSYCHIATRY & NEUROLOGY

## 2021-05-20 PROCEDURE — 99215 PR OFFICE/OUTPT VISIT, EST, LEVL V, 40-54 MIN: ICD-10-PCS | Mod: S$GLB,,, | Performed by: PSYCHIATRY & NEUROLOGY

## 2021-05-20 PROCEDURE — 99999 PR PBB SHADOW E&M-EST. PATIENT-LVL IV: CPT | Mod: PBBFAC,,, | Performed by: PSYCHIATRY & NEUROLOGY

## 2021-05-20 PROCEDURE — 99215 OFFICE O/P EST HI 40 MIN: CPT | Mod: S$GLB,,, | Performed by: PSYCHIATRY & NEUROLOGY

## 2021-05-20 PROCEDURE — 1125F PR PAIN SEVERITY QUANTIFIED, PAIN PRESENT: ICD-10-PCS | Mod: S$GLB,,, | Performed by: PSYCHIATRY & NEUROLOGY

## 2021-05-20 RX ORDER — GABAPENTIN 300 MG/1
300 CAPSULE ORAL 3 TIMES DAILY
Qty: 90 CAPSULE | Refills: 2 | Status: SHIPPED | OUTPATIENT
Start: 2021-05-20 | End: 2022-03-07

## 2021-05-20 RX ORDER — DIAZEPAM 10 MG/1
TABLET ORAL
Qty: 2 TABLET | Refills: 0 | Status: SHIPPED | OUTPATIENT
Start: 2021-05-20 | End: 2021-06-03 | Stop reason: ALTCHOICE

## 2021-05-24 LAB
GAMMA INTERFERON BACKGROUND BLD IA-ACNC: 0.02 IU/ML
M TB IFN-G CD4+ BCKGRND COR BLD-ACNC: 0 IU/ML
MITOGEN IGNF BCKGRD COR BLD-ACNC: 8.2 IU/ML
TB GOLD PLUS: NEGATIVE
TB2 - NIL: 0 IU/ML

## 2021-05-28 ENCOUNTER — HOSPITAL ENCOUNTER (OUTPATIENT)
Dept: RADIOLOGY | Facility: HOSPITAL | Age: 26
Discharge: HOME | End: 2021-05-28
Attending: PSYCHIATRY & NEUROLOGY
Payer: COMMERCIAL

## 2021-05-28 DIAGNOSIS — R93.7 ABNORMAL MAGNETIC RESONANCE IMAGING OF CERVICAL SPINE: ICD-10-CM

## 2021-05-28 DIAGNOSIS — R90.82 WHITE MATTER ABNORMALITY ON MRI OF BRAIN: ICD-10-CM

## 2021-05-28 PROCEDURE — 70553 MRI BRAIN STEM W/O & W/DYE: CPT | Mod: 26,,, | Performed by: RADIOLOGY

## 2021-05-28 PROCEDURE — 72157 MRI THORACIC SPINE DEMYELINATING W W/O CONTRAST: ICD-10-PCS | Mod: 26,,, | Performed by: RADIOLOGY

## 2021-05-28 PROCEDURE — 72157 MRI CHEST SPINE W/O & W/DYE: CPT | Mod: TC

## 2021-05-28 PROCEDURE — 72157 MRI CHEST SPINE W/O & W/DYE: CPT | Mod: 26,,, | Performed by: RADIOLOGY

## 2021-05-28 PROCEDURE — 70553 MRI BRAIN DEMYELINATING W/ WO CONTRAST: ICD-10-PCS | Mod: 26,,, | Performed by: RADIOLOGY

## 2021-05-28 PROCEDURE — 70553 MRI BRAIN STEM W/O & W/DYE: CPT | Mod: TC

## 2021-05-28 PROCEDURE — 25500020 PHARM REV CODE 255: Performed by: PSYCHIATRY & NEUROLOGY

## 2021-05-28 PROCEDURE — A9585 GADOBUTROL INJECTION: HCPCS | Performed by: PSYCHIATRY & NEUROLOGY

## 2021-05-28 PROCEDURE — 72156 MRI CERVICAL SPINE DEMYELINATING W W/O CONTRAST: ICD-10-PCS | Mod: 26,,, | Performed by: RADIOLOGY

## 2021-05-28 PROCEDURE — 72156 MRI NECK SPINE W/O & W/DYE: CPT | Mod: 26,,, | Performed by: RADIOLOGY

## 2021-05-28 PROCEDURE — 72156 MRI NECK SPINE W/O & W/DYE: CPT | Mod: TC

## 2021-05-28 RX ORDER — GADOBUTROL 604.72 MG/ML
10 INJECTION INTRAVENOUS
Status: COMPLETED | OUTPATIENT
Start: 2021-05-28 | End: 2021-05-28

## 2021-05-28 RX ADMIN — GADOBUTROL 10 ML: 604.72 INJECTION INTRAVENOUS at 08:05

## 2021-06-03 ENCOUNTER — OFFICE VISIT (OUTPATIENT)
Dept: NEUROLOGY | Facility: CLINIC | Age: 26
End: 2021-06-03
Payer: COMMERCIAL

## 2021-06-03 ENCOUNTER — TELEPHONE (OUTPATIENT)
Dept: NEUROLOGY | Facility: CLINIC | Age: 26
End: 2021-06-03

## 2021-06-03 VITALS
WEIGHT: 315 LBS | BODY MASS INDEX: 45.1 KG/M2 | DIASTOLIC BLOOD PRESSURE: 89 MMHG | HEART RATE: 88 BPM | HEIGHT: 70 IN | SYSTOLIC BLOOD PRESSURE: 139 MMHG

## 2021-06-03 DIAGNOSIS — Z51.81 ENCOUNTER FOR MONITORING IMMUNOMODULATING THERAPY: ICD-10-CM

## 2021-06-03 DIAGNOSIS — G35 MULTIPLE SCLEROSIS, RELAPSING-REMITTING: Primary | ICD-10-CM

## 2021-06-03 DIAGNOSIS — Z79.899 ENCOUNTER FOR MONITORING IMMUNOMODULATING THERAPY: ICD-10-CM

## 2021-06-03 DIAGNOSIS — R90.82 WHITE MATTER ABNORMALITY ON MRI OF BRAIN: ICD-10-CM

## 2021-06-03 PROCEDURE — 1126F AMNT PAIN NOTED NONE PRSNT: CPT | Mod: S$GLB,,, | Performed by: PSYCHIATRY & NEUROLOGY

## 2021-06-03 PROCEDURE — 99215 OFFICE O/P EST HI 40 MIN: CPT | Mod: S$GLB,,, | Performed by: PSYCHIATRY & NEUROLOGY

## 2021-06-03 PROCEDURE — 99999 PR PBB SHADOW E&M-EST. PATIENT-LVL III: CPT | Mod: PBBFAC,,, | Performed by: PSYCHIATRY & NEUROLOGY

## 2021-06-03 PROCEDURE — 3008F BODY MASS INDEX DOCD: CPT | Mod: CPTII,S$GLB,, | Performed by: PSYCHIATRY & NEUROLOGY

## 2021-06-03 PROCEDURE — 99999 PR PBB SHADOW E&M-EST. PATIENT-LVL III: ICD-10-PCS | Mod: PBBFAC,,, | Performed by: PSYCHIATRY & NEUROLOGY

## 2021-06-03 PROCEDURE — 1126F PR PAIN SEVERITY QUANTIFIED, NO PAIN PRESENT: ICD-10-PCS | Mod: S$GLB,,, | Performed by: PSYCHIATRY & NEUROLOGY

## 2021-06-03 PROCEDURE — 99215 PR OFFICE/OUTPT VISIT, EST, LEVL V, 40-54 MIN: ICD-10-PCS | Mod: S$GLB,,, | Performed by: PSYCHIATRY & NEUROLOGY

## 2021-06-03 PROCEDURE — 3008F PR BODY MASS INDEX (BMI) DOCUMENTED: ICD-10-PCS | Mod: CPTII,S$GLB,, | Performed by: PSYCHIATRY & NEUROLOGY

## 2021-06-17 ENCOUNTER — TELEPHONE (OUTPATIENT)
Dept: NEUROLOGY | Facility: CLINIC | Age: 26
End: 2021-06-17

## 2021-06-17 DIAGNOSIS — G35 MULTIPLE SCLEROSIS: ICD-10-CM

## 2021-06-22 PROBLEM — G35 MULTIPLE SCLEROSIS: Status: ACTIVE | Noted: 2021-06-22

## 2021-07-06 ENCOUNTER — PATIENT MESSAGE (OUTPATIENT)
Dept: NEUROLOGY | Facility: CLINIC | Age: 26
End: 2021-07-06

## 2021-07-08 ENCOUNTER — PATIENT OUTREACH (OUTPATIENT)
Dept: ADMINISTRATIVE | Facility: OTHER | Age: 26
End: 2021-07-08

## 2021-07-09 ENCOUNTER — OFFICE VISIT (OUTPATIENT)
Dept: INFECTIOUS DISEASES | Facility: CLINIC | Age: 26
End: 2021-07-09
Payer: COMMERCIAL

## 2021-07-09 ENCOUNTER — OFFICE VISIT (OUTPATIENT)
Dept: OPHTHALMOLOGY | Facility: CLINIC | Age: 26
End: 2021-07-09
Payer: COMMERCIAL

## 2021-07-09 ENCOUNTER — CLINICAL SUPPORT (OUTPATIENT)
Dept: OPHTHALMOLOGY | Facility: CLINIC | Age: 26
End: 2021-07-09
Payer: COMMERCIAL

## 2021-07-09 VITALS
HEART RATE: 67 BPM | WEIGHT: 312.81 LBS | DIASTOLIC BLOOD PRESSURE: 82 MMHG | BODY MASS INDEX: 44.78 KG/M2 | HEIGHT: 70 IN | SYSTOLIC BLOOD PRESSURE: 141 MMHG | TEMPERATURE: 99 F

## 2021-07-09 DIAGNOSIS — Z23 NEED FOR PNEUMOCOCCAL VACCINATION: ICD-10-CM

## 2021-07-09 DIAGNOSIS — Q07.8 ANOMALOUS OPTIC NERVE: ICD-10-CM

## 2021-07-09 DIAGNOSIS — Z23 NEED FOR TDAP VACCINATION: ICD-10-CM

## 2021-07-09 DIAGNOSIS — G35 MULTIPLE SCLEROSIS: Primary | ICD-10-CM

## 2021-07-09 DIAGNOSIS — Z71.85 VACCINE COUNSELING: ICD-10-CM

## 2021-07-09 DIAGNOSIS — G35 MULTIPLE SCLEROSIS: ICD-10-CM

## 2021-07-09 DIAGNOSIS — H53.40 VISUAL FIELD DEFECT: ICD-10-CM

## 2021-07-09 DIAGNOSIS — H46.9 OPTIC NEURITIS: Primary | ICD-10-CM

## 2021-07-09 DIAGNOSIS — D84.9 IMMUNOSUPPRESSED STATUS: ICD-10-CM

## 2021-07-09 DIAGNOSIS — Z23 NEED FOR HEPATITIS A IMMUNIZATION: ICD-10-CM

## 2021-07-09 DIAGNOSIS — Z23 NEED FOR HEPATITIS B VACCINATION: ICD-10-CM

## 2021-07-09 PROCEDURE — 90715 TDAP VACCINE GREATER THAN OR EQUAL TO 7YO IM: ICD-10-PCS | Mod: S$GLB,,, | Performed by: NURSE PRACTITIONER

## 2021-07-09 PROCEDURE — 99204 OFFICE O/P NEW MOD 45 MIN: CPT | Mod: 25,S$GLB,, | Performed by: NURSE PRACTITIONER

## 2021-07-09 PROCEDURE — 90739 HEPATITIS B (RECOMBINANT) ADJUVANTED, 2 DOSE: ICD-10-PCS | Mod: S$GLB,,, | Performed by: NURSE PRACTITIONER

## 2021-07-09 PROCEDURE — 90472 TDAP VACCINE GREATER THAN OR EQUAL TO 7YO IM: ICD-10-PCS | Mod: S$GLB,,, | Performed by: NURSE PRACTITIONER

## 2021-07-09 PROCEDURE — 3008F BODY MASS INDEX DOCD: CPT | Mod: CPTII,S$GLB,, | Performed by: NURSE PRACTITIONER

## 2021-07-09 PROCEDURE — 90632 HEPA VACCINE ADULT IM: CPT | Mod: S$GLB,,, | Performed by: NURSE PRACTITIONER

## 2021-07-09 PROCEDURE — 1126F PR PAIN SEVERITY QUANTIFIED, NO PAIN PRESENT: ICD-10-PCS | Mod: S$GLB,,, | Performed by: STUDENT IN AN ORGANIZED HEALTH CARE EDUCATION/TRAINING PROGRAM

## 2021-07-09 PROCEDURE — 99205 PR OFFICE/OUTPT VISIT, NEW, LEVL V, 60-74 MIN: ICD-10-PCS | Mod: S$GLB,,, | Performed by: STUDENT IN AN ORGANIZED HEALTH CARE EDUCATION/TRAINING PROGRAM

## 2021-07-09 PROCEDURE — 99999 PR PBB SHADOW E&M-EST. PATIENT-LVL II: ICD-10-PCS | Mod: PBBFAC,,, | Performed by: STUDENT IN AN ORGANIZED HEALTH CARE EDUCATION/TRAINING PROGRAM

## 2021-07-09 PROCEDURE — 92083 EXTENDED VISUAL FIELD XM: CPT | Mod: S$GLB,,, | Performed by: STUDENT IN AN ORGANIZED HEALTH CARE EDUCATION/TRAINING PROGRAM

## 2021-07-09 PROCEDURE — 90471 HEPATITIS B (RECOMBINANT) ADJUVANTED, 2 DOSE: ICD-10-PCS | Mod: S$GLB,,, | Performed by: NURSE PRACTITIONER

## 2021-07-09 PROCEDURE — 3008F PR BODY MASS INDEX (BMI) DOCUMENTED: ICD-10-PCS | Mod: CPTII,S$GLB,, | Performed by: NURSE PRACTITIONER

## 2021-07-09 PROCEDURE — 90715 TDAP VACCINE 7 YRS/> IM: CPT | Mod: S$GLB,,, | Performed by: NURSE PRACTITIONER

## 2021-07-09 PROCEDURE — 90632 HEPATITIS A VACCINE ADULT IM: ICD-10-PCS | Mod: S$GLB,,, | Performed by: NURSE PRACTITIONER

## 2021-07-09 PROCEDURE — 1126F PR PAIN SEVERITY QUANTIFIED, NO PAIN PRESENT: ICD-10-PCS | Mod: S$GLB,,, | Performed by: NURSE PRACTITIONER

## 2021-07-09 PROCEDURE — 99999 PR PBB SHADOW E&M-EST. PATIENT-LVL III: CPT | Mod: PBBFAC,,, | Performed by: NURSE PRACTITIONER

## 2021-07-09 PROCEDURE — 90670 PCV13 VACCINE IM: CPT | Mod: S$GLB,,, | Performed by: NURSE PRACTITIONER

## 2021-07-09 PROCEDURE — 90472 IMMUNIZATION ADMIN EACH ADD: CPT | Mod: S$GLB,,, | Performed by: NURSE PRACTITIONER

## 2021-07-09 PROCEDURE — 92133 CPTRZD OPH DX IMG PST SGM ON: CPT | Mod: S$GLB,,, | Performed by: STUDENT IN AN ORGANIZED HEALTH CARE EDUCATION/TRAINING PROGRAM

## 2021-07-09 PROCEDURE — 92133 POSTERIOR SEGMENT OCT OPTIC NERVE(OCULAR COHERENCE TOMOGRAPHY) - OU - BOTH EYES: ICD-10-PCS | Mod: S$GLB,,, | Performed by: STUDENT IN AN ORGANIZED HEALTH CARE EDUCATION/TRAINING PROGRAM

## 2021-07-09 PROCEDURE — 99204 PR OFFICE/OUTPT VISIT, NEW, LEVL IV, 45-59 MIN: ICD-10-PCS | Mod: 25,S$GLB,, | Performed by: NURSE PRACTITIONER

## 2021-07-09 PROCEDURE — 99999 PR PBB SHADOW E&M-EST. PATIENT-LVL II: CPT | Mod: PBBFAC,,, | Performed by: STUDENT IN AN ORGANIZED HEALTH CARE EDUCATION/TRAINING PROGRAM

## 2021-07-09 PROCEDURE — 1126F AMNT PAIN NOTED NONE PRSNT: CPT | Mod: S$GLB,,, | Performed by: STUDENT IN AN ORGANIZED HEALTH CARE EDUCATION/TRAINING PROGRAM

## 2021-07-09 PROCEDURE — 99999 PR PBB SHADOW E&M-EST. PATIENT-LVL III: ICD-10-PCS | Mod: PBBFAC,,, | Performed by: NURSE PRACTITIONER

## 2021-07-09 PROCEDURE — 90670 PNEUMOCOCCAL CONJUGATE VACCINE 13-VALENT LESS THAN 5YO & GREATER THAN: ICD-10-PCS | Mod: S$GLB,,, | Performed by: NURSE PRACTITIONER

## 2021-07-09 PROCEDURE — 1126F AMNT PAIN NOTED NONE PRSNT: CPT | Mod: S$GLB,,, | Performed by: NURSE PRACTITIONER

## 2021-07-09 PROCEDURE — 99205 OFFICE O/P NEW HI 60 MIN: CPT | Mod: S$GLB,,, | Performed by: STUDENT IN AN ORGANIZED HEALTH CARE EDUCATION/TRAINING PROGRAM

## 2021-07-09 PROCEDURE — 92083 HUMPHREY VISUAL FIELD - OU - BOTH EYES: ICD-10-PCS | Mod: S$GLB,,, | Performed by: STUDENT IN AN ORGANIZED HEALTH CARE EDUCATION/TRAINING PROGRAM

## 2021-07-09 PROCEDURE — 90471 IMMUNIZATION ADMIN: CPT | Mod: S$GLB,,, | Performed by: NURSE PRACTITIONER

## 2021-07-09 PROCEDURE — 90739 HEPB VACC 2/4 DOSE ADULT IM: CPT | Mod: S$GLB,,, | Performed by: NURSE PRACTITIONER

## 2021-07-17 RX ORDER — ERGOCALCIFEROL 1.25 MG/1
CAPSULE ORAL
Qty: 4 CAPSULE | Refills: 3 | Status: SHIPPED | OUTPATIENT
Start: 2021-07-17 | End: 2022-03-07 | Stop reason: SDUPTHER

## 2021-07-26 ENCOUNTER — PATIENT MESSAGE (OUTPATIENT)
Dept: NEUROLOGY | Facility: CLINIC | Age: 26
End: 2021-07-26

## 2021-08-10 ENCOUNTER — PATIENT MESSAGE (OUTPATIENT)
Dept: NEUROLOGY | Facility: CLINIC | Age: 26
End: 2021-08-10

## 2021-08-20 ENCOUNTER — TELEPHONE (OUTPATIENT)
Dept: NEUROLOGY | Facility: CLINIC | Age: 26
End: 2021-08-20

## 2021-08-20 NOTE — TELEPHONE ENCOUNTER
----- Message from Winnie Aguayo sent at 8/20/2021 10:19 AM CDT -----  Regarding: pt advice  Contact: Peter @ [phone]  Rec'd call from Peter ([employer]) calling to speak with someone (Gabbie) in Dr. Ivan's office regarding patient. Please call.

## 2021-08-26 ENCOUNTER — PATIENT MESSAGE (OUTPATIENT)
Dept: NEUROLOGY | Facility: CLINIC | Age: 26
End: 2021-08-26

## 2021-08-26 DIAGNOSIS — G35 MULTIPLE SCLEROSIS: Primary | ICD-10-CM

## 2021-08-26 RX ORDER — EPINEPHRINE 0.3 MG/.3ML
0.3 INJECTION SUBCUTANEOUS
Status: CANCELLED | OUTPATIENT
Start: 2021-08-26

## 2021-08-26 RX ORDER — HEPARIN 100 UNIT/ML
500 SYRINGE INTRAVENOUS
Status: CANCELLED | OUTPATIENT
Start: 2021-08-26

## 2021-08-26 RX ORDER — FAMOTIDINE 10 MG/ML
20 INJECTION INTRAVENOUS
Status: CANCELLED | OUTPATIENT
Start: 2021-08-26

## 2021-08-26 RX ORDER — SODIUM CHLORIDE 0.9 % (FLUSH) 0.9 %
10 SYRINGE (ML) INJECTION
Status: CANCELLED | OUTPATIENT
Start: 2021-08-26

## 2021-08-26 RX ORDER — ACETAMINOPHEN 500 MG
1000 TABLET ORAL
Status: CANCELLED | OUTPATIENT
Start: 2021-08-26

## 2021-08-26 RX ORDER — DIPHENHYDRAMINE HYDROCHLORIDE 50 MG/ML
50 INJECTION INTRAMUSCULAR; INTRAVENOUS
Status: CANCELLED | OUTPATIENT
Start: 2021-08-26

## 2021-09-04 ENCOUNTER — PATIENT MESSAGE (OUTPATIENT)
Dept: NEUROLOGY | Facility: CLINIC | Age: 26
End: 2021-09-04

## 2021-09-09 ENCOUNTER — PATIENT MESSAGE (OUTPATIENT)
Dept: NEUROLOGY | Facility: CLINIC | Age: 26
End: 2021-09-09

## 2021-09-09 ENCOUNTER — TELEPHONE (OUTPATIENT)
Dept: INFUSION THERAPY | Facility: HOSPITAL | Age: 26
End: 2021-09-09

## 2021-09-09 DIAGNOSIS — R68.89 HEAT INTOLERANCE: ICD-10-CM

## 2021-09-09 DIAGNOSIS — G35 MULTIPLE SCLEROSIS: Primary | ICD-10-CM

## 2021-09-14 ENCOUNTER — PATIENT MESSAGE (OUTPATIENT)
Dept: INFECTIOUS DISEASES | Facility: CLINIC | Age: 26
End: 2021-09-14

## 2021-09-15 ENCOUNTER — INFUSION (OUTPATIENT)
Dept: INFUSION THERAPY | Facility: HOSPITAL | Age: 26
End: 2021-09-15
Attending: PSYCHIATRY & NEUROLOGY
Payer: COMMERCIAL

## 2021-09-15 VITALS
DIASTOLIC BLOOD PRESSURE: 73 MMHG | SYSTOLIC BLOOD PRESSURE: 137 MMHG | TEMPERATURE: 98 F | HEIGHT: 70 IN | WEIGHT: 312.38 LBS | RESPIRATION RATE: 18 BRPM | HEART RATE: 82 BPM | BODY MASS INDEX: 44.72 KG/M2

## 2021-09-15 DIAGNOSIS — G35 MULTIPLE SCLEROSIS: Primary | ICD-10-CM

## 2021-09-15 PROCEDURE — 63600175 PHARM REV CODE 636 W HCPCS: Mod: PN | Performed by: PSYCHIATRY & NEUROLOGY

## 2021-09-15 PROCEDURE — 96413 CHEMO IV INFUSION 1 HR: CPT | Mod: PN

## 2021-09-15 PROCEDURE — 96415 CHEMO IV INFUSION ADDL HR: CPT | Mod: PN

## 2021-09-15 PROCEDURE — 25000003 PHARM REV CODE 250: Mod: PN | Performed by: PSYCHIATRY & NEUROLOGY

## 2021-09-15 PROCEDURE — 96367 TX/PROPH/DG ADDL SEQ IV INF: CPT | Mod: PN

## 2021-09-15 PROCEDURE — 96375 TX/PRO/DX INJ NEW DRUG ADDON: CPT | Mod: PN

## 2021-09-15 RX ORDER — ACETAMINOPHEN 500 MG
1000 TABLET ORAL
Status: CANCELLED | OUTPATIENT
Start: 2021-09-29

## 2021-09-15 RX ORDER — ACETAMINOPHEN 500 MG
1000 TABLET ORAL
Status: COMPLETED | OUTPATIENT
Start: 2021-09-15 | End: 2021-09-15

## 2021-09-15 RX ORDER — EPINEPHRINE 0.3 MG/.3ML
0.3 INJECTION SUBCUTANEOUS
Status: DISCONTINUED | OUTPATIENT
Start: 2021-09-15 | End: 2021-09-15 | Stop reason: HOSPADM

## 2021-09-15 RX ORDER — SODIUM CHLORIDE 0.9 % (FLUSH) 0.9 %
10 SYRINGE (ML) INJECTION
Status: DISCONTINUED | OUTPATIENT
Start: 2021-09-15 | End: 2021-09-15 | Stop reason: HOSPADM

## 2021-09-15 RX ORDER — DIPHENHYDRAMINE HYDROCHLORIDE 50 MG/ML
50 INJECTION INTRAMUSCULAR; INTRAVENOUS
Status: DISCONTINUED | OUTPATIENT
Start: 2021-09-15 | End: 2021-09-15 | Stop reason: HOSPADM

## 2021-09-15 RX ORDER — METHYLPREDNISOLONE SOD SUCC 125 MG
100 VIAL (EA) INJECTION
Status: CANCELLED
Start: 2021-09-29

## 2021-09-15 RX ORDER — EPINEPHRINE 0.3 MG/.3ML
0.3 INJECTION SUBCUTANEOUS
Status: CANCELLED | OUTPATIENT
Start: 2021-09-29

## 2021-09-15 RX ORDER — HEPARIN 100 UNIT/ML
500 SYRINGE INTRAVENOUS
Status: CANCELLED | OUTPATIENT
Start: 2021-09-29

## 2021-09-15 RX ORDER — FAMOTIDINE 10 MG/ML
20 INJECTION INTRAVENOUS
Status: CANCELLED | OUTPATIENT
Start: 2021-09-29

## 2021-09-15 RX ORDER — METHYLPREDNISOLONE SOD SUCC 125 MG
100 VIAL (EA) INJECTION
Status: COMPLETED | OUTPATIENT
Start: 2021-09-15 | End: 2021-09-15

## 2021-09-15 RX ORDER — SODIUM CHLORIDE 0.9 % (FLUSH) 0.9 %
10 SYRINGE (ML) INJECTION
Status: CANCELLED | OUTPATIENT
Start: 2021-09-29

## 2021-09-15 RX ORDER — FAMOTIDINE 10 MG/ML
20 INJECTION INTRAVENOUS
Status: COMPLETED | OUTPATIENT
Start: 2021-09-15 | End: 2021-09-15

## 2021-09-15 RX ORDER — HEPARIN 100 UNIT/ML
500 SYRINGE INTRAVENOUS
Status: DISCONTINUED | OUTPATIENT
Start: 2021-09-15 | End: 2021-09-15 | Stop reason: HOSPADM

## 2021-09-15 RX ORDER — DIPHENHYDRAMINE HYDROCHLORIDE 50 MG/ML
50 INJECTION INTRAMUSCULAR; INTRAVENOUS
Status: CANCELLED | OUTPATIENT
Start: 2021-09-29

## 2021-09-15 RX ADMIN — ACETAMINOPHEN 1000 MG: 500 TABLET, FILM COATED ORAL at 09:09

## 2021-09-15 RX ADMIN — OCRELIZUMAB 300 MG: 300 INJECTION INTRAVENOUS at 10:09

## 2021-09-15 RX ADMIN — METHYLPREDNISOLONE SODIUM SUCCINATE 100 MG: 125 INJECTION, POWDER, FOR SOLUTION INTRAMUSCULAR; INTRAVENOUS at 09:09

## 2021-09-15 RX ADMIN — DIPHENHYDRAMINE HYDROCHLORIDE 50 MG: 50 INJECTION INTRAMUSCULAR; INTRAVENOUS at 09:09

## 2021-09-15 RX ADMIN — FAMOTIDINE 20 MG: 10 INJECTION INTRAVENOUS at 09:09

## 2021-09-15 RX ADMIN — SODIUM CHLORIDE: 9 INJECTION, SOLUTION INTRAVENOUS at 09:09

## 2021-09-28 ENCOUNTER — TELEPHONE (OUTPATIENT)
Dept: NEUROLOGY | Facility: CLINIC | Age: 26
End: 2021-09-28

## 2021-09-29 ENCOUNTER — INFUSION (OUTPATIENT)
Dept: INFUSION THERAPY | Facility: HOSPITAL | Age: 26
End: 2021-09-29
Attending: PSYCHIATRY & NEUROLOGY
Payer: COMMERCIAL

## 2021-09-29 VITALS
WEIGHT: 303.81 LBS | HEIGHT: 70 IN | RESPIRATION RATE: 18 BRPM | BODY MASS INDEX: 43.5 KG/M2 | HEART RATE: 81 BPM | DIASTOLIC BLOOD PRESSURE: 67 MMHG | TEMPERATURE: 98 F | SYSTOLIC BLOOD PRESSURE: 127 MMHG

## 2021-09-29 DIAGNOSIS — G35 MULTIPLE SCLEROSIS: Primary | ICD-10-CM

## 2021-09-29 PROCEDURE — 63600175 PHARM REV CODE 636 W HCPCS: Mod: PN | Performed by: PSYCHIATRY & NEUROLOGY

## 2021-09-29 PROCEDURE — 96413 CHEMO IV INFUSION 1 HR: CPT | Mod: PN

## 2021-09-29 PROCEDURE — 96415 CHEMO IV INFUSION ADDL HR: CPT | Mod: PN

## 2021-09-29 PROCEDURE — 25000003 PHARM REV CODE 250: Mod: PN | Performed by: PSYCHIATRY & NEUROLOGY

## 2021-09-29 PROCEDURE — 96375 TX/PRO/DX INJ NEW DRUG ADDON: CPT | Mod: PN

## 2021-09-29 PROCEDURE — 96367 TX/PROPH/DG ADDL SEQ IV INF: CPT | Mod: PN

## 2021-09-29 RX ORDER — METHYLPREDNISOLONE SOD SUCC 125 MG
100 VIAL (EA) INJECTION
Status: CANCELLED
Start: 2021-09-29

## 2021-09-29 RX ORDER — FAMOTIDINE 10 MG/ML
20 INJECTION INTRAVENOUS
Status: COMPLETED | OUTPATIENT
Start: 2021-09-29 | End: 2021-09-29

## 2021-09-29 RX ORDER — DIPHENHYDRAMINE HYDROCHLORIDE 50 MG/ML
50 INJECTION INTRAMUSCULAR; INTRAVENOUS
Status: CANCELLED | OUTPATIENT
Start: 2021-09-29

## 2021-09-29 RX ORDER — HEPARIN 100 UNIT/ML
500 SYRINGE INTRAVENOUS
Status: CANCELLED | OUTPATIENT
Start: 2021-09-29

## 2021-09-29 RX ORDER — SODIUM CHLORIDE 0.9 % (FLUSH) 0.9 %
10 SYRINGE (ML) INJECTION
Status: CANCELLED | OUTPATIENT
Start: 2021-09-29

## 2021-09-29 RX ORDER — ACETAMINOPHEN 500 MG
1000 TABLET ORAL
Status: CANCELLED | OUTPATIENT
Start: 2021-09-29

## 2021-09-29 RX ORDER — SODIUM CHLORIDE 0.9 % (FLUSH) 0.9 %
10 SYRINGE (ML) INJECTION
Status: DISCONTINUED | OUTPATIENT
Start: 2021-09-29 | End: 2021-09-29 | Stop reason: HOSPADM

## 2021-09-29 RX ORDER — METHYLPREDNISOLONE SOD SUCC 125 MG
100 VIAL (EA) INJECTION
Status: COMPLETED | OUTPATIENT
Start: 2021-09-29 | End: 2021-09-29

## 2021-09-29 RX ORDER — ACETAMINOPHEN 500 MG
1000 TABLET ORAL
Status: COMPLETED | OUTPATIENT
Start: 2021-09-29 | End: 2021-09-29

## 2021-09-29 RX ORDER — EPINEPHRINE 0.3 MG/.3ML
0.3 INJECTION SUBCUTANEOUS
Status: CANCELLED | OUTPATIENT
Start: 2021-09-29

## 2021-09-29 RX ORDER — FAMOTIDINE 10 MG/ML
20 INJECTION INTRAVENOUS
Status: CANCELLED | OUTPATIENT
Start: 2021-09-29

## 2021-09-29 RX ADMIN — DIPHENHYDRAMINE HYDROCHLORIDE 50 MG: 50 INJECTION INTRAMUSCULAR; INTRAVENOUS at 09:09

## 2021-09-29 RX ADMIN — SODIUM CHLORIDE: 0.9 INJECTION, SOLUTION INTRAVENOUS at 09:09

## 2021-09-29 RX ADMIN — FAMOTIDINE 20 MG: 10 INJECTION INTRAVENOUS at 09:09

## 2021-09-29 RX ADMIN — ACETAMINOPHEN 1000 MG: 500 TABLET, FILM COATED ORAL at 09:09

## 2021-09-29 RX ADMIN — OCRELIZUMAB 300 MG: 300 INJECTION INTRAVENOUS at 09:09

## 2021-09-29 RX ADMIN — METHYLPREDNISOLONE SODIUM SUCCINATE 100 MG: 125 INJECTION, POWDER, FOR SOLUTION INTRAMUSCULAR; INTRAVENOUS at 09:09

## 2021-10-04 ENCOUNTER — PATIENT MESSAGE (OUTPATIENT)
Dept: NEUROLOGY | Facility: CLINIC | Age: 26
End: 2021-10-04

## 2021-10-07 ENCOUNTER — PATIENT MESSAGE (OUTPATIENT)
Dept: PSYCHIATRY | Facility: CLINIC | Age: 26
End: 2021-10-07

## 2021-11-29 ENCOUNTER — PATIENT OUTREACH (OUTPATIENT)
Dept: ADMINISTRATIVE | Facility: OTHER | Age: 26
End: 2021-11-29
Payer: COMMERCIAL

## 2021-11-30 ENCOUNTER — OFFICE VISIT (OUTPATIENT)
Dept: NEUROLOGY | Facility: CLINIC | Age: 26
End: 2021-11-30
Payer: COMMERCIAL

## 2021-11-30 VITALS
DIASTOLIC BLOOD PRESSURE: 74 MMHG | WEIGHT: 304.25 LBS | HEART RATE: 86 BPM | HEIGHT: 70 IN | SYSTOLIC BLOOD PRESSURE: 120 MMHG | BODY MASS INDEX: 43.56 KG/M2

## 2021-11-30 DIAGNOSIS — E55.9 VITAMIN D DEFICIENCY: ICD-10-CM

## 2021-11-30 DIAGNOSIS — G35 MULTIPLE SCLEROSIS: Primary | ICD-10-CM

## 2021-11-30 PROCEDURE — 3074F SYST BP LT 130 MM HG: CPT | Mod: CPTII,S$GLB,, | Performed by: PSYCHIATRY & NEUROLOGY

## 2021-11-30 PROCEDURE — 99999 PR PBB SHADOW E&M-EST. PATIENT-LVL III: ICD-10-PCS | Mod: PBBFAC,,, | Performed by: PSYCHIATRY & NEUROLOGY

## 2021-11-30 PROCEDURE — 3074F PR MOST RECENT SYSTOLIC BLOOD PRESSURE < 130 MM HG: ICD-10-PCS | Mod: CPTII,S$GLB,, | Performed by: PSYCHIATRY & NEUROLOGY

## 2021-11-30 PROCEDURE — 1159F MED LIST DOCD IN RCRD: CPT | Mod: CPTII,S$GLB,, | Performed by: PSYCHIATRY & NEUROLOGY

## 2021-11-30 PROCEDURE — 99215 OFFICE O/P EST HI 40 MIN: CPT | Mod: S$GLB,,, | Performed by: PSYCHIATRY & NEUROLOGY

## 2021-11-30 PROCEDURE — 3078F PR MOST RECENT DIASTOLIC BLOOD PRESSURE < 80 MM HG: ICD-10-PCS | Mod: CPTII,S$GLB,, | Performed by: PSYCHIATRY & NEUROLOGY

## 2021-11-30 PROCEDURE — 3008F BODY MASS INDEX DOCD: CPT | Mod: CPTII,S$GLB,, | Performed by: PSYCHIATRY & NEUROLOGY

## 2021-11-30 PROCEDURE — 3078F DIAST BP <80 MM HG: CPT | Mod: CPTII,S$GLB,, | Performed by: PSYCHIATRY & NEUROLOGY

## 2021-11-30 PROCEDURE — 1159F PR MEDICATION LIST DOCUMENTED IN MEDICAL RECORD: ICD-10-PCS | Mod: CPTII,S$GLB,, | Performed by: PSYCHIATRY & NEUROLOGY

## 2021-11-30 PROCEDURE — 99215 PR OFFICE/OUTPT VISIT, EST, LEVL V, 40-54 MIN: ICD-10-PCS | Mod: S$GLB,,, | Performed by: PSYCHIATRY & NEUROLOGY

## 2021-11-30 PROCEDURE — 99999 PR PBB SHADOW E&M-EST. PATIENT-LVL III: CPT | Mod: PBBFAC,,, | Performed by: PSYCHIATRY & NEUROLOGY

## 2021-11-30 PROCEDURE — 3008F PR BODY MASS INDEX (BMI) DOCUMENTED: ICD-10-PCS | Mod: CPTII,S$GLB,, | Performed by: PSYCHIATRY & NEUROLOGY

## 2021-11-30 RX ORDER — ASPIRIN 325 MG
50000 TABLET, DELAYED RELEASE (ENTERIC COATED) ORAL
Qty: 12 CAPSULE | Refills: 1 | Status: SHIPPED | OUTPATIENT
Start: 2021-11-30 | End: 2022-03-07 | Stop reason: SDUPTHER

## 2021-11-30 NOTE — PROGRESS NOTES
Subjective:          Patient ID: Christiano Aguayo is a 26 y.o. male who presents today for a routine clinic visit for MS.      MS HPI:  · DMT: ocrelizumab, initial infusions 9/15 & 9/29/21  · Side effects from DMT? no  · Taking vitamin D3 as recommended? Yes, but out of refills  · Symptoms improved except the numbness on his hands. Fatigue is less.   · Only once with blurred vision in right eye. Resumed work, and was staring at computer screen  · MS questions answered.    Medications:  Current Outpatient Medications   Medication Sig    fluticasone propionate (FLONASE) 50 mcg/actuation nasal spray 2 sprays (100 mcg total) by Each Nostril route once daily. (Patient not taking: No sig reported)    gabapentin (NEURONTIN) 300 MG capsule Take 1 capsule (300 mg total) by mouth 3 (three) times daily. (Patient not taking: No sig reported)    VITAMIN D2 1,250 mcg (50,000 unit) capsule TAKE ONE CAPSULE EVERY WEEK ( EVERY 7 DAYS ) *THANK YOU* (Patient not taking: Reported on 11/30/2021)     No current facility-administered medications for this visit.       SOCIAL HISTORY  Social History     Tobacco Use    Smoking status: Never Smoker    Smokeless tobacco: Current User     Types: Snuff   Substance Use Topics    Alcohol use: Yes     Comment: rarely    Drug use: Never       Living arrangements - the patient lives with family    ROS:    REVIEW OF SYMPTOMS 11/30/2021   Do you feel abnormally tired on most days? No   Do you feel you generally sleep well? No   Do you have difficulty controlling your bladder?  No   Do you have difficulty controlling your bowels?  No   Do you have frequent muscle cramps, tightness or spasms in your limbs?  No   Do you have new visual symptoms?  No   Do you have worsening difficulty with your memory or thinking? No   Do you have worsening symptoms of anxiety or depression?  Yes   For patients who walk, Do you have more difficulty walking?  No   Have you fallen since your last visit?  No   For  patients who use wheelchairs: Do you have any skin wounds or breakdown? Not Applicable   Do you have difficulty using your hands?  Yes   Do you have shooting or burning pain? Yes   Do you have difficulty with sexual function?  Yes   If you are sexually active, are you using birth control? Y/N  N/A Not Applicable   Do you often choke when swallowing liquids or solid food?  No   Do you experience worsening symptoms when overheated? Yes   Do you need any new equipment such as a wheelchair, walker or shower chair? No   Do you receive co-pay financial assistance for your principal MS medicine? Yes   Would you be interested in participating in an MS research trial in the future? Yes   For patients on Gilenya, Tecfidera, Aubagio, Rituxan, Ocrevus, Tysabri, Lemtrada or Methotrexate, are you aware that you should NOT receive live virus vaccines?  Yes   Do you feel you have adequate family/friend support?  No   Do you have health insurance?   Yes   Are you currently employed? Yes   Do you receive SSDI/SSI?  Not Applicable   Do you use marijuana or cannabis products? No   Have you been diagnosed with a urinary tract infection since your last visit here? No   Have you been diagnosed with a respiratory tract infection since your last visit here? No   Have you been to the emergency room since your last visit here? No   Have you been hospitalized since your last visit here?  No                Objective:        1. 25 foot timed walk:  Timed 25 Foot Walk: 5/20/2021   Did patient wear an AFO? No   Was assistive device used? No   Time for 25 Foot Walk (seconds) 4.35   Time for 25 Foot Walk (seconds) 4.32       2. 9 Hole Peg Test:  No flowsheet data found.    Neurologic Exam  MENTAL STATUS: grossly intact. Normal language, attention, and memory.   CRANIAL NERVE EXAM: Extraocular muscles are intact.  No facial asymmetry. tongue midline. Shoulder shrug normal b/l There is no dysarthria.   MOTOR EXAM: Normal bulk and tone throughout UE  and LE bilaterally. Rapid sequential movements are normal. Strength is 5/5 in all groups in the lower extremities and upper extremities.   COORDINATION: Normal finger-to-nose exam.   GAIT: Narrow based and stable.      Imaging:     No results found for this or any previous visit.    No results found for this or any previous visit.    No results found for this or any previous visit.    Results for orders placed during the hospital encounter of 05/28/21    MRI Brain Demyelinating W W/O Contrast    Impression  1. The study is motion degraded.  There are 2 small regions of T2 prolongation in the brain which were not clearly present previously.  This slight discrepancy could be related to slice selection, volume averaging and patient motion.  There is subtle abnormal signal at the tip of the right cerebral peduncle as well as in the right frontal subcortical white matter.  There is no hemorrhage.  There is no abnormal enhancement in the brain.  Otherwise, minimal nonspecific white matter change, including subtle abnormal signal in the central dorsal star, is unchanged.  Again, the findings could reflect mild changes of demyelination, chronic small vessel disease.      Electronically signed by: Hossein Reyes MD  Date:    05/28/2021  Time:    09:01    Results for orders placed during the hospital encounter of 05/28/21    MRI Cervical Spine Demyelinating W W/O Contrast    Impression  1. Somewhat limited evaluation due to patient body habitus and motion.  There are, however, at least 3 new regions of abnormal signal intensity in the upper cord suggesting interval progression of demyelinating disease without obvious abnormal enhancement to suggest active demyelination but the postcontrast images are of suboptimal diagnostic quality.  There is abnormal cord signal at the levels of C1 and C2 which were not clearly present on comparison studies.  There is a stable focus of abnormal signal in the cord posteriorly on the left  at the level C5      Electronically signed by: Hossein Reyes MD  Date:    05/28/2021  Time:    09:11    Results for orders placed during the hospital encounter of 05/28/21    MRI Thoracic Spine Demyelinating W W/O Contrast    Impression  1. There is a similar appearance of the thoracic spine and cord when compared to the prior study.  The images are degraded by patient body habitus and motion.  There is no fracture or malalignment.  The spinal canal is somewhat small on a developmental basis and there is prominent dorsal epidural fat (epidural lipomatosis).  These findings are unchanged.  There is no significant spinal canal or foraminal stenosis.  2. There is suggestion of abnormal T2 hyperintense signal in the left lateral cord at the level of T12.  This is better demonstrated and is more conspicuous on the comparison study.  There is no other definite region of signal abnormality in the thoracic cord.  There is no abnormal enhancement.      Electronically signed by: Hossein Reyes MD  Date:    05/28/2021  Time:    08:50        Labs:     Lab Results   Component Value Date    [id number] 16 (L) 04/19/2021     Lab Results   Component Value Date    JCVINDEX 0.20 (H) 05/20/2021    JCVANTIBODY INDETERMINATE (A) 05/20/2021     Lab Results   Component Value Date    GU8OWIKF 71.2 05/20/2021    ABSOLUTECD3 2308 (H) 05/20/2021    IW2CKZXN 16.0 05/20/2021    ABSOLUTECD8 519 05/20/2021    MW0HGEBB 50.7 05/20/2021    ABSOLUTECD4 1643 (H) 05/20/2021    LABCD48 3.16 05/20/2021     Lab Results   Component Value Date    WBC 13.49 (H) 05/20/2021    RBC 5.24 05/20/2021    HGB 15.0 05/20/2021    HCT 44.7 05/20/2021    MCV 85 05/20/2021    MCH 28.6 05/20/2021    MCHC 33.6 05/20/2021    RDW 13.0 05/20/2021     05/20/2021    MPV 10.3 05/20/2021    GRAN 9.2 (H) 05/20/2021    GRAN 68.4 05/20/2021    LYMPH 3.1 05/20/2021    LYMPH 22.6 05/20/2021    MONO 0.8 05/20/2021    MONO 6.2 05/20/2021    EOS 0.3 05/20/2021    BASO 0.06  05/20/2021    EOSINOPHIL 2.0 05/20/2021    BASOPHIL 0.4 05/20/2021     Sodium   Date Value Ref Range Status   05/20/2021 135 (L) 136 - 145 mmol/L Final     Potassium   Date Value Ref Range Status   05/20/2021 4.0 3.5 - 5.1 mmol/L Final     Chloride   Date Value Ref Range Status   05/20/2021 103 95 - 110 mmol/L Final     CO2   Date Value Ref Range Status   05/20/2021 22 (L) 23 - 29 mmol/L Final     Glucose   Date Value Ref Range Status   05/20/2021 87 70 - 110 mg/dL Final     BUN   Date Value Ref Range Status   05/20/2021 16 6 - 20 mg/dL Final     Creatinine   Date Value Ref Range Status   05/20/2021 0.9 0.5 - 1.4 mg/dL Final     Calcium   Date Value Ref Range Status   05/20/2021 9.8 8.7 - 10.5 mg/dL Final     Total Protein   Date Value Ref Range Status   05/20/2021 8.5 (H) 6.0 - 8.4 g/dL Final     Albumin   Date Value Ref Range Status   05/20/2021 4.3 3.5 - 5.2 g/dL Final     Total Bilirubin   Date Value Ref Range Status   05/20/2021 0.4 0.1 - 1.0 mg/dL Final     Comment:     For infants and newborns, interpretation of results should be based  on gestational age, weight and in agreement with clinical  observations.    Premature Infant recommended reference ranges:  Up to 24 hours.............<8.0 mg/dL  Up to 48 hours............<12.0 mg/dL  3-5 days..................<15.0 mg/dL  6-29 days.................<15.0 mg/dL       Alkaline Phosphatase   Date Value Ref Range Status   05/20/2021 43 (L) 55 - 135 U/L Final     AST   Date Value Ref Range Status   05/20/2021 30 10 - 40 U/L Final     ALT   Date Value Ref Range Status   05/20/2021 56 (H) 10 - 44 U/L Final     Anion Gap   Date Value Ref Range Status   05/20/2021 10 8 - 16 mmol/L Final     eGFR if    Date Value Ref Range Status   05/20/2021 >60.0 >60 mL/min/1.73 m^2 Final     eGFR if non    Date Value Ref Range Status   05/20/2021 >60.0 >60 mL/min/1.73 m^2 Final     Comment:     Calculation used to obtain the estimated glomerular  filtration  rate (eGFR) is the CKD-EPI equation.        Lab Results   Component Value Date    HEPBSAG Negative 05/20/2021    HEPBSAB Negative 05/20/2021    HEPBCAB Negative 05/20/2021           MS Impression and Plan:     NEURO MULTIPLE SCLEROSIS IMPRESSION:   MS Status:     Number of relapses in the past year?:  1    Clinical Progression:  Improved    Clinical Progression comment:  RRMS    MRI Progression:  N/A    MRI Progression comment:  Due for monitoring MRI 2/2022, 6 months post DMT start  Plan:     DMT:  No change in management    DMT comment:  Ocrevus, infuses Mar/Sep  Pre-infusion labs ordered    Symptom Management:  No change in symptom management     The the patient was counseled about the importance of vitamin D supplementation in multiple sclerosis, and the fact that recent data suggests that high circulating blood levels of vitamin D has an ameliorating effect on the disease course in multiple sclerosis in general. Repeat level ordered    MS counseling given    F/u in 3/2022    Time spent on this encounter: 60 minutes. This includes face to face time and non-face to face time preparing to see the patient (eg, review of tests), obtaining and/or reviewing separately obtained history, documenting clinical information in the electronic or other health record, independently interpreting results and communicating results to the patient/family/caregiver, or care coordinator.  .    Problem List Items Addressed This Visit    None         Melissa Andre MD

## 2021-12-21 ENCOUNTER — PATIENT MESSAGE (OUTPATIENT)
Dept: NEUROLOGY | Facility: CLINIC | Age: 26
End: 2021-12-21
Payer: COMMERCIAL

## 2022-01-10 RX ORDER — DIAZEPAM 10 MG/1
TABLET ORAL
Qty: 1 TABLET | Refills: 0 | Status: SHIPPED | OUTPATIENT
Start: 2022-01-10 | End: 2022-09-13

## 2022-01-12 ENCOUNTER — PATIENT MESSAGE (OUTPATIENT)
Dept: NEUROLOGY | Facility: CLINIC | Age: 27
End: 2022-01-12
Payer: COMMERCIAL

## 2022-02-15 ENCOUNTER — HOSPITAL ENCOUNTER (OUTPATIENT)
Dept: RADIOLOGY | Facility: HOSPITAL | Age: 27
Discharge: HOME | End: 2022-02-15
Attending: PSYCHIATRY & NEUROLOGY
Payer: COMMERCIAL

## 2022-02-15 ENCOUNTER — LAB VISIT (OUTPATIENT)
Dept: LAB | Facility: HOSPITAL | Age: 27
End: 2022-02-15
Attending: PSYCHIATRY & NEUROLOGY
Payer: COMMERCIAL

## 2022-02-15 DIAGNOSIS — G35 MULTIPLE SCLEROSIS: ICD-10-CM

## 2022-02-15 LAB
ALBUMIN SERPL BCP-MCNC: 3.9 G/DL (ref 3.5–5.2)
ALP SERPL-CCNC: 41 U/L (ref 55–135)
ALT SERPL W/O P-5'-P-CCNC: 26 U/L (ref 10–44)
ANION GAP SERPL CALC-SCNC: 11 MMOL/L (ref 8–16)
AST SERPL-CCNC: 21 U/L (ref 10–40)
BASOPHILS # BLD AUTO: 0.04 K/UL (ref 0–0.2)
BASOPHILS NFR BLD: 0.3 % (ref 0–1.9)
BILIRUB SERPL-MCNC: 0.3 MG/DL (ref 0.1–1)
BUN SERPL-MCNC: 13 MG/DL (ref 6–20)
CALCIUM SERPL-MCNC: 9.8 MG/DL (ref 8.7–10.5)
CHLORIDE SERPL-SCNC: 102 MMOL/L (ref 95–110)
CO2 SERPL-SCNC: 25 MMOL/L (ref 23–29)
CREAT SERPL-MCNC: 0.8 MG/DL (ref 0.5–1.4)
DIFFERENTIAL METHOD: ABNORMAL
EOSINOPHIL # BLD AUTO: 0.2 K/UL (ref 0–0.5)
EOSINOPHIL NFR BLD: 2.1 % (ref 0–8)
ERYTHROCYTE [DISTWIDTH] IN BLOOD BY AUTOMATED COUNT: 12.8 % (ref 11.5–14.5)
EST. GFR  (AFRICAN AMERICAN): >60 ML/MIN/1.73 M^2
EST. GFR  (NON AFRICAN AMERICAN): >60 ML/MIN/1.73 M^2
GLUCOSE SERPL-MCNC: 103 MG/DL (ref 70–110)
HCT VFR BLD AUTO: 47.3 % (ref 40–54)
HGB BLD-MCNC: 15.1 G/DL (ref 14–18)
IGA SERPL-MCNC: 204 MG/DL (ref 40–350)
IGG SERPL-MCNC: 1232 MG/DL (ref 650–1600)
IGM SERPL-MCNC: 42 MG/DL (ref 50–300)
IMM GRANULOCYTES # BLD AUTO: 0.03 K/UL (ref 0–0.04)
IMM GRANULOCYTES NFR BLD AUTO: 0.3 % (ref 0–0.5)
LYMPHOCYTES # BLD AUTO: 1.9 K/UL (ref 1–4.8)
LYMPHOCYTES NFR BLD: 16.7 % (ref 18–48)
MCH RBC QN AUTO: 28.5 PG (ref 27–31)
MCHC RBC AUTO-ENTMCNC: 31.9 G/DL (ref 32–36)
MCV RBC AUTO: 89 FL (ref 82–98)
MONOCYTES # BLD AUTO: 0.9 K/UL (ref 0.3–1)
MONOCYTES NFR BLD: 7.8 % (ref 4–15)
NEUTROPHILS # BLD AUTO: 8.5 K/UL (ref 1.8–7.7)
NEUTROPHILS NFR BLD: 72.8 % (ref 38–73)
NRBC BLD-RTO: 0 /100 WBC
PLATELET # BLD AUTO: 271 K/UL (ref 150–450)
PMV BLD AUTO: 10.9 FL (ref 9.2–12.9)
POTASSIUM SERPL-SCNC: 4.6 MMOL/L (ref 3.5–5.1)
PROT SERPL-MCNC: 7.7 G/DL (ref 6–8.4)
RBC # BLD AUTO: 5.3 M/UL (ref 4.6–6.2)
SODIUM SERPL-SCNC: 138 MMOL/L (ref 136–145)
WBC # BLD AUTO: 11.62 K/UL (ref 3.9–12.7)

## 2022-02-15 PROCEDURE — 72156 MRI NECK SPINE W/O & W/DYE: CPT | Mod: 26,,, | Performed by: RADIOLOGY

## 2022-02-15 PROCEDURE — 80053 COMPREHEN METABOLIC PANEL: CPT | Performed by: PSYCHIATRY & NEUROLOGY

## 2022-02-15 PROCEDURE — 86706 HEP B SURFACE ANTIBODY: CPT | Performed by: PSYCHIATRY & NEUROLOGY

## 2022-02-15 PROCEDURE — 72156 MRI CERVICAL SPINE DEMYELINATING W W/O CONTRAST: ICD-10-PCS | Mod: 26,,, | Performed by: RADIOLOGY

## 2022-02-15 PROCEDURE — 85025 COMPLETE CBC W/AUTO DIFF WBC: CPT | Performed by: PSYCHIATRY & NEUROLOGY

## 2022-02-15 PROCEDURE — 87340 HEPATITIS B SURFACE AG IA: CPT | Performed by: PSYCHIATRY & NEUROLOGY

## 2022-02-15 PROCEDURE — 72156 MRI NECK SPINE W/O & W/DYE: CPT | Mod: TC

## 2022-02-15 PROCEDURE — 86704 HEP B CORE ANTIBODY TOTAL: CPT | Performed by: PSYCHIATRY & NEUROLOGY

## 2022-02-15 PROCEDURE — 36415 COLL VENOUS BLD VENIPUNCTURE: CPT | Mod: PO | Performed by: PSYCHIATRY & NEUROLOGY

## 2022-02-15 PROCEDURE — 25500020 PHARM REV CODE 255: Performed by: PSYCHIATRY & NEUROLOGY

## 2022-02-15 PROCEDURE — A9585 GADOBUTROL INJECTION: HCPCS | Performed by: PSYCHIATRY & NEUROLOGY

## 2022-02-15 PROCEDURE — 70553 MRI BRAIN STEM W/O & W/DYE: CPT | Mod: TC

## 2022-02-15 PROCEDURE — 82784 ASSAY IGA/IGD/IGG/IGM EACH: CPT | Performed by: PSYCHIATRY & NEUROLOGY

## 2022-02-15 PROCEDURE — 70553 MRI BRAIN STEM W/O & W/DYE: CPT | Mod: 26,,, | Performed by: RADIOLOGY

## 2022-02-15 PROCEDURE — 70553 MRI BRAIN DEMYELINATING W/ WO CONTRAST: ICD-10-PCS | Mod: 26,,, | Performed by: RADIOLOGY

## 2022-02-15 RX ORDER — GADOBUTROL 604.72 MG/ML
10 INJECTION INTRAVENOUS
Status: COMPLETED | OUTPATIENT
Start: 2022-02-15 | End: 2022-02-15

## 2022-02-15 RX ADMIN — GADOBUTROL 10 ML: 604.72 INJECTION INTRAVENOUS at 07:02

## 2022-02-17 LAB
HBV CORE AB SERPL QL IA: NEGATIVE
HBV SURFACE AG SERPL QL IA: NEGATIVE

## 2022-02-23 LAB — HBV SURFACE AB SER-ACNC: POSITIVE M[IU]/ML

## 2022-02-28 ENCOUNTER — PATIENT MESSAGE (OUTPATIENT)
Dept: PSYCHIATRY | Facility: CLINIC | Age: 27
End: 2022-02-28
Payer: COMMERCIAL

## 2022-03-01 ENCOUNTER — TELEPHONE (OUTPATIENT)
Dept: NEUROLOGY | Facility: CLINIC | Age: 27
End: 2022-03-01
Payer: COMMERCIAL

## 2022-03-05 ENCOUNTER — PATIENT OUTREACH (OUTPATIENT)
Dept: ADMINISTRATIVE | Facility: OTHER | Age: 27
End: 2022-03-05
Payer: COMMERCIAL

## 2022-03-06 NOTE — PROGRESS NOTES
Health Maintenance Due   Topic Date Due    COVID-19 Vaccine (1) Never done    Influenza Vaccine (1) Never done    Pneumococcal Vaccines (Age 0-64) (2 of 4 - PPSV23) 09/03/2021     Updates were requested from care everywhere.  Chart was reviewed for overdue Proactive [facility] Encounters ([facility]) topics (CRS, Breast Cancer Screening, Eye exam)  Health Maintenance has been updated.  [facility] immunization registry triggered.  Immunizations were reconciled.

## 2022-03-07 ENCOUNTER — OFFICE VISIT (OUTPATIENT)
Dept: NEUROLOGY | Facility: CLINIC | Age: 27
End: 2022-03-07
Payer: COMMERCIAL

## 2022-03-07 ENCOUNTER — TELEPHONE (OUTPATIENT)
Dept: INFUSION THERAPY | Facility: HOSPITAL | Age: 27
End: 2022-03-07
Payer: COMMERCIAL

## 2022-03-07 VITALS
HEART RATE: 89 BPM | DIASTOLIC BLOOD PRESSURE: 93 MMHG | SYSTOLIC BLOOD PRESSURE: 133 MMHG | BODY MASS INDEX: 43.23 KG/M2 | HEIGHT: 70 IN | WEIGHT: 302 LBS

## 2022-03-07 DIAGNOSIS — G35 MULTIPLE SCLEROSIS: Primary | ICD-10-CM

## 2022-03-07 DIAGNOSIS — M79.2 NEUROPATHIC PAIN: ICD-10-CM

## 2022-03-07 DIAGNOSIS — G47.33 OSA (OBSTRUCTIVE SLEEP APNEA): ICD-10-CM

## 2022-03-07 DIAGNOSIS — F41.9 ANXIETY: ICD-10-CM

## 2022-03-07 DIAGNOSIS — Z79.899 LONG TERM CURRENT USE OF IMMUNOSUPPRESSIVE DRUG: ICD-10-CM

## 2022-03-07 DIAGNOSIS — E55.9 VITAMIN D DEFICIENCY: ICD-10-CM

## 2022-03-07 PROCEDURE — 1159F MED LIST DOCD IN RCRD: CPT | Mod: CPTII,S$GLB,, | Performed by: PSYCHIATRY & NEUROLOGY

## 2022-03-07 PROCEDURE — 3008F BODY MASS INDEX DOCD: CPT | Mod: CPTII,S$GLB,, | Performed by: PSYCHIATRY & NEUROLOGY

## 2022-03-07 PROCEDURE — 3075F SYST BP GE 130 - 139MM HG: CPT | Mod: CPTII,S$GLB,, | Performed by: PSYCHIATRY & NEUROLOGY

## 2022-03-07 PROCEDURE — 3080F PR MOST RECENT DIASTOLIC BLOOD PRESSURE >= 90 MM HG: ICD-10-PCS | Mod: CPTII,S$GLB,, | Performed by: PSYCHIATRY & NEUROLOGY

## 2022-03-07 PROCEDURE — 99215 PR OFFICE/OUTPT VISIT, EST, LEVL V, 40-54 MIN: ICD-10-PCS | Mod: S$GLB,,, | Performed by: PSYCHIATRY & NEUROLOGY

## 2022-03-07 PROCEDURE — 3008F PR BODY MASS INDEX (BMI) DOCUMENTED: ICD-10-PCS | Mod: CPTII,S$GLB,, | Performed by: PSYCHIATRY & NEUROLOGY

## 2022-03-07 PROCEDURE — 99215 OFFICE O/P EST HI 40 MIN: CPT | Mod: S$GLB,,, | Performed by: PSYCHIATRY & NEUROLOGY

## 2022-03-07 PROCEDURE — 99999 PR PBB SHADOW E&M-EST. PATIENT-LVL III: ICD-10-PCS | Mod: PBBFAC,,, | Performed by: PSYCHIATRY & NEUROLOGY

## 2022-03-07 PROCEDURE — 1159F PR MEDICATION LIST DOCUMENTED IN MEDICAL RECORD: ICD-10-PCS | Mod: CPTII,S$GLB,, | Performed by: PSYCHIATRY & NEUROLOGY

## 2022-03-07 PROCEDURE — 3080F DIAST BP >= 90 MM HG: CPT | Mod: CPTII,S$GLB,, | Performed by: PSYCHIATRY & NEUROLOGY

## 2022-03-07 PROCEDURE — 3075F PR MOST RECENT SYSTOLIC BLOOD PRESS GE 130-139MM HG: ICD-10-PCS | Mod: CPTII,S$GLB,, | Performed by: PSYCHIATRY & NEUROLOGY

## 2022-03-07 PROCEDURE — 99999 PR PBB SHADOW E&M-EST. PATIENT-LVL III: CPT | Mod: PBBFAC,,, | Performed by: PSYCHIATRY & NEUROLOGY

## 2022-03-07 RX ORDER — PROPRANOLOL HYDROCHLORIDE 10 MG/1
10 TABLET ORAL 2 TIMES DAILY PRN
Qty: 60 TABLET | Refills: 1 | Status: SHIPPED | OUTPATIENT
Start: 2022-03-07 | End: 2022-09-13

## 2022-03-07 RX ORDER — GABAPENTIN 300 MG/1
600 CAPSULE ORAL 2 TIMES DAILY
Qty: 120 CAPSULE | Refills: 2 | Status: SHIPPED | OUTPATIENT
Start: 2022-03-07 | End: 2022-06-17 | Stop reason: SDUPTHER

## 2022-03-07 RX ORDER — ASPIRIN 325 MG
50000 TABLET, DELAYED RELEASE (ENTERIC COATED) ORAL
Qty: 12 CAPSULE | Refills: 1 | Status: SHIPPED | OUTPATIENT
Start: 2022-03-07 | End: 2022-09-13 | Stop reason: SDUPTHER

## 2022-03-07 NOTE — PROGRESS NOTES
Subjective:          Patient ID: Christiano Aguayo is a 26 y.o. male who presents today for a routine clinic visit for MS f/u prior to Ocrevus infusion.      MS HPI:  · DMT: ocrelizumab, initial infusions 9/15/21 and 9/29/21. Infuses Mar/Sep  · Side effects from DMT? No  · Taking vitamin D3 as recommended? Yes, 32728 units/weeks  · No new s/s of relapse or worsening  · Reviewed MRI's and recent labs.  · Hurting more at night and into the morning. Shocking sensation recurring down both arms, mostly on the left (originally happened). Has started/increase in last month.  · With further discussion, he has had significant stress recently, including working more than 100/week and his son has been sick amongst other stressors.  · Answered patient and mother's questions concerning disease management and goals of care.    Medications:  Current Outpatient Medications   Medication Sig    cholecalciferol, vitamin D3, 1,250 mcg (50,000 unit) capsule Take 1 capsule (50,000 Units total) by mouth every 7 days.    diazePAM (VALIUM) 10 MG Tab Take 1 tab po 1 hour prior to MRI. Can repeat once if needed.    fluticasone propionate (FLONASE) 50 mcg/actuation nasal spray 2 sprays (100 mcg total) by Each Nostril route once daily. (Patient not taking: No sig reported)    gabapentin (NEURONTIN) 300 MG capsule Take 1 capsule (300 mg total) by mouth 3 (three) times daily. (Patient not taking: No sig reported)    VITAMIN D2 1,250 mcg (50,000 unit) capsule TAKE ONE CAPSULE EVERY WEEK ( EVERY 7 DAYS ) *THANK YOU* (Patient not taking: Reported on 11/30/2021)     No current facility-administered medications for this visit.       SOCIAL HISTORY  Social History     Tobacco Use    Smoking status: Never Smoker    Smokeless tobacco: Current User     Types: Snuff   Substance Use Topics    Alcohol use: Yes     Comment: rarely    Drug use: Never   Living arrangements - the patient lives with his spouse    ROS:    REVIEW OF SYMPTOMS 3/7/2022   Do you  feel abnormally tired on most days? Yes   Do you feel you generally sleep well? No   Do you have difficulty controlling your bladder?  No   Do you have difficulty controlling your bowels?  No   Do you have frequent muscle cramps, tightness or spasms in your limbs?  No   Do you have new visual symptoms?  No   Do you have worsening difficulty with your memory or thinking? No   Do you have worsening symptoms of anxiety or depression?  No   For patients who walk, Do you have more difficulty walking?  No   Have you fallen since your last visit?  No   For patients who use wheelchairs: Do you have any skin wounds or breakdown? Not Applicable   Do you have difficulty using your hands?  No   Do you have shooting or burning pain? Yes   Do you have difficulty with sexual function?  No   If you are sexually active, are you using birth control? Y/N  N/A No   Do you often choke when swallowing liquids or solid food?  No   Do you experience worsening symptoms when overheated? Yes   Do you need any new equipment such as a wheelchair, walker or shower chair? No   Do you receive co-pay financial assistance for your principal MS medicine? Yes   Would you be interested in participating in an MS research trial in the future? Yes   For patients on Gilenya, Tecfidera, Aubagio, Rituxan, Ocrevus, Tysabri, Lemtrada or Methotrexate, are you aware that you should NOT receive live virus vaccines?  Yes   Do you feel you have adequate family/friend support?  Yes   Do you have health insurance?   Yes   Are you currently employed? Yes   Do you receive SSDI/SSI?  Not Applicable   Do you use marijuana or cannabis products? No   Have you been diagnosed with a urinary tract infection since your last visit here? No   Have you been diagnosed with a respiratory tract infection since your last visit here? No   Have you been to the emergency room since your last visit here? No   Have you been hospitalized since your last visit here?  No                 Objective:        1. 25 foot timed walk:  Timed 25 Foot Walk: 5/20/2021 3/7/2022   Did patient wear an AFO? No No   Was assistive device used? No No   Time for 25 Foot Walk (seconds) 4.35 3.45   Time for 25 Foot Walk (seconds) 4.32 3.25       2. 9 Hole Peg Test:  No flowsheet data found.    Neurologic Exam  MENTAL STATUS: grossly intact. Normal language, attention, and memory.   CRANIAL NERVE EXAM: Extraocular muscles are intact.  No facial asymmetry. tongue midline. Shoulder shrug normal b/l There is no dysarthria.   MOTOR EXAM: Normal bulk and tone throughout UE and LE bilaterally. Rapid sequential movements are normal. Strength is 5/5 t/o except 4+ RHF and 4+/5- b/l ADF's.   SENSORY EXAM: Normal to LT t/o  COORDINATION: Normal finger-to-nose exam.   GAIT: Narrow based, foot slap gait on right. Near fall during 25ft walk test.      Imaging:     No results found for this or any previous visit.    No results found for this or any previous visit.    No results found for this or any previous visit.    Results for orders placed during the hospital encounter of 02/15/22    MRI Brain Demyelinating W W/O Contrast    Impression  1. Stable appearance of the brain with few foci of FLAIR and T2 hyperintense signal in the cerebral white matter is well as the brainstem.  There is a provided history of multiple sclerosis.  These findings would be consistent with epic provided diagnosis.  There are no new regions of signal abnormality in the brain.  There are no regions of restricted diffusion or abnormal enhancement to suggest interval progression of disease or active demyelination.  2. 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      Electronically signed by: Hossein Reyes MD  Date:    02/15/2022  Time:    07:40    Results for orders placed during  the hospital encounter of 02/15/22    MRI Cervical Spine Demyelinating W W/O Contrast    Impression  1. Patient motion does limit evaluation.  There are regions of abnormal signal intensity in the cord.  These were present previously.  A lesion in the posterior cord at the level of C1-2 through superior C3 was present previously but appears slightly larger on the current study.  This may reflect mild interval progression of disease but there is no abnormal enhancement to suggest active demyelination.  Similarly, a lesion previously seen in the left posterolateral cord at the level of C4 on C5 is without definite change.  Smaller lesions at the level of C3 or likely present on the most recent prior study but patient motion does limit evaluation.  2. There is no spinal stenosis or cord compression but foraminal narrowing at several levels due mostly due to uncovertebral spurring is without change and described above.      Electronically signed by: Hossein Reyes MD  Date:    02/15/2022  Time:    07:50    Results for orders placed during the hospital encounter of 05/28/21    MRI Thoracic Spine Demyelinating W W/O Contrast    Impression  1. There is a similar appearance of the thoracic spine and cord when compared to the prior study.  The images are degraded by patient body habitus and motion.  There is no fracture or malalignment.  The spinal canal is somewhat small on a developmental basis and there is prominent dorsal epidural fat (epidural lipomatosis).  These findings are unchanged.  There is no significant spinal canal or foraminal stenosis.  2. There is suggestion of abnormal T2 hyperintense signal in the left lateral cord at the level of T12.  This is better demonstrated and is more conspicuous on the comparison study.  There is no other definite region of signal abnormality in the thoracic cord.  There is no abnormal enhancement.      Electronically signed by: Hossein Reyes  MD  Date:    05/28/2021  Time:    08:50        Labs:     Lab Results   Component Value Date    LITUEAIY15LR 16 (L) 04/19/2021     Lab Results   Component Value Date    JCVINDEX 0.20 (H) 05/20/2021    JCVANTIBODY INDETERMINATE (A) 05/20/2021     Lab Results   Component Value Date    MH8ZWLID 71.2 05/20/2021    ABSOLUTECD3 2308 (H) 05/20/2021    PZ5JOHBT 16.0 05/20/2021    ABSOLUTECD8 519 05/20/2021    DT4DCWOB 50.7 05/20/2021    ABSOLUTECD4 1643 (H) 05/20/2021    LABCD48 3.16 05/20/2021     Lab Results   Component Value Date    WBC 11.62 02/15/2022    RBC 5.30 02/15/2022    HGB 15.1 02/15/2022    HCT 47.3 02/15/2022    MCV 89 02/15/2022    MCH 28.5 02/15/2022    MCHC 31.9 (L) 02/15/2022    RDW 12.8 02/15/2022     02/15/2022    MPV 10.9 02/15/2022    GRAN 8.5 (H) 02/15/2022    GRAN 72.8 02/15/2022    LYMPH 1.9 02/15/2022    LYMPH 16.7 (L) 02/15/2022    MONO 0.9 02/15/2022    MONO 7.8 02/15/2022    EOS 0.2 02/15/2022    BASO 0.04 02/15/2022    EOSINOPHIL 2.1 02/15/2022    BASOPHIL 0.3 02/15/2022     Sodium   Date Value Ref Range Status   02/15/2022 138 136 - 145 mmol/L Final     Potassium   Date Value Ref Range Status   02/15/2022 4.6 3.5 - 5.1 mmol/L Final     Chloride   Date Value Ref Range Status   02/15/2022 102 95 - 110 mmol/L Final     CO2   Date Value Ref Range Status   02/15/2022 25 23 - 29 mmol/L Final     Glucose   Date Value Ref Range Status   02/15/2022 103 70 - 110 mg/dL Final     BUN   Date Value Ref Range Status   02/15/2022 13 6 - 20 mg/dL Final     Creatinine   Date Value Ref Range Status   02/15/2022 0.8 0.5 - 1.4 mg/dL Final     Calcium   Date Value Ref Range Status   02/15/2022 9.8 8.7 - 10.5 mg/dL Final     Total Protein   Date Value Ref Range Status   02/15/2022 7.7 6.0 - 8.4 g/dL Final     Albumin   Date Value Ref Range Status   02/15/2022 3.9 3.5 - 5.2 g/dL Final     Total Bilirubin   Date Value Ref Range Status   02/15/2022 0.3 0.1 - 1.0 mg/dL Final     Comment:     For infants and  newborns, interpretation of results should be based  on gestational age, weight and in agreement with clinical  observations.    Premature Infant recommended reference ranges:  Up to 24 hours.............<8.0 mg/dL  Up to 48 hours............<12.0 mg/dL  3-5 days..................<15.0 mg/dL  6-29 days.................<15.0 mg/dL       Alkaline Phosphatase   Date Value Ref Range Status   02/15/2022 41 (L) 55 - 135 U/L Final     AST   Date Value Ref Range Status   02/15/2022 21 10 - 40 U/L Final     ALT   Date Value Ref Range Status   02/15/2022 26 10 - 44 U/L Final     Anion Gap   Date Value Ref Range Status   02/15/2022 11 8 - 16 mmol/L Final     eGFR if    Date Value Ref Range Status   02/15/2022 >60.0 >60 mL/min/1.73 m^2 Final     eGFR if non    Date Value Ref Range Status   02/15/2022 >60.0 >60 mL/min/1.73 m^2 Final     Comment:     Calculation used to obtain the estimated glomerular filtration  rate (eGFR) is the CKD-EPI equation.        Lab Results   Component Value Date    HEPBSAG Negative 02/15/2022    HEPBSAB Positive 02/15/2022    HEPBCAB Negative 02/15/2022           MS Impression and Plan:     NEURO MULTIPLE SCLEROSIS IMPRESSION:   MS Status:     Number of relapses in the past year?:  1    Number of relapses in the past year? comment:  Last relapse 5/2021    Clinical Progression:  Clinically Stable    Clinical Progression comment:  RRMS type    MRI Progression:  Stable    MRI Progression comment:  MRI brain and c-spine stable 2/2022  Plan:     DMT:  No change in management    DMT comment:  Continue Ocrevus, due for next infusion this month.  Pre-infusion screening labs reviewed and unremarkable.    Symptom Management:  No change in symptom management     Next Imaging Due: 2/7/2023     Next Labs Due: 8/7/2022     Extensive counseling given for pseudorelapse symptoms and need to decrease stressors that are within his control.  For anxiety, will trial propranolol 10mg bid  prn. PARQ discussion held. Discussed cymbalta for pain and anxiety, but multiple family members with poor response to this drug makes him hesitant to try this.  Neuropathic pain: discussed trying gabapentin as 600mg bid vs 300 tid for better relief. PARQ discussion held.  CORINA: non-compliant with mask due to discomfort. Old mask. Will refer to sleep medicine.    F/u in 2 months        Time spent on this encounter: 80 minutes. This includes face to face time and non-face to face time preparing to see the patient (eg, review of tests), obtaining and/or reviewing separately obtained history, documenting clinical information in the electronic or other health record, independently interpreting results and communicating results to the patient/family/caregiver, or care coordinator.      Problem List Items Addressed This Visit        Neuro    Multiple sclerosis - Primary      Other Visit Diagnoses     CORINA (obstructive sleep apnea)        Relevant Orders    Ambulatory referral/consult to Sleep Disorders    Anxiety        Relevant Medications    propranoloL (INDERAL) 10 MG tablet          Melissa Andre MD

## 2022-03-07 NOTE — TELEPHONE ENCOUNTER
[3:32 PM] Gabbie Cai   can you schedule MRN [id number], Christiano Aguayo, for his maintenance Ocrevus on either 3/16 or 3/23? Auth is approved, and he's actually here with me now so I can let him know.    [3:33 PM] Eileen Torresaleksandar  mrn [id number] 03/16/22 @9:30

## 2022-03-16 ENCOUNTER — INFUSION (OUTPATIENT)
Dept: INFUSION THERAPY | Facility: HOSPITAL | Age: 27
End: 2022-03-16
Attending: PSYCHIATRY & NEUROLOGY
Payer: COMMERCIAL

## 2022-03-16 VITALS
BODY MASS INDEX: 43.33 KG/M2 | RESPIRATION RATE: 16 BRPM | HEART RATE: 82 BPM | WEIGHT: 302 LBS | TEMPERATURE: 99 F | SYSTOLIC BLOOD PRESSURE: 112 MMHG | DIASTOLIC BLOOD PRESSURE: 68 MMHG

## 2022-03-16 DIAGNOSIS — G35 MULTIPLE SCLEROSIS: Primary | ICD-10-CM

## 2022-03-16 PROCEDURE — 63600175 PHARM REV CODE 636 W HCPCS: Mod: JG,PN | Performed by: PSYCHIATRY & NEUROLOGY

## 2022-03-16 PROCEDURE — 25000003 PHARM REV CODE 250: Mod: PN | Performed by: PSYCHIATRY & NEUROLOGY

## 2022-03-16 PROCEDURE — 96375 TX/PRO/DX INJ NEW DRUG ADDON: CPT | Mod: PN

## 2022-03-16 PROCEDURE — 96365 THER/PROPH/DIAG IV INF INIT: CPT | Mod: PN

## 2022-03-16 PROCEDURE — 96366 THER/PROPH/DIAG IV INF ADDON: CPT | Mod: PN

## 2022-03-16 RX ORDER — EPINEPHRINE 0.3 MG/.3ML
0.3 INJECTION SUBCUTANEOUS
Status: DISCONTINUED | OUTPATIENT
Start: 2022-03-16 | End: 2022-03-16 | Stop reason: HOSPADM

## 2022-03-16 RX ORDER — METHYLPREDNISOLONE SOD SUCC 125 MG
100 VIAL (EA) INJECTION
Status: CANCELLED
Start: 2022-08-17

## 2022-03-16 RX ORDER — EPINEPHRINE 0.3 MG/.3ML
0.3 INJECTION SUBCUTANEOUS
Status: CANCELLED | OUTPATIENT
Start: 2022-08-17

## 2022-03-16 RX ORDER — DIPHENHYDRAMINE HYDROCHLORIDE 50 MG/ML
50 INJECTION INTRAMUSCULAR; INTRAVENOUS
Status: DISCONTINUED | OUTPATIENT
Start: 2022-03-16 | End: 2022-03-16 | Stop reason: HOSPADM

## 2022-03-16 RX ORDER — FAMOTIDINE 10 MG/ML
20 INJECTION INTRAVENOUS
Status: COMPLETED | OUTPATIENT
Start: 2022-03-16 | End: 2022-03-16

## 2022-03-16 RX ORDER — DIPHENHYDRAMINE HYDROCHLORIDE 50 MG/ML
50 INJECTION INTRAMUSCULAR; INTRAVENOUS
Status: CANCELLED | OUTPATIENT
Start: 2022-08-17

## 2022-03-16 RX ORDER — SODIUM CHLORIDE 0.9 % (FLUSH) 0.9 %
10 SYRINGE (ML) INJECTION
Status: CANCELLED | OUTPATIENT
Start: 2022-08-17

## 2022-03-16 RX ORDER — HEPARIN 100 UNIT/ML
500 SYRINGE INTRAVENOUS
Status: CANCELLED | OUTPATIENT
Start: 2022-08-17

## 2022-03-16 RX ORDER — FAMOTIDINE 10 MG/ML
20 INJECTION INTRAVENOUS
Status: CANCELLED | OUTPATIENT
Start: 2022-08-17

## 2022-03-16 RX ORDER — SODIUM CHLORIDE 0.9 % (FLUSH) 0.9 %
10 SYRINGE (ML) INJECTION
Status: DISCONTINUED | OUTPATIENT
Start: 2022-03-16 | End: 2022-03-16 | Stop reason: HOSPADM

## 2022-03-16 RX ORDER — ACETAMINOPHEN 500 MG
1000 TABLET ORAL
Status: COMPLETED | OUTPATIENT
Start: 2022-03-16 | End: 2022-03-16

## 2022-03-16 RX ORDER — ACETAMINOPHEN 500 MG
1000 TABLET ORAL
Status: CANCELLED | OUTPATIENT
Start: 2022-08-17

## 2022-03-16 RX ORDER — DIPHENHYDRAMINE HCL 50 MG
50 CAPSULE ORAL
Status: COMPLETED | OUTPATIENT
Start: 2022-03-16 | End: 2022-03-16

## 2022-03-16 RX ORDER — METHYLPREDNISOLONE SOD SUCC 125 MG
100 VIAL (EA) INJECTION
Status: COMPLETED | OUTPATIENT
Start: 2022-03-16 | End: 2022-03-16

## 2022-03-16 RX ADMIN — FAMOTIDINE 20 MG: 10 INJECTION INTRAVENOUS at 09:03

## 2022-03-16 RX ADMIN — DIPHENHYDRAMINE HYDROCHLORIDE 50 MG: 50 CAPSULE ORAL at 09:03

## 2022-03-16 RX ADMIN — SODIUM CHLORIDE: 9 INJECTION, SOLUTION INTRAVENOUS at 09:03

## 2022-03-16 RX ADMIN — METHYLPREDNISOLONE SODIUM SUCCINATE 100 MG: 125 INJECTION, POWDER, FOR SOLUTION INTRAMUSCULAR; INTRAVENOUS at 09:03

## 2022-03-16 RX ADMIN — OCRELIZUMAB 600 MG: 300 INJECTION INTRAVENOUS at 09:03

## 2022-03-16 RX ADMIN — ACETAMINOPHEN 1000 MG: 500 TABLET, FILM COATED ORAL at 09:03

## 2022-03-21 ENCOUNTER — TELEPHONE (OUTPATIENT)
Dept: NEUROLOGY | Facility: CLINIC | Age: 27
End: 2022-03-21
Payer: COMMERCIAL

## 2022-03-21 NOTE — TELEPHONE ENCOUNTER
----- Message from Xu Sahni sent at 3/21/2022 11:08 AM CDT -----  Contact: [phone]  Jeremy with [facility] calling in regards to some information needed on the pt. Kareem would like a call back.    [phone]

## 2022-03-29 ENCOUNTER — TELEPHONE (OUTPATIENT)
Dept: SLEEP MEDICINE | Facility: CLINIC | Age: 27
End: 2022-03-29
Payer: COMMERCIAL

## 2022-03-29 NOTE — TELEPHONE ENCOUNTER
Staff reached out to the pt and he did not answer, so I left a message informing him that Brinda will not be in clinic on 04/20/2022

## 2022-04-14 ENCOUNTER — TELEPHONE (OUTPATIENT)
Dept: SLEEP MEDICINE | Facility: CLINIC | Age: 27
End: 2022-04-14
Payer: COMMERCIAL

## 2022-04-14 NOTE — TELEPHONE ENCOUNTER
Staff reached out to the pt and he did not answer, so I left a message informing him that Brinda will not be in clinic on 20th. So we are going to have to reschedule his appt

## 2022-04-19 ENCOUNTER — PATIENT MESSAGE (OUTPATIENT)
Dept: SLEEP MEDICINE | Facility: CLINIC | Age: 27
End: 2022-04-19
Payer: COMMERCIAL

## 2022-05-23 ENCOUNTER — PATIENT MESSAGE (OUTPATIENT)
Dept: ADMINISTRATIVE | Facility: OTHER | Age: 27
End: 2022-05-23
Payer: COMMERCIAL

## 2022-05-23 ENCOUNTER — PATIENT OUTREACH (OUTPATIENT)
Dept: ADMINISTRATIVE | Facility: OTHER | Age: 27
End: 2022-05-23
Payer: COMMERCIAL

## 2022-05-23 NOTE — PROGRESS NOTES
Care Everywhere: updated  Immunization: updated  Health Maintenance: updated  Media Review:   Legacy Review:   DIS:  Order placed:   Upcoming appts:  EFAX:  Task Tickets:Scheduling ticket to schedule annual pcp visit sent to patient's portal   Referrals:

## 2022-05-31 ENCOUNTER — PATIENT MESSAGE (OUTPATIENT)
Dept: ADMINISTRATIVE | Facility: HOSPITAL | Age: 27
End: 2022-05-31
Payer: COMMERCIAL

## 2022-06-24 ENCOUNTER — PATIENT MESSAGE (OUTPATIENT)
Dept: PSYCHIATRY | Facility: CLINIC | Age: 27
End: 2022-06-24
Payer: COMMERCIAL

## 2022-07-18 ENCOUNTER — PATIENT MESSAGE (OUTPATIENT)
Dept: NEUROLOGY | Facility: CLINIC | Age: 27
End: 2022-07-18
Payer: COMMERCIAL

## 2022-08-11 DIAGNOSIS — G35 MULTIPLE SCLEROSIS: Primary | ICD-10-CM

## 2022-08-25 DIAGNOSIS — G35 MULTIPLE SCLEROSIS: Primary | ICD-10-CM

## 2022-09-01 ENCOUNTER — LAB VISIT (OUTPATIENT)
Dept: LAB | Facility: HOSPITAL | Age: 27
End: 2022-09-01
Attending: CLINICAL NURSE SPECIALIST
Payer: COMMERCIAL

## 2022-09-01 DIAGNOSIS — G35 MULTIPLE SCLEROSIS: ICD-10-CM

## 2022-09-01 LAB
ALBUMIN SERPL BCP-MCNC: 4 G/DL (ref 3.5–5.2)
ALP SERPL-CCNC: 37 U/L (ref 55–135)
ALT SERPL W/O P-5'-P-CCNC: 38 U/L (ref 10–44)
ANION GAP SERPL CALC-SCNC: 8 MMOL/L (ref 8–16)
AST SERPL-CCNC: 34 U/L (ref 10–40)
BASOPHILS # BLD AUTO: 0.04 K/UL (ref 0–0.2)
BASOPHILS NFR BLD: 0.5 % (ref 0–1.9)
BILIRUB SERPL-MCNC: 0.4 MG/DL (ref 0.1–1)
BUN SERPL-MCNC: 13 MG/DL (ref 6–20)
CALCIUM SERPL-MCNC: 9.6 MG/DL (ref 8.7–10.5)
CHLORIDE SERPL-SCNC: 105 MMOL/L (ref 95–110)
CO2 SERPL-SCNC: 27 MMOL/L (ref 23–29)
CREAT SERPL-MCNC: 0.9 MG/DL (ref 0.5–1.4)
DIFFERENTIAL METHOD: NORMAL
EOSINOPHIL # BLD AUTO: 0.2 K/UL (ref 0–0.5)
EOSINOPHIL NFR BLD: 2.4 % (ref 0–8)
ERYTHROCYTE [DISTWIDTH] IN BLOOD BY AUTOMATED COUNT: 13.2 % (ref 11.5–14.5)
EST. GFR  (NO RACE VARIABLE): >60 ML/MIN/1.73 M^2
GLUCOSE SERPL-MCNC: 127 MG/DL (ref 70–110)
HBV CORE AB SERPL QL IA: NORMAL
HBV SURFACE AB SER-ACNC: 91.66 MIU/ML
HBV SURFACE AB SER-ACNC: REACTIVE M[IU]/ML
HCT VFR BLD AUTO: 44.2 % (ref 40–54)
HGB BLD-MCNC: 14.3 G/DL (ref 14–18)
IGA SERPL-MCNC: 179 MG/DL (ref 40–350)
IGG SERPL-MCNC: 1148 MG/DL (ref 650–1600)
IGM SERPL-MCNC: 28 MG/DL (ref 50–300)
IMM GRANULOCYTES # BLD AUTO: 0.03 K/UL (ref 0–0.04)
IMM GRANULOCYTES NFR BLD AUTO: 0.4 % (ref 0–0.5)
LYMPHOCYTES # BLD AUTO: 1.6 K/UL (ref 1–4.8)
LYMPHOCYTES NFR BLD: 19.5 % (ref 18–48)
MCH RBC QN AUTO: 28.7 PG (ref 27–31)
MCHC RBC AUTO-ENTMCNC: 32.4 G/DL (ref 32–36)
MCV RBC AUTO: 89 FL (ref 82–98)
MONOCYTES # BLD AUTO: 0.6 K/UL (ref 0.3–1)
MONOCYTES NFR BLD: 6.8 % (ref 4–15)
NEUTROPHILS # BLD AUTO: 5.9 K/UL (ref 1.8–7.7)
NEUTROPHILS NFR BLD: 70.4 % (ref 38–73)
NRBC BLD-RTO: 0 /100 WBC
PLATELET # BLD AUTO: 265 K/UL (ref 150–450)
PMV BLD AUTO: 11 FL (ref 9.2–12.9)
POTASSIUM SERPL-SCNC: 4.3 MMOL/L (ref 3.5–5.1)
PROT SERPL-MCNC: 7.4 G/DL (ref 6–8.4)
RBC # BLD AUTO: 4.98 M/UL (ref 4.6–6.2)
SODIUM SERPL-SCNC: 140 MMOL/L (ref 136–145)
WBC # BLD AUTO: 8.4 K/UL (ref 3.9–12.7)

## 2022-09-01 PROCEDURE — 36415 COLL VENOUS BLD VENIPUNCTURE: CPT | Mod: PO | Performed by: CLINICAL NURSE SPECIALIST

## 2022-09-01 PROCEDURE — 87340 HEPATITIS B SURFACE AG IA: CPT | Performed by: CLINICAL NURSE SPECIALIST

## 2022-09-01 PROCEDURE — 85025 COMPLETE CBC W/AUTO DIFF WBC: CPT | Performed by: CLINICAL NURSE SPECIALIST

## 2022-09-01 PROCEDURE — 86706 HEP B SURFACE ANTIBODY: CPT | Performed by: CLINICAL NURSE SPECIALIST

## 2022-09-01 PROCEDURE — 86704 HEP B CORE ANTIBODY TOTAL: CPT | Performed by: CLINICAL NURSE SPECIALIST

## 2022-09-01 PROCEDURE — 82784 ASSAY IGA/IGD/IGG/IGM EACH: CPT | Mod: 59 | Performed by: CLINICAL NURSE SPECIALIST

## 2022-09-01 PROCEDURE — 80053 COMPREHEN METABOLIC PANEL: CPT | Performed by: CLINICAL NURSE SPECIALIST

## 2022-09-01 RX ORDER — OCRELIZUMAB 300 MG/10ML
INJECTION INTRAVENOUS
Qty: 20 ML | Refills: 1 | OUTPATIENT
Start: 2022-09-01

## 2022-09-02 LAB — HBV SURFACE AG SERPL QL IA: NORMAL

## 2022-09-07 RX ORDER — OCRELIZUMAB 300 MG/10ML
INJECTION INTRAVENOUS
Qty: 20 ML | Refills: 1 | Status: SHIPPED | OUTPATIENT
Start: 2022-09-07

## 2022-09-13 ENCOUNTER — OFFICE VISIT (OUTPATIENT)
Dept: NEUROLOGY | Facility: CLINIC | Age: 27
End: 2022-09-13
Payer: COMMERCIAL

## 2022-09-13 VITALS
SYSTOLIC BLOOD PRESSURE: 120 MMHG | HEART RATE: 80 BPM | DIASTOLIC BLOOD PRESSURE: 77 MMHG | BODY MASS INDEX: 42.35 KG/M2 | WEIGHT: 295.81 LBS | HEIGHT: 70 IN

## 2022-09-13 DIAGNOSIS — R39.11 URINARY HESITANCY: ICD-10-CM

## 2022-09-13 DIAGNOSIS — Z71.89 COUNSELING REGARDING GOALS OF CARE: ICD-10-CM

## 2022-09-13 DIAGNOSIS — Z76.89 ENCOUNTER TO ESTABLISH CARE: ICD-10-CM

## 2022-09-13 DIAGNOSIS — G35 MULTIPLE SCLEROSIS: Primary | ICD-10-CM

## 2022-09-13 DIAGNOSIS — M79.2 NEUROPATHIC PAIN: ICD-10-CM

## 2022-09-13 DIAGNOSIS — E55.9 VITAMIN D DEFICIENCY: ICD-10-CM

## 2022-09-13 DIAGNOSIS — Z29.89 PROPHYLACTIC IMMUNOTHERAPY: ICD-10-CM

## 2022-09-13 DIAGNOSIS — Z79.899 HIGH RISK MEDICATION USE: ICD-10-CM

## 2022-09-13 PROCEDURE — 1159F MED LIST DOCD IN RCRD: CPT | Mod: CPTII,S$GLB,, | Performed by: CLINICAL NURSE SPECIALIST

## 2022-09-13 PROCEDURE — 99999 PR PBB SHADOW E&M-EST. PATIENT-LVL III: CPT | Mod: PBBFAC,,, | Performed by: CLINICAL NURSE SPECIALIST

## 2022-09-13 PROCEDURE — 1159F PR MEDICATION LIST DOCUMENTED IN MEDICAL RECORD: ICD-10-PCS | Mod: CPTII,S$GLB,, | Performed by: CLINICAL NURSE SPECIALIST

## 2022-09-13 PROCEDURE — 3008F BODY MASS INDEX DOCD: CPT | Mod: CPTII,S$GLB,, | Performed by: CLINICAL NURSE SPECIALIST

## 2022-09-13 PROCEDURE — 99999 PR PBB SHADOW E&M-EST. PATIENT-LVL III: ICD-10-PCS | Mod: PBBFAC,,, | Performed by: CLINICAL NURSE SPECIALIST

## 2022-09-13 PROCEDURE — 3008F PR BODY MASS INDEX (BMI) DOCUMENTED: ICD-10-PCS | Mod: CPTII,S$GLB,, | Performed by: CLINICAL NURSE SPECIALIST

## 2022-09-13 PROCEDURE — 3078F PR MOST RECENT DIASTOLIC BLOOD PRESSURE < 80 MM HG: ICD-10-PCS | Mod: CPTII,S$GLB,, | Performed by: CLINICAL NURSE SPECIALIST

## 2022-09-13 PROCEDURE — 3078F DIAST BP <80 MM HG: CPT | Mod: CPTII,S$GLB,, | Performed by: CLINICAL NURSE SPECIALIST

## 2022-09-13 PROCEDURE — 3074F PR MOST RECENT SYSTOLIC BLOOD PRESSURE < 130 MM HG: ICD-10-PCS | Mod: CPTII,S$GLB,, | Performed by: CLINICAL NURSE SPECIALIST

## 2022-09-13 PROCEDURE — 99215 OFFICE O/P EST HI 40 MIN: CPT | Mod: S$GLB,,, | Performed by: CLINICAL NURSE SPECIALIST

## 2022-09-13 PROCEDURE — 99215 PR OFFICE/OUTPT VISIT, EST, LEVL V, 40-54 MIN: ICD-10-PCS | Mod: S$GLB,,, | Performed by: CLINICAL NURSE SPECIALIST

## 2022-09-13 PROCEDURE — 3074F SYST BP LT 130 MM HG: CPT | Mod: CPTII,S$GLB,, | Performed by: CLINICAL NURSE SPECIALIST

## 2022-09-13 RX ORDER — GABAPENTIN 300 MG/1
CAPSULE ORAL
Qty: 120 CAPSULE | Refills: 2 | Status: SHIPPED | OUTPATIENT
Start: 2022-09-13 | End: 2022-12-01

## 2022-09-13 RX ORDER — ASPIRIN 325 MG
50000 TABLET, DELAYED RELEASE (ENTERIC COATED) ORAL
Qty: 12 CAPSULE | Refills: 1 | Status: SHIPPED | OUTPATIENT
Start: 2022-09-13 | End: 2022-12-27

## 2022-09-13 NOTE — PROGRESS NOTES
Subjective:          Patient ID: Christiano Aguayo is a 27 y.o. male who presents today for a routine clinic visit for MS.  He was last seen in March by Dr. Andre. He is accompanied by his wife, Becky.     MS HPI:  DMT: ocrelizumab last infused 3/16.; due now  Side effects from DMT? No  Taking vitamin D3 as recommended? Yes -  Dose: 50,000 units once a week   He did not take propranolol. He reports that his anxiety is ok.   He is taking gabapentin 900mg at night, and this helps him to sleep and controls nerve pain in his arms. He is not taking it during the day. Pain is manageable during the day. He has had carpal tunnel surgery, which was not helpful.   He thinks he may have a UTI right now. He is not able to empty as easily, and there is an odor to his urine.   He sometimes has erectile dysfunction while taking gabapentin.   He denies any vision issues.   He has an active job.   He is very heat sensitive. He is interested in a cooling vest. He is trying to move up to a new position at work that will allow him to limit time in the heat.   He has been taking stool softeners because he feels constipated on gabapentin. Stool softeners seem to help. He drinks 4-6 bottles of water daily.     Medications:  Current Outpatient Medications   Medication Sig    azithromycin (Z-BARBARA) 250 MG tablet TAKE 2 TABLETS NOW THEN 1 TABLET DAILY FOR 4 DAYS *THANK YOU*    cholecalciferol, vitamin D3, 1,250 mcg (50,000 unit) capsule Take 1 capsule (50,000 Units total) by mouth every 7 days.    diazePAM (VALIUM) 10 MG Tab Take 1 tab po 1 hour prior to MRI. Can repeat once if needed.    fluticasone propionate (FLONASE) 50 mcg/actuation nasal spray 2 sprays (100 mcg total) by Each Nostril route once daily. (Patient not taking: No sig reported)    gabapentin (NEURONTIN) 300 MG capsule TAKE 2 CAPSULES TWICE DAILY FOR NERVE PAIN *THANK YOU*    ocrelizumab (OCREVUS) 30 mg/mL Soln Infuse Ocrevus 600mg in 500mL of 0.9% NaCl IV every 24 weeks. Pre-meds:  Solumedrol 100mg IVPB, Benadryl 50mg IVP, Tylenol 1000mg PO, Pepcid 20mg IVP.    propranoloL (INDERAL) 10 MG tablet Take 1 tablet (10 mg total) by mouth 2 (two) times daily as needed (anxiety).       SOCIAL HISTORY  Social History     Tobacco Use    Smoking status: Never    Smokeless tobacco: Current     Types: Snuff   Substance Use Topics    Alcohol use: Yes     Comment: rarely    Drug use: Never     Living arrangements - the patient lives with their family.    ROS:  REVIEW OF SYMPTOMS 9/13/2022   Do you feel abnormally tired on most days? No   Do you feel you generally sleep well? Yes   Do you have difficulty controlling your bladder?  Yes   Do you have difficulty controlling your bowels?  Yes   Do you have frequent muscle cramps, tightness or spasms in your limbs?  No   Do you have new visual symptoms?  No   Do you have worsening difficulty with your memory or thinking? No   Do you have worsening symptoms of anxiety or depression?  No   For patients who walk, Do you have more difficulty walking?  No   Have you fallen since your last visit?  No   For patients who use wheelchairs: Do you have any skin wounds or breakdown? Not Applicable   Do you have difficulty using your hands?  No   Do you have shooting or burning pain? Yes   Do you have difficulty with sexual function?  Yes   If you are sexually active, are you using birth control? Y/N  N/A No   Do you often choke when swallowing liquids or solid food?  No   Do you experience worsening symptoms when overheated? Yes   Do you need any new equipment such as a wheelchair, walker or shower chair? No   Do you receive co-pay financial assistance for your principal MS medicine? Yes   Would you be interested in participating in an MS research trial in the future? No   For patients on Gilenya, Tecfidera, Aubagio, Rituxan, Ocrevus, Tysabri, Lemtrada or Methotrexate, are you aware that you should NOT receive live virus vaccines?  Yes   Do you feel you have adequate  family/friend support?  Yes   Do you have health insurance?   Yes   Are you currently employed? Yes   Do you receive SSDI/SSI?  Not Applicable   Do you use marijuana or cannabis products? No   Have you been diagnosed with a urinary tract infection since your last visit here? No   Have you been diagnosed with a respiratory tract infection since your last visit here? No   Have you been to the emergency room since your last visit here? No   Have you been hospitalized since your last visit here?  No              Objective:        1. 25 foot timed walk:  Timed 25 Foot Walk: 3/7/2022 9/13/2022   Did patient wear an AFO? No No   Was assistive device used? No No   Time for 25 Foot Walk (seconds) 3.45 3.4   Time for 25 Foot Walk (seconds) 3.25 3.3       Neurologic Exam     Mental Status   Oriented to person, place, and time.   Attention: normal.   Speech: speech is normal   Level of consciousness: alert  Knowledge: good.   Normal comprehension.     Cranial Nerves     CN III, IV, VI   Pupils are equal, round, and reactive to light.  Extraocular motions are normal.     CN V   Right facial sensation deficit: none  Left facial sensation deficit: none    CN VII   Right facial weakness: none  Left facial weakness: none    CN VIII   Hearing: intact    CN IX, X   Palate: symmetric    CN XI   Right sternocleidomastoid strength: normal  Left sternocleidomastoid strength: normal  Right trapezius strength: normal  Left trapezius strength: normal    CN XII   Tongue deviation: none    Motor Exam     Strength   Right neck flexion: 5/5  Left neck flexion: 5/5  Right neck extension: 5/5  Left neck extension: 5/5  Right deltoid: 5/5  Left deltoid: 5/5  Right biceps: 5/5  Left biceps: 5/5  Right triceps: 5/5  Left triceps: 5/5  Right wrist flexion: 5/5  Left wrist flexion: 5/5  Right wrist extension: 5/5  Left wrist extension: 5/5  Right interossei: 5/5  Left interossei: 5/5  Right iliopsoas: 5/5  Left iliopsoas: 5/5  Right quadriceps:  5/5  Left quadriceps: 5/5  Right hamstrin/5  Left hamstrin/5  Right anterior tibial: 5/5  Left anterior tibial: 5/5  Right gastroc: 5/5  Left gastroc: 5/5    Sensory Exam   Right arm vibration: normal  Left arm vibration: normal  Right leg vibration: normal  Left leg vibration: normal    Gait, Coordination, and Reflexes     Gait  Gait: normal    Coordination   Romberg: negative  Finger to nose coordination: normal  Heel to shin coordination: normal  Tandem walking coordination: normal    Reflexes   Right brachioradialis: 2+  Left brachioradialis: 2+  Right biceps: 2+  Left biceps: 2+  Right triceps: 2+  Left triceps: 2+  Right patellar: 2+  Left patellar: 2+  Right achilles: 2+  Left achilles: 2+  Right plantar: equivocal  Left plantar: equivocal    Imaging:     Results for orders placed during the hospital encounter of 02/15/22    MRI Brain Demyelinating W W/O Contrast    Impression  1. Stable appearance of the brain with few foci of FLAIR and T2 hyperintense signal in the cerebral white matter is well as the brainstem.  There is a provided history of multiple sclerosis.  These findings would be consistent with epic provided diagnosis.  There are no new regions of signal abnormality in the brain.  There are no regions of restricted diffusion or abnormal enhancement to suggest interval progression of disease or active demyelination.  2. 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      Electronically signed by: Hossein Reyes MD  Date:    02/15/2022  Time:    07:40    Results for orders placed during the hospital encounter of 02/15/22    MRI Cervical Spine Demyelinating W W/O Contrast    Impression  1. Patient motion does limit evaluation.  There are regions of abnormal signal intensity in the cord.  These were present previously.  A  lesion in the posterior cord at the level of C1-2 through superior C3 was present previously but appears slightly larger on the current study.  This may reflect mild interval progression of disease but there is no abnormal enhancement to suggest active demyelination.  Similarly, a lesion previously seen in the left posterolateral cord at the level of C4 on C5 is without definite change.  Smaller lesions at the level of C3 or likely present on the most recent prior study but patient motion does limit evaluation.  2. There is no spinal stenosis or cord compression but foraminal narrowing at several levels due mostly due to uncovertebral spurring is without change and described above.      Electronically signed by: Hossein Reyes MD  Date:    02/15/2022  Time:    07:50    Results for orders placed during the hospital encounter of 05/28/21    MRI Thoracic Spine Demyelinating W W/O Contrast    Impression  1. There is a similar appearance of the thoracic spine and cord when compared to the prior study.  The images are degraded by patient body habitus and motion.  There is no fracture or malalignment.  The spinal canal is somewhat small on a developmental basis and there is prominent dorsal epidural fat (epidural lipomatosis).  These findings are unchanged.  There is no significant spinal canal or foraminal stenosis.  2. There is suggestion of abnormal T2 hyperintense signal in the left lateral cord at the level of T12.  This is better demonstrated and is more conspicuous on the comparison study.  There is no other definite region of signal abnormality in the thoracic cord.  There is no abnormal enhancement.      Electronically signed by: Hossein Reyes MD  Date:    05/28/2021  Time:    08:50        Labs:     Lab Results   Component Value Date    EUMPCRLO44SJ 16 (L) 04/19/2021     Lab Results   Component Value Date    JCVINDEX 0.20 (H) 05/20/2021    JCVANTIBODY INDETERMINATE (A) 05/20/2021     Lab Results   Component  Value Date    ZI0EHHXL 71.2 05/20/2021    ABSOLUTECD3 2308 (H) 05/20/2021    HH8BNOYE 16.0 05/20/2021    ABSOLUTECD8 519 05/20/2021    ZJ5CPTPZ 50.7 05/20/2021    ABSOLUTECD4 1643 (H) 05/20/2021    LABCD48 3.16 05/20/2021     Lab Results   Component Value Date    WBC 8.40 09/01/2022    RBC 4.98 09/01/2022    HGB 14.3 09/01/2022    HCT 44.2 09/01/2022    MCV 89 09/01/2022    MCH 28.7 09/01/2022    MCHC 32.4 09/01/2022    RDW 13.2 09/01/2022     09/01/2022    MPV 11.0 09/01/2022    GRAN 5.9 09/01/2022    GRAN 70.4 09/01/2022    LYMPH 1.6 09/01/2022    LYMPH 19.5 09/01/2022    MONO 0.6 09/01/2022    MONO 6.8 09/01/2022    EOS 0.2 09/01/2022    BASO 0.04 09/01/2022    EOSINOPHIL 2.4 09/01/2022    BASOPHIL 0.5 09/01/2022     Sodium   Date Value Ref Range Status   09/01/2022 140 136 - 145 mmol/L Final     Potassium   Date Value Ref Range Status   09/01/2022 4.3 3.5 - 5.1 mmol/L Final     Chloride   Date Value Ref Range Status   09/01/2022 105 95 - 110 mmol/L Final     CO2   Date Value Ref Range Status   09/01/2022 27 23 - 29 mmol/L Final     Glucose   Date Value Ref Range Status   09/01/2022 127 (H) 70 - 110 mg/dL Final     BUN   Date Value Ref Range Status   09/01/2022 13 6 - 20 mg/dL Final     Creatinine   Date Value Ref Range Status   09/01/2022 0.9 0.5 - 1.4 mg/dL Final     Calcium   Date Value Ref Range Status   09/01/2022 9.6 8.7 - 10.5 mg/dL Final     Total Protein   Date Value Ref Range Status   09/01/2022 7.4 6.0 - 8.4 g/dL Final     Albumin   Date Value Ref Range Status   09/01/2022 4.0 3.5 - 5.2 g/dL Final     Total Bilirubin   Date Value Ref Range Status   09/01/2022 0.4 0.1 - 1.0 mg/dL Final     Comment:     For infants and newborns, interpretation of results should be based  on gestational age, weight and in agreement with clinical  observations.    Premature Infant recommended reference ranges:  Up to 24 hours.............<8.0 mg/dL  Up to 48 hours............<12.0 mg/dL  3-5  days..................<15.0 mg/dL  6-29 days.................<15.0 mg/dL       Alkaline Phosphatase   Date Value Ref Range Status   09/01/2022 37 (L) 55 - 135 U/L Final     AST   Date Value Ref Range Status   09/01/2022 34 10 - 40 U/L Final     ALT   Date Value Ref Range Status   09/01/2022 38 10 - 44 U/L Final     Anion Gap   Date Value Ref Range Status   09/01/2022 8 8 - 16 mmol/L Final     eGFR if    Date Value Ref Range Status   02/15/2022 >60.0 >60 mL/min/1.73 m^2 Final     eGFR if non    Date Value Ref Range Status   02/15/2022 >60.0 >60 mL/min/1.73 m^2 Final     Comment:     Calculation used to obtain the estimated glomerular filtration  rate (eGFR) is the CKD-EPI equation.        Lab Results   Component Value Date    HEPBSAG Non-reactive 09/01/2022    HEPBSAB 91.66 09/01/2022    HEPBSAB Reactive 09/01/2022    HEPBCAB Non-reactive 09/01/2022       MS Impression and Plan:     NEURO MULTIPLE SCLEROSIS IMPRESSION:   MS Status:     Number of relapses in the past year?:  0  Plan:     DMT:  No change in management    DMT comment:  Continue Ocrevus and Vitamin D. His next infusion is due now. Safety labs have been reviewed. He is aware of the risks associated with immunosuppressant therapy, including increased risk of infection. This infusion will be at [facility].         Symptom Management:  Implement change in symptom management    Implement Change in Symptom Management:  Pain (He has had some erectile dysfunction, but never when he takes breaks from gabapentin when having a few drinks on the weekends. He will do a trial off of gabapentin with no alcohol to see if ED occurs. Either way, we can consider Viagra 50mg.)     He has had some constipation, but it's not clear if this is related to gabapentin or not. We will monitor this.   Gabapentin has been refilled. He is taking a break right now, but can take 3 tablets nightly with an additional tab in the middle of the night if needed  for total of 1200mg daily.   UA today to screen for infection.   He will follow up with Dr. Ramesh in 6 months.   We will wait to do MRIs in April 2023 so that deductible is met after March Ocrevus.     Total time spent with patient: 52 minutes   Total time spent on encounter: 65 minutes        MAREK Dumont, CNS    Problem List Items Addressed This Visit          Neurologic Problems    Multiple sclerosis - Primary     Other Visit Diagnoses       Encounter to establish care        Relevant Orders    Ambulatory referral/consult to Internal Medicine    Urinary hesitancy        Relevant Orders    Urinalysis, Reflex to Urine Culture Urine, Clean Catch    Neuropathic pain        Relevant Medications    gabapentin (NEURONTIN) 300 MG capsule    Vitamin D deficiency        Relevant Medications    cholecalciferol, vitamin D3, 1,250 mcg (50,000 unit) capsule    Counseling regarding goals of care        Prophylactic immunotherapy        High risk medication use

## 2022-09-19 ENCOUNTER — PATIENT MESSAGE (OUTPATIENT)
Dept: NEUROLOGY | Facility: CLINIC | Age: 27
End: 2022-09-19
Payer: COMMERCIAL

## 2022-09-27 ENCOUNTER — DOCUMENTATION ONLY (OUTPATIENT)
Dept: NEUROLOGY | Facility: CLINIC | Age: 27
End: 2022-09-27
Payer: COMMERCIAL

## 2022-09-30 ENCOUNTER — DOCUMENTATION ONLY (OUTPATIENT)
Dept: NEUROLOGY | Facility: CLINIC | Age: 27
End: 2022-09-30
Payer: COMMERCIAL

## 2022-10-13 ENCOUNTER — DOCUMENTATION ONLY (OUTPATIENT)
Dept: NEUROLOGY | Facility: CLINIC | Age: 27
End: 2022-10-13
Payer: COMMERCIAL

## 2022-10-13 NOTE — PROGRESS NOTES
FAXED PRESCRIBER ORDERS TO [facility] [phone]    FAXED PLAN OF TREATMENT TO [facility]   [phone]

## 2022-11-28 DIAGNOSIS — M79.2 NEUROPATHIC PAIN: ICD-10-CM

## 2022-12-01 ENCOUNTER — PATIENT MESSAGE (OUTPATIENT)
Dept: PSYCHIATRY | Facility: CLINIC | Age: 27
End: 2022-12-01
Payer: COMMERCIAL

## 2022-12-01 RX ORDER — GABAPENTIN 300 MG/1
CAPSULE ORAL
Qty: 120 CAPSULE | Refills: 2 | Status: SHIPPED | OUTPATIENT
Start: 2022-12-01 | End: 2023-03-29

## 2023-01-31 ENCOUNTER — PATIENT MESSAGE (OUTPATIENT)
Dept: NEUROLOGY | Facility: CLINIC | Age: 28
End: 2023-01-31
Payer: COMMERCIAL

## 2023-01-31 DIAGNOSIS — G35 MULTIPLE SCLEROSIS: Primary | ICD-10-CM

## 2023-02-02 NOTE — TELEPHONE ENCOUNTER
Spoke with patient wife and scheduled labs 2/10 at [facility] , follow up with  3/10 and waiting for for [facility] to put MRI orders in

## 2023-02-16 ENCOUNTER — LAB VISIT (OUTPATIENT)
Dept: LAB | Facility: HOSPITAL | Age: 28
End: 2023-02-16
Payer: COMMERCIAL

## 2023-02-16 DIAGNOSIS — G35 MULTIPLE SCLEROSIS: ICD-10-CM

## 2023-02-16 LAB
ALBUMIN SERPL BCP-MCNC: 4.3 G/DL (ref 3.5–5.2)
ALP SERPL-CCNC: 41 U/L (ref 55–135)
ALT SERPL W/O P-5'-P-CCNC: 25 U/L (ref 10–44)
ANION GAP SERPL CALC-SCNC: 8 MMOL/L (ref 8–16)
AST SERPL-CCNC: 22 U/L (ref 10–40)
BASOPHILS # BLD AUTO: 0.05 K/UL (ref 0–0.2)
BASOPHILS NFR BLD: 0.5 % (ref 0–1.9)
BILIRUB SERPL-MCNC: 0.4 MG/DL (ref 0.1–1)
BUN SERPL-MCNC: 13 MG/DL (ref 6–20)
CALCIUM SERPL-MCNC: 9.6 MG/DL (ref 8.7–10.5)
CHLORIDE SERPL-SCNC: 102 MMOL/L (ref 95–110)
CO2 SERPL-SCNC: 28 MMOL/L (ref 23–29)
CREAT SERPL-MCNC: 0.9 MG/DL (ref 0.5–1.4)
DIFFERENTIAL METHOD: NORMAL
EOSINOPHIL # BLD AUTO: 0.2 K/UL (ref 0–0.5)
EOSINOPHIL NFR BLD: 2.3 % (ref 0–8)
ERYTHROCYTE [DISTWIDTH] IN BLOOD BY AUTOMATED COUNT: 12.9 % (ref 11.5–14.5)
EST. GFR  (NO RACE VARIABLE): >60 ML/MIN/1.73 M^2
GLUCOSE SERPL-MCNC: 112 MG/DL (ref 70–110)
HCT VFR BLD AUTO: 46.5 % (ref 40–54)
HGB BLD-MCNC: 14.9 G/DL (ref 14–18)
IGA SERPL-MCNC: 188 MG/DL (ref 40–350)
IGG SERPL-MCNC: 1222 MG/DL (ref 650–1600)
IGM SERPL-MCNC: 28 MG/DL (ref 50–300)
IMM GRANULOCYTES # BLD AUTO: 0.02 K/UL (ref 0–0.04)
IMM GRANULOCYTES NFR BLD AUTO: 0.2 % (ref 0–0.5)
LYMPHOCYTES # BLD AUTO: 2.3 K/UL (ref 1–4.8)
LYMPHOCYTES NFR BLD: 21.8 % (ref 18–48)
MCH RBC QN AUTO: 28.5 PG (ref 27–31)
MCHC RBC AUTO-ENTMCNC: 32 G/DL (ref 32–36)
MCV RBC AUTO: 89 FL (ref 82–98)
MONOCYTES # BLD AUTO: 1 K/UL (ref 0.3–1)
MONOCYTES NFR BLD: 9.7 % (ref 4–15)
NEUTROPHILS # BLD AUTO: 6.8 K/UL (ref 1.8–7.7)
NEUTROPHILS NFR BLD: 65.5 % (ref 38–73)
NRBC BLD-RTO: 0 /100 WBC
PLATELET # BLD AUTO: 309 K/UL (ref 150–450)
PMV BLD AUTO: 11.2 FL (ref 9.2–12.9)
POTASSIUM SERPL-SCNC: 4.1 MMOL/L (ref 3.5–5.1)
PROT SERPL-MCNC: 7.6 G/DL (ref 6–8.4)
RBC # BLD AUTO: 5.22 M/UL (ref 4.6–6.2)
SODIUM SERPL-SCNC: 138 MMOL/L (ref 136–145)
WBC # BLD AUTO: 10.36 K/UL (ref 3.9–12.7)

## 2023-02-16 PROCEDURE — 85025 COMPLETE CBC W/AUTO DIFF WBC: CPT | Performed by: CLINICAL NURSE SPECIALIST

## 2023-02-16 PROCEDURE — 82784 ASSAY IGA/IGD/IGG/IGM EACH: CPT | Performed by: CLINICAL NURSE SPECIALIST

## 2023-02-16 PROCEDURE — 36415 COLL VENOUS BLD VENIPUNCTURE: CPT | Mod: PO | Performed by: CLINICAL NURSE SPECIALIST

## 2023-02-16 PROCEDURE — 87340 HEPATITIS B SURFACE AG IA: CPT | Performed by: CLINICAL NURSE SPECIALIST

## 2023-02-16 PROCEDURE — 86704 HEP B CORE ANTIBODY TOTAL: CPT | Performed by: CLINICAL NURSE SPECIALIST

## 2023-02-16 PROCEDURE — 80053 COMPREHEN METABOLIC PANEL: CPT | Performed by: CLINICAL NURSE SPECIALIST

## 2023-02-17 LAB
HBV CORE AB SERPL QL IA: NORMAL
HBV SURFACE AG SERPL QL IA: NORMAL

## 2023-02-20 ENCOUNTER — PATIENT MESSAGE (OUTPATIENT)
Dept: PSYCHIATRY | Facility: CLINIC | Age: 28
End: 2023-02-20
Payer: COMMERCIAL

## 2023-03-10 ENCOUNTER — LAB VISIT (OUTPATIENT)
Dept: LAB | Facility: HOSPITAL | Age: 28
End: 2023-03-10
Attending: STUDENT IN AN ORGANIZED HEALTH CARE EDUCATION/TRAINING PROGRAM
Payer: COMMERCIAL

## 2023-03-10 ENCOUNTER — OFFICE VISIT (OUTPATIENT)
Dept: NEUROLOGY | Facility: CLINIC | Age: 28
End: 2023-03-10
Payer: COMMERCIAL

## 2023-03-10 VITALS
HEART RATE: 84 BPM | HEIGHT: 70 IN | SYSTOLIC BLOOD PRESSURE: 123 MMHG | BODY MASS INDEX: 42.4 KG/M2 | WEIGHT: 296.19 LBS | DIASTOLIC BLOOD PRESSURE: 81 MMHG

## 2023-03-10 DIAGNOSIS — E55.9 VITAMIN D DEFICIENCY: ICD-10-CM

## 2023-03-10 DIAGNOSIS — M79.2 NEUROPATHIC PAIN: ICD-10-CM

## 2023-03-10 DIAGNOSIS — G35 MULTIPLE SCLEROSIS: ICD-10-CM

## 2023-03-10 DIAGNOSIS — E55.9 VITAMIN D DEFICIENCY: Primary | ICD-10-CM

## 2023-03-10 DIAGNOSIS — D84.9 IMMUNODEFICIENCY: ICD-10-CM

## 2023-03-10 DIAGNOSIS — F40.240 CLAUSTROPHOBIA: ICD-10-CM

## 2023-03-10 LAB — 25(OH)D3+25(OH)D2 SERPL-MCNC: 28 NG/ML (ref 30–96)

## 2023-03-10 PROCEDURE — 99215 OFFICE O/P EST HI 40 MIN: CPT | Mod: S$GLB,,, | Performed by: STUDENT IN AN ORGANIZED HEALTH CARE EDUCATION/TRAINING PROGRAM

## 2023-03-10 PROCEDURE — 3079F PR MOST RECENT DIASTOLIC BLOOD PRESSURE 80-89 MM HG: ICD-10-PCS | Mod: CPTII,S$GLB,, | Performed by: STUDENT IN AN ORGANIZED HEALTH CARE EDUCATION/TRAINING PROGRAM

## 2023-03-10 PROCEDURE — 3074F PR MOST RECENT SYSTOLIC BLOOD PRESSURE < 130 MM HG: ICD-10-PCS | Mod: CPTII,S$GLB,, | Performed by: STUDENT IN AN ORGANIZED HEALTH CARE EDUCATION/TRAINING PROGRAM

## 2023-03-10 PROCEDURE — 3008F PR BODY MASS INDEX (BMI) DOCUMENTED: ICD-10-PCS | Mod: CPTII,S$GLB,, | Performed by: STUDENT IN AN ORGANIZED HEALTH CARE EDUCATION/TRAINING PROGRAM

## 2023-03-10 PROCEDURE — 1159F MED LIST DOCD IN RCRD: CPT | Mod: CPTII,S$GLB,, | Performed by: STUDENT IN AN ORGANIZED HEALTH CARE EDUCATION/TRAINING PROGRAM

## 2023-03-10 PROCEDURE — 1159F PR MEDICATION LIST DOCUMENTED IN MEDICAL RECORD: ICD-10-PCS | Mod: CPTII,S$GLB,, | Performed by: STUDENT IN AN ORGANIZED HEALTH CARE EDUCATION/TRAINING PROGRAM

## 2023-03-10 PROCEDURE — 3079F DIAST BP 80-89 MM HG: CPT | Mod: CPTII,S$GLB,, | Performed by: STUDENT IN AN ORGANIZED HEALTH CARE EDUCATION/TRAINING PROGRAM

## 2023-03-10 PROCEDURE — 82306 VITAMIN D 25 HYDROXY: CPT | Performed by: STUDENT IN AN ORGANIZED HEALTH CARE EDUCATION/TRAINING PROGRAM

## 2023-03-10 PROCEDURE — 36415 COLL VENOUS BLD VENIPUNCTURE: CPT | Performed by: STUDENT IN AN ORGANIZED HEALTH CARE EDUCATION/TRAINING PROGRAM

## 2023-03-10 PROCEDURE — 99999 PR PBB SHADOW E&M-EST. PATIENT-LVL III: ICD-10-PCS | Mod: PBBFAC,,, | Performed by: STUDENT IN AN ORGANIZED HEALTH CARE EDUCATION/TRAINING PROGRAM

## 2023-03-10 PROCEDURE — 1160F RVW MEDS BY RX/DR IN RCRD: CPT | Mod: CPTII,S$GLB,, | Performed by: STUDENT IN AN ORGANIZED HEALTH CARE EDUCATION/TRAINING PROGRAM

## 2023-03-10 PROCEDURE — 3008F BODY MASS INDEX DOCD: CPT | Mod: CPTII,S$GLB,, | Performed by: STUDENT IN AN ORGANIZED HEALTH CARE EDUCATION/TRAINING PROGRAM

## 2023-03-10 PROCEDURE — 99215 PR OFFICE/OUTPT VISIT, EST, LEVL V, 40-54 MIN: ICD-10-PCS | Mod: S$GLB,,, | Performed by: STUDENT IN AN ORGANIZED HEALTH CARE EDUCATION/TRAINING PROGRAM

## 2023-03-10 PROCEDURE — 1160F PR REVIEW ALL MEDS BY PRESCRIBER/CLIN PHARMACIST DOCUMENTED: ICD-10-PCS | Mod: CPTII,S$GLB,, | Performed by: STUDENT IN AN ORGANIZED HEALTH CARE EDUCATION/TRAINING PROGRAM

## 2023-03-10 PROCEDURE — 99999 PR PBB SHADOW E&M-EST. PATIENT-LVL III: CPT | Mod: PBBFAC,,, | Performed by: STUDENT IN AN ORGANIZED HEALTH CARE EDUCATION/TRAINING PROGRAM

## 2023-03-10 PROCEDURE — 3074F SYST BP LT 130 MM HG: CPT | Mod: CPTII,S$GLB,, | Performed by: STUDENT IN AN ORGANIZED HEALTH CARE EDUCATION/TRAINING PROGRAM

## 2023-03-10 RX ORDER — DIAZEPAM 10 MG/1
TABLET ORAL
Qty: 2 TABLET | Refills: 0 | Status: SHIPPED | OUTPATIENT
Start: 2023-03-10 | End: 2023-09-21

## 2023-03-10 RX ORDER — AMITRIPTYLINE HYDROCHLORIDE 10 MG/1
10 TABLET, FILM COATED ORAL NIGHTLY PRN
Qty: 90 TABLET | Refills: 3 | Status: SHIPPED | OUTPATIENT
Start: 2023-03-10 | End: 2023-09-21

## 2023-03-10 NOTE — TELEPHONE ENCOUNTER
----- Message from Aarti Ridley sent at 2/28/2020  9:48 AM CST -----  Contact: [facility]  Type:  Pharmacy Calling to Clarify an RX    Name of Caller:  [facility]  Pharmacy Name:    [facility] - [facility] - [address]  [place] [address]  Phone: [phone] Fax: [phone]  Prescription Name:  diazePAM (VALIUM) 10 MG Tab  What do they need to clarify?:  Quantity  Best Call Back Number:  [phone]   Additional Information:  Please advise-thank you    
Dispense 1 tablet.    Please inform pharmacy.  
I have called in valium to his pharmacy.    That is the strongest I write for to help with MRI anxiety/ claustraphobia.  The only other option is general anesthesia, but her preferred not to do this the last time.    This will be only one MRI with and without contrast as oppposed to 2 with and without contrast.  So it should not take as long as the prior ones.  
Pharmacy was notified and indicated understanding.  
[place]-Mr. Avlarado is scheduled for an MRI on 3-7-20 and he said he needs medication to take prior to the exam. The last time he took Valium 10mg and it started wearing off half way through the MRI and he started freaking out. He is asking if you could give him something stronger? He would like it called to his [facility] pharmacy.  
Not applicable

## 2023-03-10 NOTE — PATIENT INSTRUCTIONS
Elavil 10mg at night, there is room to uptitrate if needed for nerve pain.   Follow up in 6 months  Vitamin D today   MRI in  4/10 7AM, valium sent to your pharmacy   Vendor list for cooling vest

## 2023-03-10 NOTE — PROGRESS NOTES
[facility]  Follow Up Patient Visit      Disease Summary     Principle neurological diagnosis: MS    Date of symptom onset: Nov 2019  Date of diagnosis: May 2021  Disease type at diagnosis: RR  Disease type currently: RR  Previous therapy: NA  Current therapy: Ocrevus 9/2021 - present  Last MRI Brain: 2/15/22  Last MRI C-spine: 2/15/22  Last MRI T-spine: 5/28/21  CSF: NA  JCV status: Neg 5/2021  Other relevant labs and tests: NMO and MOG Ab 5/2021 neg, Vit D 16 (4/2021)    Interval history:     He has been tolerating Ocrevus infusions well.   He has only completed the 1st series of hep B vaccine, last lab indicated that he has immunity     He had urinary retention at last visit but not anymore.     He is on gabapentin 1200mg Qhs for neuropathic pain (shooting and numbness and burning in his arm/legs at nighttime, sometimes can wake him up from sleep)    Denies vision changes, weakness, spasticity, BBI.     Heat sensitivity is still an issue. He applied [employer] cooling vest program but has never heard back.   He works in repair for chemical plants, fixing leaks etc. Frequent exposure to heat. 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      He also has a diagnosis CORINA, although snoring has ceased since starting Ocrevus. He has a CPAP but it does not fit well and he hasn't had time to return to sleep clinic for adjustments.     ROS:     SOCIAL HISTORY  Living arrangements - the patient lives with their family.  Social History     Socioeconomic History    Marital status:    Tobacco Use    Smoking status: Never    Smokeless tobacco: Current     Types: Snuff   Substance and Sexual Activity    Alcohol use: Yes     Comment: rarely    Drug use: Never    Sexual activity: Yes     Partners: Female     Birth control/protection: None       REVIEW OF SYMPTOMS 9/13/2022   Do you feel abnormally tired on most days? No   Do you feel you generally sleep  well? Yes   Do you have difficulty controlling your bladder?  Yes   Do you have difficulty controlling your bowels?  Yes   Do you have frequent muscle cramps, tightness or spasms in your limbs?  No   Do you have new visual symptoms?  No   Do you have worsening difficulty with your memory or thinking? No   Do you have worsening symptoms of anxiety or depression?  No   For patients who walk, Do you have more difficulty walking?  No   Have you fallen since your last visit?  No   For patients who use wheelchairs: Do you have any skin wounds or breakdown? Not Applicable   Do you have difficulty using your hands?  No   Do you have shooting or burning pain? Yes   Do you have difficulty with sexual function?  Yes   If you are sexually active, are you using birth control? Y/N  N/A No   Do you often choke when swallowing liquids or solid food?  No   Do you experience worsening symptoms when overheated? Yes   Do you need any new equipment such as a wheelchair, walker or shower chair? No   Do you receive co-pay financial assistance for your principal MS medicine? Yes   Would you be interested in participating in an MS research trial in the future? No   For patients on Gilenya, Tecfidera, Aubagio, Rituxan, Ocrevus, Tysabri, Lemtrada or Methotrexate, are you aware that you should NOT receive live virus vaccines?  Yes   Do you feel you have adequate family/friend support?  Yes   Do you have health insurance?   Yes   Are you currently employed? Yes   Do you receive SSDI/SSI?  Not Applicable   Do you use marijuana or cannabis products? No   Have you been diagnosed with a urinary tract infection since your last visit here? No   Have you been diagnosed with a respiratory tract infection since your last visit here? No   Have you been to the emergency room since your last visit here? No   Have you been hospitalized since your last visit here?  No     FSS SCORE & INTERPRETATION 11/30/2021   FSS SCORE  26   FSS SCORE INTERPRETATION May  not be suffering from fatigue     MS CANDICE-D SCORE & INTERPRETATION 11/30/2021   CANDICE-D SCORE  28   CANDICE-D INTERPRETATION  Possibility of major Depression     MS GEORGINA-7 SCORE & INTERPRETATION 11/30/2021   GEORGINA-7 SCORE  8   GEORGINA-7 SCORE INTERPRETATION Mild Anxiety     PEQ MS MOS PAIN EFFECTS SCORE & INTERPRETATION 11/30/2021   PES SCORE 12   PES SCORE INTERPRETATION Scores can range from 6-30.  Items are scaled so that higher scores indicate a greater impact of pain on a patients mood and behavior.     PEQ MS SEXUAL SATISFACTION SCORE & INTERPRETATION 11/30/2021   SSS SCORE  17   SSS SCORE INTERPRETATION Scores can range from 4-24.  Higher scores indicate greater problems with sexual satisfaction.     MS BLADDER CONTROL SCORE & INTERPRETATION 11/30/2021   BLCS SCORE 0   BLCS SCORE INTERPRETATION  Scores can range from 0-22, with higher scores indicating greater bladder control problems.     MS BOWEL CONTROL SCORE & INTERPRETATION 11/30/2021   BWCS SCORE 3   BWCS SCORE INTERPRETATION Scores can range from 0-26, with higher scores indicating greater bowel control problems.     PEQ MS IMPACT OF VISUAL IMPAIRMENT SCORE & INTERPRETATION 11/30/2021   ESTHER SCALE SCORE  0   ESTHER SCORE INTERPRETATION Scores can range from 0-15, with higher scores indicating greater impact of visual problems on daily activites.     MS PDQ SCORE & INTERPRETATION 11/30/2021   PDQ RETROSPECTIVE MEMORY SUBSCALE 0   PDQ ATTENTION/CONCENTRATION SUBSCALE 2   PDQ PROSPECTIVE MEMORY SUBSCALE 0   PDQ PLANNING/ORGANIZATION SUBSCALE 2   PDQ TOTAL SCORE 4   PDQ SCORE INTERPRETATION Scores can range from 0-80, with higher scores indicating greater perceived cognitive impairment.     MSSS SCORE & INTERPRETATION 11/30/2021   MSSS TANGIBLE SUPPORT SUBSCALE 25   MSSS EMOTIONAL/INFORMATIONAL SUPPORT SUBSCALE 28.13   MSSS AFFECTIONATE SUPPORT SUBSCALE 50   MSSS POSITIVE SOCIAL INTERACTION SUBSCALE 25   MSSS TOTAL SCORE 32.03   MSSS SCORE INTERPRETATION Scores can range  "from 0-100, with higher scores indicating greater perceived support.         Exam:     Vitals:    03/10/23 1257   BP: 123/81   Pulse: 84   Weight: 134.4 kg (296 lb 3 oz)   Height: 5' 10" (1.778 m)          In general, the patient is well nourished and appears to be in no acute distress.    MENTAL STATUS: language is fluent, normal verbal comprehension, short-term and remote memory is intact, attention is normal, patient is alert and oriented x 3, fund of knowlege is appropriate by vocabulary.     CRANIAL NERVE EXAM:  There is no intrernuclear ophthalmoplegia.  Extraocular muscles are intact. Pupils are equal, round, and reactive to light. No facial asymmetry. Facial sensation is intact bilaterally. There is no dysarthria. Uvula is midline, and palate moves symmetrically. Shoulder shrug intact bilaterlly. Tongue protrusion is midline. Hearing is intact to finger rub bilaterally. Neck is supple with full ROM    MOTOR EXAM: Normal bulk and tone throughout UE and LE bilaterally.   No pronator drift; rapid sequential movements are normal; Strength is  5/5 in all groups in the lower extremities and upper extremities    Strength:  R deltoid 5/5, L deltoid 5/5  R biceps 5/5, L biceps 5/5  R triceps 5/5, L triceps 5/5  R finger flexors 5/5, L finger flexors 5/5  R finger extensors 5/5, L finger extensors 5/5  R finger abductors 5/5, L finger abductors 5/5  R  hip flexors 5/5, L hip flexors 5/5  R  hip extensors 5/5, L hip extensors 5/5  R knee extensors 5/5, L knee extensors 5/5  R knee flexors 5/5, L knee flexors 5/5  R ankle dorsiflexors 5/5, L ankle dorsiflexors 5/5  R ankle plantarflexors 5/5, L ankle plantarflexors 5/5    REFLEXES: 2+ and symmetric throughout in all four extremeties; toes are down bilaterally; negative Hammond's, no cross-adductors    SENSORY EXAM: Normal to light touch, vibration, pinprick and proprioception throughout.    COORDINATION: Normal finger-to-nose exam. Normal heel-to-shin exam.    GAIT: " Narrow based and stable.     Negative Romberg's    Timed 25 Foot Walk: 3/7/2022 9/13/2022   Did patient wear an AFO? No No   Was assistive device used? No No   Time for 25 Foot Walk (seconds) 3.45 3.4   Time for 25 Foot Walk (seconds) 3.25 3.3         Imaging (personally reviewed):     No results found for this or any previous visit.    No results found for this or any previous visit.    No results found for this or any previous visit.    Results for orders placed during the hospital encounter of 02/15/22    MRI Brain Demyelinating W W/O Contrast    Impression  1. Stable appearance of the brain with few foci of FLAIR and T2 hyperintense signal in the cerebral white matter is well as the brainstem.  There is a provided history of multiple sclerosis.  These findings would be consistent with epic provided diagnosis.  There are no new regions of signal abnormality in the brain.  There are no regions of restricted diffusion or abnormal enhancement to suggest interval progression of disease or active demyelination.  2. 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      Electronically signed by: Hossein Reyes MD  Date:    02/15/2022  Time:    07:40    Results for orders placed during the hospital encounter of 02/15/22    MRI Cervical Spine Demyelinating W W/O Contrast    Impression  1. Patient motion does limit evaluation.  There are regions of abnormal signal intensity in the cord.  These were present previously.  A lesion in the posterior cord at the level of C1-2 through superior C3 was present previously but appears slightly larger on the current study.  This may reflect mild interval progression of disease but there is no abnormal enhancement to suggest active demyelination.  Similarly, a lesion previously seen in the left posterolateral cord  at the level of C4 on C5 is without definite change.  Smaller lesions at the level of C3 or likely present on the most recent prior study but patient motion does limit evaluation.  2. There is no spinal stenosis or cord compression but foraminal narrowing at several levels due mostly due to uncovertebral spurring is without change and described above.      Electronically signed by: Hossein Reyes MD  Date:    02/15/2022  Time:    07:50    Results for orders placed during the hospital encounter of 05/28/21    MRI Thoracic Spine Demyelinating W W/O Contrast    Impression  1. There is a similar appearance of the thoracic spine and cord when compared to the prior study.  The images are degraded by patient body habitus and motion.  There is no fracture or malalignment.  The spinal canal is somewhat small on a developmental basis and there is prominent dorsal epidural fat (epidural lipomatosis).  These findings are unchanged.  There is no significant spinal canal or foraminal stenosis.  2. There is suggestion of abnormal T2 hyperintense signal in the left lateral cord at the level of T12.  This is better demonstrated and is more conspicuous on the comparison study.  There is no other definite region of signal abnormality in the thoracic cord.  There is no abnormal enhancement.      Electronically signed by: Hossein Reyes MD  Date:    05/28/2021  Time:    08:50      Labs:     Lab Results   Component Value Date    ZXLACSFU12HF 16 (L) 04/19/2021     Lab Results   Component Value Date    JCVINDEX 0.20 (H) 05/20/2021    JCVANTIBODY INDETERMINATE (A) 05/20/2021     Lab Results   Component Value Date    RG0EUSFC 71.2 05/20/2021    ABSOLUTECD3 2308 (H) 05/20/2021    DR2LZWSX 16.0 05/20/2021    ABSOLUTECD8 519 05/20/2021    PX6KLIHT 50.7 05/20/2021    ABSOLUTECD4 1643 (H) 05/20/2021    LABCD48 3.16 05/20/2021     Lab Results   Component Value Date    WBC 10.36 02/16/2023    RBC 5.22 02/16/2023    HGB 14.9 02/16/2023    HCT  46.5 02/16/2023    MCV 89 02/16/2023    MCH 28.5 02/16/2023    MCHC 32.0 02/16/2023    RDW 12.9 02/16/2023     02/16/2023    MPV 11.2 02/16/2023    GRAN 6.8 02/16/2023    GRAN 65.5 02/16/2023    LYMPH 2.3 02/16/2023    LYMPH 21.8 02/16/2023    MONO 1.0 02/16/2023    MONO 9.7 02/16/2023    EOS 0.2 02/16/2023    BASO 0.05 02/16/2023    EOSINOPHIL 2.3 02/16/2023    BASOPHIL 0.5 02/16/2023     Sodium   Date Value Ref Range Status   02/16/2023 138 136 - 145 mmol/L Final     Potassium   Date Value Ref Range Status   02/16/2023 4.1 3.5 - 5.1 mmol/L Final     Chloride   Date Value Ref Range Status   02/16/2023 102 95 - 110 mmol/L Final     CO2   Date Value Ref Range Status   02/16/2023 28 23 - 29 mmol/L Final     Glucose   Date Value Ref Range Status   02/16/2023 112 (H) 70 - 110 mg/dL Final     BUN   Date Value Ref Range Status   02/16/2023 13 6 - 20 mg/dL Final     Creatinine   Date Value Ref Range Status   02/16/2023 0.9 0.5 - 1.4 mg/dL Final     Calcium   Date Value Ref Range Status   02/16/2023 9.6 8.7 - 10.5 mg/dL Final     Total Protein   Date Value Ref Range Status   02/16/2023 7.6 6.0 - 8.4 g/dL Final     Albumin   Date Value Ref Range Status   02/16/2023 4.3 3.5 - 5.2 g/dL Final     Total Bilirubin   Date Value Ref Range Status   02/16/2023 0.4 0.1 - 1.0 mg/dL Final     Comment:     For infants and newborns, interpretation of results should be based  on gestational age, weight and in agreement with clinical  observations.    Premature Infant recommended reference ranges:  Up to 24 hours.............<8.0 mg/dL  Up to 48 hours............<12.0 mg/dL  3-5 days..................<15.0 mg/dL  6-29 days.................<15.0 mg/dL       Alkaline Phosphatase   Date Value Ref Range Status   02/16/2023 41 (L) 55 - 135 U/L Final     AST   Date Value Ref Range Status   02/16/2023 22 10 - 40 U/L Final     ALT   Date Value Ref Range Status   02/16/2023 25 10 - 44 U/L Final     Anion Gap   Date Value Ref Range Status    02/16/2023 8 8 - 16 mmol/L Final     eGFR if    Date Value Ref Range Status   02/15/2022 >60.0 >60 mL/min/1.73 m^2 Final     eGFR if non    Date Value Ref Range Status   02/15/2022 >60.0 >60 mL/min/1.73 m^2 Final     Comment:     Calculation used to obtain the estimated glomerular filtration  rate (eGFR) is the CKD-EPI equation.                   Diagnosis/Assessment/Plan:     Multiple Sclerosis  -Assessment: RRMS with brain and spinal cord lesions stable on Ocrevus. Exam stable.   -Imaging: Updated MRI brain and c-spine now  -Disease Modifying Therapies: Continue Ocrevus, screening labs reviewed.   Symptom management   -Nighttime neuropathy: add Elavil 10mg Qhs, uptitrate if needed, continue Gabapentin 1200mg Qhs if tolerating without side effects  -Vitamin D, check level today  -Stressed that he needs annual PCP visit for wellness checks, as well as routine cancer screening, since increased occupational exposure to radiation and being on Ocrevus  -CORINA, might need an updated sleep study   Lifestyle modifications for brain health discussed.  Return to clinic in 6 months                 Total time spent with the patient: 52 minutes, including face to face consultation, chart review and coordination of care, on the day of the visit. This includes face to face time and non-face to face time preparing to see the patient (eg, review of tests), obtaining and/or reviewing separately obtained history, documenting clinical information in the electronic or other health record, independently interpreting resultsand communicating results to the patient/family/caregiver, or care coordination.         Glo Ramesh MD, MSc  Attending neurologist

## 2023-03-14 ENCOUNTER — TELEPHONE (OUTPATIENT)
Dept: NEUROLOGY | Facility: CLINIC | Age: 28
End: 2023-03-14
Payer: COMMERCIAL

## 2023-03-14 NOTE — TELEPHONE ENCOUNTER
Spoke with Sandra, confirmed pt is scheduled on 4/7/23 for Ocrevus, and nothing is needed from our office at this time.

## 2023-03-14 NOTE — TELEPHONE ENCOUNTER
----- Message from Ruthie Spain, APRN, CNS sent at 2/18/2023 11:45 AM CST -----  Low IgM  Normal CMP   Negative hep B labs  Normal WBC and ALC     Ok to proceed with scheduling infusion

## 2023-04-11 ENCOUNTER — PATIENT MESSAGE (OUTPATIENT)
Dept: NEUROLOGY | Facility: CLINIC | Age: 28
End: 2023-04-11
Payer: COMMERCIAL

## 2023-05-06 ENCOUNTER — PATIENT MESSAGE (OUTPATIENT)
Dept: NEUROLOGY | Facility: CLINIC | Age: 28
End: 2023-05-06
Payer: COMMERCIAL

## 2023-05-11 DIAGNOSIS — N52.9 ERECTILE DYSFUNCTION, UNSPECIFIED ERECTILE DYSFUNCTION TYPE: Primary | ICD-10-CM

## 2023-05-11 RX ORDER — SILDENAFIL 50 MG/1
50 TABLET, FILM COATED ORAL DAILY PRN
Qty: 15 TABLET | Refills: 0 | Status: SHIPPED | OUTPATIENT
Start: 2023-05-11 | End: 2023-07-26

## 2023-06-27 DIAGNOSIS — M79.2 NEUROPATHIC PAIN: ICD-10-CM

## 2023-06-30 RX ORDER — GABAPENTIN 300 MG/1
CAPSULE ORAL
Qty: 120 CAPSULE | Refills: 2 | Status: SHIPPED | OUTPATIENT
Start: 2023-06-30 | End: 2023-08-28 | Stop reason: SDUPTHER

## 2023-07-21 ENCOUNTER — PATIENT MESSAGE (OUTPATIENT)
Dept: PSYCHIATRY | Facility: CLINIC | Age: 28
End: 2023-07-21
Payer: COMMERCIAL

## 2023-07-26 DIAGNOSIS — N52.9 ERECTILE DYSFUNCTION, UNSPECIFIED ERECTILE DYSFUNCTION TYPE: ICD-10-CM

## 2023-07-26 RX ORDER — SILDENAFIL 50 MG/1
TABLET, FILM COATED ORAL
Qty: 15 TABLET | Refills: 0 | Status: SHIPPED | OUTPATIENT
Start: 2023-07-26 | End: 2023-12-11 | Stop reason: SDUPTHER

## 2023-08-28 ENCOUNTER — PATIENT MESSAGE (OUTPATIENT)
Dept: NEUROLOGY | Facility: CLINIC | Age: 28
End: 2023-08-28
Payer: COMMERCIAL

## 2023-08-28 DIAGNOSIS — M79.2 NEUROPATHIC PAIN: ICD-10-CM

## 2023-08-28 RX ORDER — GABAPENTIN 300 MG/1
600 CAPSULE ORAL NIGHTLY
Qty: 180 CAPSULE | Refills: 2 | Status: SHIPPED | OUTPATIENT
Start: 2023-08-28 | End: 2023-09-21 | Stop reason: SDUPTHER

## 2023-09-15 ENCOUNTER — HOSPITAL ENCOUNTER (OUTPATIENT)
Dept: RADIOLOGY | Facility: HOSPITAL | Age: 28
Discharge: HOME | End: 2023-09-15
Attending: CLINICAL NURSE SPECIALIST
Payer: COMMERCIAL

## 2023-09-15 DIAGNOSIS — G35 MULTIPLE SCLEROSIS: ICD-10-CM

## 2023-09-15 PROCEDURE — 70551 MRI BRAIN STEM W/O DYE: CPT | Mod: TC,PO

## 2023-09-15 PROCEDURE — 72141 MRI CERVICAL SPINE DEMYELINATING WITHOUT CONTRAST: ICD-10-PCS | Mod: 26,,, | Performed by: RADIOLOGY

## 2023-09-15 PROCEDURE — 72141 MRI NECK SPINE W/O DYE: CPT | Mod: TC,PO

## 2023-09-15 PROCEDURE — 70551 MRI BRAIN DEMYELINATING WITHOUT CONTRAST: ICD-10-PCS | Mod: 26,,, | Performed by: RADIOLOGY

## 2023-09-15 PROCEDURE — 72141 MRI NECK SPINE W/O DYE: CPT | Mod: 26,,, | Performed by: RADIOLOGY

## 2023-09-15 PROCEDURE — 70551 MRI BRAIN STEM W/O DYE: CPT | Mod: 26,,, | Performed by: RADIOLOGY

## 2023-09-21 ENCOUNTER — OFFICE VISIT (OUTPATIENT)
Dept: NEUROLOGY | Facility: CLINIC | Age: 28
End: 2023-09-21
Payer: COMMERCIAL

## 2023-09-21 VITALS
BODY MASS INDEX: 44.49 KG/M2 | HEART RATE: 86 BPM | SYSTOLIC BLOOD PRESSURE: 126 MMHG | DIASTOLIC BLOOD PRESSURE: 84 MMHG | WEIGHT: 310.75 LBS | HEIGHT: 70 IN

## 2023-09-21 DIAGNOSIS — M79.2 NEUROPATHIC PAIN: ICD-10-CM

## 2023-09-21 DIAGNOSIS — E55.9 VITAMIN D DEFICIENCY: ICD-10-CM

## 2023-09-21 DIAGNOSIS — G35 MULTIPLE SCLEROSIS: Primary | ICD-10-CM

## 2023-09-21 PROCEDURE — 99417 PR PROLONGED SVC, OUTPT, W/WO DIRECT PT CONTACT,  EA ADDTL 15 MIN: ICD-10-PCS | Mod: S$GLB,,,

## 2023-09-21 PROCEDURE — 99215 PR OFFICE/OUTPT VISIT, EST, LEVL V, 40-54 MIN: ICD-10-PCS | Mod: S$GLB,,,

## 2023-09-21 PROCEDURE — 3079F PR MOST RECENT DIASTOLIC BLOOD PRESSURE 80-89 MM HG: ICD-10-PCS | Mod: CPTII,S$GLB,,

## 2023-09-21 PROCEDURE — 3079F DIAST BP 80-89 MM HG: CPT | Mod: CPTII,S$GLB,,

## 2023-09-21 PROCEDURE — 3074F PR MOST RECENT SYSTOLIC BLOOD PRESSURE < 130 MM HG: ICD-10-PCS | Mod: CPTII,S$GLB,,

## 2023-09-21 PROCEDURE — 3074F SYST BP LT 130 MM HG: CPT | Mod: CPTII,S$GLB,,

## 2023-09-21 PROCEDURE — 1159F MED LIST DOCD IN RCRD: CPT | Mod: CPTII,S$GLB,,

## 2023-09-21 PROCEDURE — 1160F RVW MEDS BY RX/DR IN RCRD: CPT | Mod: CPTII,S$GLB,,

## 2023-09-21 PROCEDURE — 99215 OFFICE O/P EST HI 40 MIN: CPT | Mod: S$GLB,,,

## 2023-09-21 PROCEDURE — 1160F PR REVIEW ALL MEDS BY PRESCRIBER/CLIN PHARMACIST DOCUMENTED: ICD-10-PCS | Mod: CPTII,S$GLB,,

## 2023-09-21 PROCEDURE — 99999 PR PBB SHADOW E&M-EST. PATIENT-LVL III: CPT | Mod: PBBFAC,,,

## 2023-09-21 PROCEDURE — 99417 PROLNG OP E/M EACH 15 MIN: CPT | Mod: S$GLB,,,

## 2023-09-21 PROCEDURE — 3008F PR BODY MASS INDEX (BMI) DOCUMENTED: ICD-10-PCS | Mod: CPTII,S$GLB,,

## 2023-09-21 PROCEDURE — 99999 PR PBB SHADOW E&M-EST. PATIENT-LVL III: ICD-10-PCS | Mod: PBBFAC,,,

## 2023-09-21 PROCEDURE — 1159F PR MEDICATION LIST DOCUMENTED IN MEDICAL RECORD: ICD-10-PCS | Mod: CPTII,S$GLB,,

## 2023-09-21 PROCEDURE — 3008F BODY MASS INDEX DOCD: CPT | Mod: CPTII,S$GLB,,

## 2023-09-21 RX ORDER — GABAPENTIN 600 MG/1
1200 TABLET ORAL NIGHTLY
Qty: 180 TABLET | Refills: 1 | Status: SHIPPED | OUTPATIENT
Start: 2023-09-21 | End: 2024-03-21

## 2023-09-21 RX ORDER — ASPIRIN 325 MG
TABLET, DELAYED RELEASE (ENTERIC COATED) ORAL
Qty: 12 CAPSULE | Refills: 3 | Status: SHIPPED | OUTPATIENT
Start: 2023-09-21

## 2023-09-21 RX ORDER — GABAPENTIN 300 MG/1
300 CAPSULE ORAL NIGHTLY
Qty: 90 CAPSULE | Refills: 1 | Status: SHIPPED | OUTPATIENT
Start: 2023-09-21

## 2023-09-21 RX ORDER — DULOXETIN HYDROCHLORIDE 30 MG/1
30 CAPSULE, DELAYED RELEASE ORAL NIGHTLY
Qty: 90 CAPSULE | Refills: 1 | Status: SHIPPED | OUTPATIENT
Start: 2023-09-21 | End: 2024-03-21

## 2023-09-21 NOTE — Clinical Note
Is there anywhere else he can do his infusion, maybe for next time? Or should I just let him know to talk to his insurance company?

## 2023-09-21 NOTE — PROGRESS NOTES
"Patient ID: Christiano Aguayo is a 28 y.o. male who presents today for a routine clinic visit for MS.  He was last seen by Dr. Ramesh on 3/10/2023.  The history was provided by the patient. He is accompanied by his wife.     Principle neurological diagnosis: MS  Date of symptom onset: Nov 2019  Date of diagnosis: May 2021  Disease type at diagnosis: RR  Disease type currently: RR  Previous therapy: NA  Current therapy: Ocrevus 9/2021 - present  Vitamin D Dose: 50,000IU weekly   Last MRI Brain: 9/15/2023 - stable   Last MRI C-spine: 9/15/26868 - stable  Last MRI T-spine: 5/28/21 - one lesion   CSF: NA  JCV status: Neg 5/2021  Other relevant labs and tests: NMO and MOG Ab 5/2021 neg, Vit D 16 (4/2021)    Subjective:     He voices that he has been having extreme symptoms, more so happening at night.  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 He feels the majority of the symptoms occur at night.  He does not need to take gabapentin at night and is up to taking [id number] nightly and nothing during the day.  That is the only way he is able to get through the night.     He has tried Elavil for about 3 months and did not see a difference in sleep.     He is very nervous that his work life is going to cause him to relapse.      He has a couple job interviews coming up and just yesterday he was so frustrated at work that he said he quit and put in his "2 weeks".   ROS:      9/21/2023     8:52 AM   REVIEW OF SYMPTOMS   Do you feel abnormally tired on most days? Yes - more work related    Do you feel you generally sleep well? No - due to tingling in his legs   Do you have difficulty controlling your bladder?  No    Do you have difficulty controlling your bowels?  Yes - he has urgency at times    Do you have frequent muscle cramps, tightness or spasms in your limbs?  Yes - after repetitive motion his hands will tighten up but it is only for brief period  "   Do you have new visual symptoms?  No   Do you have worsening difficulty with your memory or thinking? No   Do you have worsening symptoms of anxiety or depression?  Yes - work related    For patients who walk, Do you have more difficulty walking?  No   Have you fallen since your last visit?  No   For patients who use wheelchairs: Do you have any skin wounds or breakdown? Not Applicable   Do you have difficulty using your hands?  Yes - see above    Do you have shooting or burning pain? Yes - see above   Do you have difficulty with sexual function?  Yes - Viagra helps    If you are sexually active, are you using birth control? Y/N  N/A No   Do you often choke when swallowing liquids or solid food?  No   Do you experience worsening symptoms when overheated? Yes   Do you need any new equipment such as a wheelchair, walker or shower chair? No   Do you receive co-pay financial assistance for your principal MS medicine? Yes    Would you be interested in participating in an MS research trial in the future? No   For patients on Gilenya, Tecfidera, Aubagio, Rituxan, Ocrevus, Tysabri, Lemtrada or Methotrexate, are you aware that you should NOT receive live virus vaccines?  Yes   Do you feel you have adequate family/friend support?  Yes    Do you have health insurance?   Yes   Are you currently employed? Yes   Do you receive SSDI/SSI?  Not Applicable   Do you use marijuana or cannabis products? No   Have you been diagnosed with a urinary tract infection since your last visit here? No   Have you been diagnosed with a respiratory tract infection since your last visit here? No   Have you been to the emergency room since your last visit here? No   Have you been hospitalized since your last visit here?  No            9/21/2023     9:00 AM   FSS SCORE & INTERPRETATION   FSS SCORE  43   FSS SCORE INTERPRETATION May be suffering from fatigue         9/21/2023     8:58 AM   MS CANDICE-D SCORE & INTERPRETATION   CANDICE-D SCORE  32   CANDICE-D  INTERPRETATION  Possibility of major Depression         9/21/2023     8:54 AM   MS GEORGINA-7 SCORE & INTERPRETATION   GEORGINA-7 SCORE  16   GEORGINA-7 SCORE INTERPRETATION Severe Anxiety         9/21/2023     9:01 AM   PEQ MS MOS PAIN EFFECTS SCORE & INTERPRETATION   PES SCORE 24   PES SCORE INTERPRETATION Scores can range from 6-30.  Items are scaled so that higher scores indicate a greater impact of pain on a patients mood and behavior.         9/21/2023     9:03 AM   PEQ MS SEXUAL SATISFACTION SCORE & INTERPRETATION   SSS SCORE  14   SSS SCORE INTERPRETATION Scores can range from 4-24.  Higher scores indicate greater problems with sexual satisfaction.         9/21/2023     9:04 AM   MS BLADDER CONTROL SCORE & INTERPRETATION   BLCS SCORE 5   BLCS SCORE INTERPRETATION  Scores can range from 0-22, with higher scores indicating greater bladder control problems.         9/21/2023     9:09 AM   MS BOWEL CONTROL SCORE & INTERPRETATION   BWCS SCORE 7   BWCS SCORE INTERPRETATION Scores can range from 0-26, with higher scores indicating greater bowel control problems.         9/21/2023     9:05 AM   PEQ MS IMPACT OF VISUAL IMPAIRMENT SCORE & INTERPRETATION   ESTHER SCALE SCORE  0   ESTHER SCORE INTERPRETATION Scores can range from 0-15, with higher scores indicating greater impact of visual problems on daily activites.         9/21/2023     9:08 AM   MS PDQ SCORE & INTERPRETATION   PDQ RETROSPECTIVE MEMORY SUBSCALE 0   PDQ ATTENTION/CONCENTRATION SUBSCALE 3   PDQ PROSPECTIVE MEMORY SUBSCALE 0   PDQ PLANNING/ORGANIZATION SUBSCALE 5   PDQ TOTAL SCORE 8   PDQ SCORE INTERPRETATION Scores can range from 0-80, with higher scores indicating greater perceived cognitive impairment.         9/21/2023     9:12 AM   MSSS SCORE & INTERPRETATION   MSSS TANGIBLE SUPPORT SUBSCALE 62.5   MSSS EMOTIONAL/INFORMATIONAL SUPPORT SUBSCALE 0   MSSS AFFECTIONATE SUPPORT SUBSCALE 50   MSSS POSITIVE SOCIAL INTERACTION SUBSCALE 33.33   MSSS TOTAL SCORE 36.46   MSSS  SCORE INTERPRETATION Scores can range from 0-100, with higher scores indicating greater perceived support.        SOCIAL HISTORY  Living arrangements - the patient lives with their family.  Social History     Socioeconomic History    Marital status:    Tobacco Use    Smoking status: Never    Smokeless tobacco: Current     Types: Snuff   Substance and Sexual Activity    Alcohol use: Yes     Comment: rarely    Drug use: Never    Sexual activity: Yes     Partners: Female     Birth control/protection: None     Social Determinants of Health     Stress: No Stress Concern Present (10/8/2019)    [employer] [place] [employer] - Occupational Stress Questionnaire     Feeling of Stress : Only a little       Current Outpatient Medications on File Prior to Visit   Medication Sig Dispense Refill    ocrelizumab (OCREVUS) 30 mg/mL Soln Infuse Ocrevus 600mg in 500mL of 0.9% NaCl IV every 24 weeks. Pre-meds: Solumedrol 100mg IVPB, Benadryl 50mg IVP, Tylenol 1000mg PO, Pepcid 20mg IVP. 20 mL 1    sildenafiL (VIAGRA) 50 MG tablet TAKE 1 TABLET DAILY AS NEEDED FOR ERECTILE DYSFUNCTION *THANK YOU* 15 tablet 0    [DISCONTINUED] amitriptyline (ELAVIL) 10 MG tablet Take 1 tablet (10 mg total) by mouth nightly as needed for Insomnia or Pain. 90 tablet 3    [DISCONTINUED] cholecalciferol, vitamin D3, 1,250 mcg (50,000 unit) capsule TAKE ONE CAPSULE EVERY WEEK ON THE SAME DAY *THANK YOU* 12 capsule 1    [DISCONTINUED] gabapentin (NEURONTIN) 300 MG capsule Take 2 capsules (600 mg total) by mouth every evening. 180 capsule 2    [DISCONTINUED] diazePAM (VALIUM) 10 MG Tab Take 10mg po 1 hour prior to MRI. Can take another 10mg if needed just prior to MRI. 2 tablet 0     No current facility-administered medications on file prior to visit.       Objective:     1. 25 foot timed walk:      3/10/2023    12:01 AM 9/21/2023    12:01 AM   Timed 25 Foot Walk:   Did patient wear an AFO? No No   Was assistive device used? No No   Time for 25  Foot Walk (seconds) 3.95 3.8   Time for 25 Foot Walk (seconds) 4.05 3.77           NEURO EXAM    In general, the patient is well nourished and appears to be in no acute distress.    MENTAL STATUS: language is fluent, normal verbal comprehension, short-term and remote memory is intact, attention is normal, patient is alert and oriented x 3, fund of knowlege is appropriate by vocabulary.     CRANIAL NERVE EXAM:  There is no internuclear ophthalmoplegia.  Extraocular muscles are intact. No facial asymmetry. Facial sensation is intact bilaterally. There is no dysarthria. Uvula is midline, and palate moves symmetrically. Shoulder shrug intact bilaterlly. Tongue protrusion is midline. Hearing is intact to finger rub bilaterally. Neck is supple with full ROM    MOTOR EXAM: Normal bulk and tone throughout UE and LE bilaterally.  rapid sequential movements are normal; Strength is  5/5 in all groups in the lower extremities and upper extremities    REFLEXES: 2+ and symmetric throughout in all four extremities    SENSORY EXAM: Normal to light touch, and vibration throughout.    COORDINATION: Normal finger-to-nose exam. Normal heel-to-shin exam.    GAIT: Narrow based and stable. Able to heel walk, toe walk, and perform tandem gait.     Negative Romberg's    Imaging:     Personally reviewed and discussed with patient.     Results for orders placed during the hospital encounter of 09/15/23    MRI Brain Demyelinating Without Contrast    Impression  BRAIN:    1. Significantly motion degraded exam demonstrates no gross interval change again with findings that would be consistent with patient's clinical history of an underlying demyelinating process with mild plaque burden.  No acute process.  2. 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  CERVICAL:    1. Significantly motion degraded exam demonstrates stable short-segment presumed demyelinating plaques of  the upper cervical spine.  No acute process.  2. Degenerative changes at C3-C4 and C4-C5 are stable from the prior exam most notable for foraminal narrowing discussed above.      Electronically signed by: Fabricio Lala  Date:    09/15/2023  Time:    10:17    Results for orders placed during the hospital encounter of 09/15/23    MRI Cervical Spine Demyelinating Without Contrast    Impression  BRAIN:    1. Significantly motion degraded exam demonstrates no gross interval change again with findings that would be consistent with patient's clinical history of an underlying demyelinating process with mild plaque burden.  No acute process.  2. 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  CERVICAL:    1. Significantly motion degraded exam demonstrates stable short-segment presumed demyelinating plaques of the upper cervical spine.  No acute process.  2. Degenerative changes at C3-C4 and C4-C5 are stable from the prior exam most notable for foraminal narrowing discussed above.      Electronically signed by: Fabricio Lala  Date:    09/15/2023  Time:    10:17    No results found for this or any previous visit.    Results for orders placed during the hospital encounter of 02/15/22    MRI Brain Demyelinating W W/O Contrast    Impression  1. Stable appearance of the brain with few foci of FLAIR and T2 hyperintense signal in the cerebral white matter is well as the brainstem.  There is a provided history of multiple sclerosis.  These findings would be consistent with epic provided diagnosis.  There are no new regions of signal abnormality in the brain.  There are no regions of restricted diffusion or abnormal enhancement to suggest interval progression of disease or active demyelination.  2. There is a stable subcentimeter T1 hyperintense focus along the posterosuperior aspect of the infundibulum again possibly representing an ectopic neurohypophysis, lipoma or  less likely proteinaceous Rathke's cleft cyst.      Electronically signed by: Hossein Reyes MD  Date:    02/15/2022  Time:    07:40    Results for orders placed during the hospital encounter of 02/15/22    MRI Cervical Spine Demyelinating W W/O Contrast    Impression  1. Patient motion does limit evaluation.  There are regions of abnormal signal intensity in the cord.  These were present previously.  A lesion in the posterior cord at the level of C1-2 through superior C3 was present previously but appears slightly larger on the current study.  This may reflect mild interval progression of disease but there is no abnormal enhancement to suggest active demyelination.  Similarly, a lesion previously seen in the left posterolateral cord at the level of C4 on C5 is without definite change.  Smaller lesions at the level of C3 or likely present on the most recent prior study but patient motion does limit evaluation.  2. There is no spinal stenosis or cord compression but foraminal narrowing at several levels due mostly due to uncovertebral spurring is without change and described above.      Electronically signed by: Hossein Reyes MD  Date:    02/15/2022  Time:    07:50    Results for orders placed during the hospital encounter of 05/28/21    MRI Thoracic Spine Demyelinating W W/O Contrast    Impression  1. There is a similar appearance of the thoracic spine and cord when compared to the prior study.  The images are degraded by patient body habitus and motion.  There is no fracture or malalignment.  The spinal canal is somewhat small on a developmental basis and there is prominent dorsal epidural fat (epidural lipomatosis).  These findings are unchanged.  There is no significant spinal canal or foraminal stenosis.  2. There is suggestion of abnormal T2 hyperintense signal in the left lateral cord at the level of T12.  This is better demonstrated and is more conspicuous on the comparison study.  There is no other  definite region of signal abnormality in the thoracic cord.  There is no abnormal enhancement.      Electronically signed by: Hossein Reyes MD  Date:    05/28/2021  Time:    08:50      Labs:     Lab Results   Component Value Date    EASVRZGX55UJ 28 (L) 03/10/2023    ETEHAQJU90XF 16 (L) 04/19/2021     Lab Results   Component Value Date    JCVINDEX 0.20 (H) 05/20/2021    JCVANTIBODY INDETERMINATE (A) 05/20/2021     Lab Results   Component Value Date    FG1HMPGP 71.2 05/20/2021    ABSOLUTECD3 2308 (H) 05/20/2021    AC3WSZLD 16.0 05/20/2021    ABSOLUTECD8 519 05/20/2021    UO5HQIPX 50.7 05/20/2021    ABSOLUTECD4 1643 (H) 05/20/2021    LABCD48 3.16 05/20/2021     Lab Results   Component Value Date    WBC 10.53 09/15/2023    RBC 5.27 09/15/2023    HGB 15.0 09/15/2023    HCT 44.2 09/15/2023    MCV 84 09/15/2023    MCH 28.5 09/15/2023    MCHC 33.9 09/15/2023    RDW 12.9 09/15/2023     09/15/2023    MPV 10.7 09/15/2023    GRAN 7.3 09/15/2023    GRAN 69.3 09/15/2023    LYMPH 2.1 09/15/2023    LYMPH 19.8 09/15/2023    MONO 0.9 09/15/2023    MONO 8.1 09/15/2023    EOS 0.2 09/15/2023    BASO 0.04 09/15/2023    EOSINOPHIL 2.1 09/15/2023    BASOPHIL 0.4 09/15/2023     Sodium   Date Value Ref Range Status   09/15/2023 139 136 - 145 mmol/L Final     Potassium   Date Value Ref Range Status   09/15/2023 4.7 3.5 - 5.1 mmol/L Final     Chloride   Date Value Ref Range Status   09/15/2023 104 95 - 110 mmol/L Final     CO2   Date Value Ref Range Status   09/15/2023 28 23 - 29 mmol/L Final     Glucose   Date Value Ref Range Status   09/15/2023 103 70 - 110 mg/dL Final     BUN   Date Value Ref Range Status   09/15/2023 14 6 - 20 mg/dL Final     Creatinine   Date Value Ref Range Status   09/15/2023 0.9 0.5 - 1.4 mg/dL Final     Calcium   Date Value Ref Range Status   09/15/2023 9.7 8.7 - 10.5 mg/dL Final     Total Protein   Date Value Ref Range Status   09/15/2023 7.7 6.0 - 8.4 g/dL Final     Albumin   Date Value Ref Range Status    09/15/2023 4.1 3.5 - 5.2 g/dL Final     Total Bilirubin   Date Value Ref Range Status   09/15/2023 0.5 0.1 - 1.0 mg/dL Final     Comment:     For infants and newborns, interpretation of results should be based  on gestational age, weight and in agreement with clinical  observations.    Premature Infant recommended reference ranges:  Up to 24 hours.............<8.0 mg/dL  Up to 48 hours............<12.0 mg/dL  3-5 days..................<15.0 mg/dL  6-29 days.................<15.0 mg/dL       Alkaline Phosphatase   Date Value Ref Range Status   09/15/2023 38 (L) 55 - 135 U/L Final     AST   Date Value Ref Range Status   09/15/2023 24 10 - 40 U/L Final     ALT   Date Value Ref Range Status   09/15/2023 36 10 - 44 U/L Final     Anion Gap   Date Value Ref Range Status   09/15/2023 7 (L) 8 - 16 mmol/L Final     eGFR if    Date Value Ref Range Status   02/15/2022 >60.0 >60 mL/min/1.73 m^2 Final     eGFR if non    Date Value Ref Range Status   02/15/2022 >60.0 >60 mL/min/1.73 m^2 Final     Comment:     Calculation used to obtain the estimated glomerular filtration  rate (eGFR) is the CKD-EPI equation.        Lab Results   Component Value Date    HEPBSAG Non-reactive 09/15/2023    HEPBSAB 91.66 09/01/2022    HEPBSAB Reactive 09/01/2022    HEPBCAB Non-reactive 09/15/2023           MS Impression and Plan:     NEURO MULTIPLE SCLEROSIS IMPRESSION:   MS Status:     Number of relapses in the past year?:  0    Clinical Progression:  Clinically Stable    MRI Progression:  Stable  Plan:     DMT:  No change in management    DMT comment:  Continue Ocrevus and vitamin D supplementation   He is aware of the risks associated with immunosuppressant therapy, including increased risk of infection.       Symptom Management:  Implement change in symptom management    Implement Change in Symptom Management:  Sleep and Pain (will discontinue Elavil and start Cymbalta to help with the neuropathy)       Had an  extensive conversation regarding how stress, emotional and physical, can effect MS.  Reassured the patient that being in these situations that he is in is not going to cause true relapses, but will exacerbate MS symptoms like he is currently experiencing.  His current work style my not be feasible for the long term.  Patient is weighing options that he has for work.  His currently job makes him and his family financially comfortable which is a big reason he is wanting to stay.   Will reach out to social work to ask for help with options for work.   Labs in March   MRIs in 1 year  Follow up with Dr. Ramesh in 6 months   Plan discussed and questions were answered to satisfaction.     Problem List Items Addressed This Visit          Neuro    Multiple sclerosis - Primary    Relevant Medications    DULoxetine (CYMBALTA) 30 MG capsule     Other Visit Diagnoses       Neuropathic pain        Relevant Medications    gabapentin (NEURONTIN) 300 MG capsule    gabapentin (NEURONTIN) 600 MG tablet    DULoxetine (CYMBALTA) 30 MG capsule    Vitamin D deficiency        Relevant Medications    cholecalciferol, vitamin D3, 1,250 mcg (50,000 unit) capsule            I spent a total of 90 minutes on the day of the visit.This includes face to face time and non-face to face time preparing to see the patient (eg, review of tests), obtaining and/or reviewing separately obtained history, documenting clinical information in the electronic or other health record, independently interpreting results and communicating results to the patient/family/caregiver, or care coordinator.       KRYSTAL Crespo

## 2023-09-21 NOTE — Clinical Note
Can one of you help him with options. 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 He would like to know the options he has as far as STD/LTD/FMLA and/or how to figure out who he needs to talk to at work to see about all this.  He is to the point of getting a different job, which I told him may, overall, be the best option, if that really is an option for him.  I did let him know that it may take 2 weeks to get to you which he was understanding and ok with since work is somewhat starting to slow down.   Also, he does have cooling vest, that only melt and become a weighted vest after about 5 minutes of doing the work that he is doing.  It sounds miserable.   Thank you!!

## 2023-10-04 ENCOUNTER — TELEPHONE (OUTPATIENT)
Dept: NEUROLOGY | Facility: CLINIC | Age: 28
End: 2023-10-04
Payer: COMMERCIAL

## 2023-10-04 DIAGNOSIS — Q89.2: Primary | ICD-10-CM

## 2023-10-04 NOTE — TELEPHONE ENCOUNTER
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 Back in March 2020, he had a pituitary MRI, and neurosurgery recommended some labs and possibly an appt with endo. Doesn't look like these were ever done. JF, would you just have him follow back up with PCP now, or go ahead and get labs to explore? If so, what labs would you recommend?       
ABDOMINAL PAIN/NAUSEA/VOMITING

## 2023-10-09 ENCOUNTER — TELEPHONE (OUTPATIENT)
Dept: PSYCHIATRY | Facility: CLINIC | Age: 28
End: 2023-10-09
Payer: COMMERCIAL

## 2023-10-09 NOTE — TELEPHONE ENCOUNTER
Phoned pt and  to discuss workplace benefits, but pt was not home yet.  Will call Wednesday at 6:30pm.

## 2023-10-09 NOTE — TELEPHONE ENCOUNTER
"SW received referral from Viky Solano NP to assist pt with possible workplace benefits and accommodations.  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 He would like to know the options he has as far as STD/LTD/FMLA and/or how to figure out who he needs to talk to at work to see about all this.  He is to the point of getting a different job, which I told him may, overall, be the best option, if that really is an option for him.  I did let him know that it may take 2 weeks to get to you which he was understanding and ok with since work is somewhat starting to slow down. Also, he does have cooling vest, that only melt and become a weighted vest after about 5 minutes of doing the work that he is doing."    Wife answered and explained best time to call back is 6:30pm.   "

## 2023-10-16 ENCOUNTER — TELEPHONE (OUTPATIENT)
Dept: ENDOCRINOLOGY | Facility: CLINIC | Age: 28
End: 2023-10-16
Payer: COMMERCIAL

## 2023-10-16 NOTE — TELEPHONE ENCOUNTER
LVM regarding scheduled appointment also mailed letter as a reminder.                ----- Message from Sun Gutierrez sent at 10/16/2023 12:10 PM CDT -----  Regarding: appt  Contact: Page, spouse @[phone]  Caller requesting NP appt type with referral on file. Attempted to schedule, no access. Pls call to discuss.

## 2023-10-17 NOTE — TELEPHONE ENCOUNTER
"Spoke with pt and wife by phone.  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  Employer not covered under FMLA and may not be required to provided ADA accommodations either.  Does have ST and LTD plans through employer, but feels it would "look bad" to take disability when he has another job planned for February 2024 that will be in AC conditions and with better shifts. New job is union job with more benefits.  He will explore all benefits but expresses reluctance to use any because of mentality of employers/colleagues.  Encouraged pt to call SW with future needs.   "

## 2023-10-20 ENCOUNTER — PATIENT MESSAGE (OUTPATIENT)
Dept: NEUROLOGY | Facility: CLINIC | Age: 28
End: 2023-10-20
Payer: COMMERCIAL

## 2023-12-11 DIAGNOSIS — N52.9 ERECTILE DYSFUNCTION, UNSPECIFIED ERECTILE DYSFUNCTION TYPE: ICD-10-CM

## 2023-12-12 RX ORDER — SILDENAFIL 50 MG/1
TABLET, FILM COATED ORAL
Qty: 15 TABLET | Refills: 1 | Status: SHIPPED | OUTPATIENT
Start: 2023-12-12 | End: 2024-03-27 | Stop reason: SDUPTHER

## 2024-01-12 ENCOUNTER — TELEPHONE (OUTPATIENT)
Dept: ENDOCRINOLOGY | Facility: CLINIC | Age: 29
End: 2024-01-12
Payer: COMMERCIAL

## 2024-01-16 ENCOUNTER — OFFICE VISIT (OUTPATIENT)
Dept: ENDOCRINOLOGY | Facility: CLINIC | Age: 29
End: 2024-01-16
Payer: COMMERCIAL

## 2024-01-16 DIAGNOSIS — Q89.2: Primary | ICD-10-CM

## 2024-01-16 PROCEDURE — 1160F RVW MEDS BY RX/DR IN RCRD: CPT | Mod: CPTII,95,, | Performed by: NURSE PRACTITIONER

## 2024-01-16 PROCEDURE — 1159F MED LIST DOCD IN RCRD: CPT | Mod: CPTII,95,, | Performed by: NURSE PRACTITIONER

## 2024-01-16 PROCEDURE — 99204 OFFICE O/P NEW MOD 45 MIN: CPT | Mod: 95,,, | Performed by: NURSE PRACTITIONER

## 2024-01-16 NOTE — Clinical Note
Fasting labs now Orders Placed This Encounter     Prolactin     TSH     T4, Free     ACTH     Insulin-Like Growth Factor     Cortisol, 8AM

## 2024-01-16 NOTE — ASSESSMENT & PLAN NOTE
-- Imaging -- see note.   -- Schedule 8am labs.  -- Will review case with Dr Mcgill in Pituitary clinic.  -- RTC after labs.

## 2024-01-16 NOTE — PROGRESS NOTES
Subjective:      Patient ID: Christiano Aguayo is a 28 y.o. male.    Chief Complaint:  No chief complaint on file.    History of Present Illness  Christiano Aguayo is here for evaluation and management of Pituitary Abnormality.  Referred by Dr Howe.  This is their first visit with me.    This is a MyChart video visit.    The patient location is: [place]  The chief complaint leading to consultation is: Pituitary  Visit type: Virtual visit with synchronous audio and video  Total time spent with patient: see below  Each patient to whom he or she provides medical services by telemedicine is:  (1) informed of the relationship between the physician and patient and the respective role of any other health care provider with respect to management of the patient; and (2) notified that he or she may decline to receive medical services by telemedicine and may withdraw from such care at any time.        MRI Brain  2/2023   Redemonstrated 5-6 mm focus of intrinsic T1 hyperintensity posterior to the infundibulum again suggestive of ectopic no hypothesis versus proteinaceous Rathke's cleft cyst or lipoma.  This is unchanged.  Midline structures are otherwise within normal limits.     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     MRI Pituitary  3/7/2020  1. 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  Differential considerations include a lipoma or proteinaceous Rathke's cleft cyst less likely.     Headaches or vision changes: reports history of headaches and vision change but not worse than usual - also notes vision changes are related to his MS (per patient)     Thyroid:  "DENIES-  Weight changes  Fatigue  BM changes  Palpitations  Tremor    Prolactin: DENIES-  Breast tenderness  Nipple discharge    Growth Hormone:  DENIES-  Change in ring/ shoe size  DM  REPORTS-  Joint pain    DI:  REPORTS (~ 6months)-  Polyuria  Nocturia - ~ 4 times   DENIES-  Polydipsia    Cortisol:  DENIES-  Easy bruising  Weight gain  Striae   DM  HTN  Acne  DENIES symptoms of adrenal insufficiency (no lightheadedness, N/V/abd pain, hypotension).  REPORTS-  Fractures -- L arm, ribs, collarbone, L foot, L hand    Gonadotrophs:  Reproductive history - 1 boy and another boy on the way   DENIES-  Decreased Libido  REPORTS-   ED but improved with Viagra       Review of Systems  As above      There were no vitals taken for this visit.    There is no height or weight on file to calculate BMI.    Lab Review:   No results found for: "HGBA1C"    Lab Results   Component Value Date    CHOL 191 04/19/2021    HDL 31 (L) 04/19/2021    LDLCALC 93.8 04/19/2021    TRIG 331 (H) 04/19/2021    CHOLHDL 16.2 (L) 04/19/2021     Lab Results   Component Value Date     09/15/2023    K 4.7 09/15/2023     09/15/2023    CO2 28 09/15/2023     09/15/2023    BUN 14 09/15/2023    CREATININE 0.9 09/15/2023    CALCIUM 9.7 09/15/2023    PROT 7.7 09/15/2023    ALBUMIN 4.1 09/15/2023    BILITOT 0.5 09/15/2023    ALKPHOS 38 (L) 09/15/2023    AST 24 09/15/2023    ALT 36 09/15/2023    ANIONGAP 7 (L) 09/15/2023    ESTGFRAFRICA >60.0 02/15/2022    EGFRNONAA >60.0 02/15/2022    TSH 1.720 04/19/2021     Vit D, 25-Hydroxy   Date Value Ref Range Status   03/10/2023 28 (L) 30 - 96 ng/mL Final     Comment:     Vitamin D deficiency.........<10 ng/mL                              Vitamin D insufficiency......10-29 ng/mL       Vitamin D sufficiency........> or equal to 30 ng/mL  Vitamin D toxicity............>100 ng/mL       Assessment and Plan     1. Congenital anomaly of pituitary gland  Prolactin    TSH    T4, Free    ACTH    Insulin-Like " Growth Factor    Cortisol, 8AM    Ambulatory referral/consult to Endocrinology          Congenital anomaly of pituitary gland  -- Imaging -- see note.   -- Schedule 8am labs.  -- Will review case with Dr Mcgill in Pituitary clinic.  -- RTC after labs.       Follow up in about 1 year (around 1/16/2025).        I spent 30 minutes face-to-face with the patient, over half of the visit was spent on counseling and/or coordinating the care of the patient.    Counseling includes:  Diagnostic results, impressions, recommendations   Prognosis   Risk and benefits of management/treatment options   Instructions for management treatment and or follow-up   Importance of compliance with management   Risk factor reduction   Patient education

## 2024-01-17 ENCOUNTER — TELEPHONE (OUTPATIENT)
Dept: ENDOCRINOLOGY | Facility: CLINIC | Age: 29
End: 2024-01-17
Payer: COMMERCIAL

## 2024-01-22 ENCOUNTER — PATIENT MESSAGE (OUTPATIENT)
Dept: PSYCHIATRY | Facility: CLINIC | Age: 29
End: 2024-01-22
Payer: COMMERCIAL

## 2024-01-26 ENCOUNTER — LAB VISIT (OUTPATIENT)
Dept: LAB | Facility: HOSPITAL | Age: 29
End: 2024-01-26
Attending: NURSE PRACTITIONER
Payer: COMMERCIAL

## 2024-01-26 DIAGNOSIS — Q89.2: ICD-10-CM

## 2024-01-26 LAB
CORTIS SERPL-MCNC: 9 UG/DL (ref 4.3–22.4)
PROLACTIN SERPL IA-MCNC: 10.5 NG/ML (ref 3.5–19.4)
T4 FREE SERPL-MCNC: 0.96 NG/DL (ref 0.71–1.51)
TSH SERPL DL<=0.005 MIU/L-ACNC: 1.58 UIU/ML (ref 0.4–4)

## 2024-01-26 PROCEDURE — 36415 COLL VENOUS BLD VENIPUNCTURE: CPT | Mod: PO | Performed by: NURSE PRACTITIONER

## 2024-01-26 PROCEDURE — 84146 ASSAY OF PROLACTIN: CPT | Performed by: NURSE PRACTITIONER

## 2024-01-26 PROCEDURE — 84305 ASSAY OF SOMATOMEDIN: CPT | Performed by: NURSE PRACTITIONER

## 2024-01-26 PROCEDURE — 84443 ASSAY THYROID STIM HORMONE: CPT | Performed by: NURSE PRACTITIONER

## 2024-01-26 PROCEDURE — 82533 TOTAL CORTISOL: CPT | Performed by: NURSE PRACTITIONER

## 2024-01-26 PROCEDURE — 82024 ASSAY OF ACTH: CPT | Performed by: NURSE PRACTITIONER

## 2024-01-26 PROCEDURE — 84439 ASSAY OF FREE THYROXINE: CPT | Performed by: NURSE PRACTITIONER

## 2024-01-30 LAB — ACTH PLAS-MCNC: 25 PG/ML (ref 0–46)

## 2024-02-01 LAB
IGF-I SERPL-MCNC: 134 NG/ML (ref 60–329)
IGF-I Z-SCORE SERPL: -0.52 SD

## 2024-02-07 ENCOUNTER — TELEPHONE (OUTPATIENT)
Dept: ENDOCRINOLOGY | Facility: CLINIC | Age: 29
End: 2024-02-07
Payer: COMMERCIAL

## 2024-02-07 ENCOUNTER — PATIENT MESSAGE (OUTPATIENT)
Dept: ENDOCRINOLOGY | Facility: CLINIC | Age: 29
End: 2024-02-07
Payer: COMMERCIAL

## 2024-02-07 DIAGNOSIS — Q89.2: Primary | ICD-10-CM

## 2024-02-07 NOTE — TELEPHONE ENCOUNTER
Component      Latest Ref Rng 1/26/2024   Somatomedin (IGF-I)      60 - 329 ng/mL 134    Z Score      -2.0 - 2.0 SD -0.52    Prolactin      3.5 - 19.4 ng/mL 10.5    TSH      0.400 - 4.000 uIU/mL 1.585    Free T4      0.71 - 1.51 ng/dL 0.96    ACTH      0 - 46 pg/mL 25    Cortisol, 8 AM      4.30 - 22.40 ug/dL 9.00

## 2024-02-13 ENCOUNTER — PATIENT MESSAGE (OUTPATIENT)
Dept: NEUROLOGY | Facility: CLINIC | Age: 29
End: 2024-02-13
Payer: COMMERCIAL

## 2024-03-18 DIAGNOSIS — M79.2 NEUROPATHIC PAIN: ICD-10-CM

## 2024-03-18 DIAGNOSIS — G35 MULTIPLE SCLEROSIS: ICD-10-CM

## 2024-03-21 RX ORDER — GABAPENTIN 600 MG/1
TABLET ORAL
Qty: 180 TABLET | Refills: 1 | Status: SHIPPED | OUTPATIENT
Start: 2024-03-21

## 2024-03-21 RX ORDER — DULOXETIN HYDROCHLORIDE 30 MG/1
CAPSULE, DELAYED RELEASE ORAL
Qty: 90 CAPSULE | Refills: 1 | Status: SHIPPED | OUTPATIENT
Start: 2024-03-21 | End: 2024-03-27 | Stop reason: SDUPTHER

## 2024-03-27 ENCOUNTER — OFFICE VISIT (OUTPATIENT)
Dept: NEUROLOGY | Facility: CLINIC | Age: 29
End: 2024-03-27
Payer: COMMERCIAL

## 2024-03-27 ENCOUNTER — LAB VISIT (OUTPATIENT)
Dept: LAB | Facility: HOSPITAL | Age: 29
End: 2024-03-27
Attending: STUDENT IN AN ORGANIZED HEALTH CARE EDUCATION/TRAINING PROGRAM
Payer: COMMERCIAL

## 2024-03-27 VITALS
BODY MASS INDEX: 45.1 KG/M2 | WEIGHT: 315 LBS | HEART RATE: 79 BPM | SYSTOLIC BLOOD PRESSURE: 127 MMHG | DIASTOLIC BLOOD PRESSURE: 85 MMHG | HEIGHT: 70 IN

## 2024-03-27 DIAGNOSIS — G35 MULTIPLE SCLEROSIS: ICD-10-CM

## 2024-03-27 DIAGNOSIS — N52.9 ERECTILE DYSFUNCTION, UNSPECIFIED ERECTILE DYSFUNCTION TYPE: ICD-10-CM

## 2024-03-27 DIAGNOSIS — G35 MULTIPLE SCLEROSIS: Primary | ICD-10-CM

## 2024-03-27 DIAGNOSIS — M79.2 NEUROPATHIC PAIN: ICD-10-CM

## 2024-03-27 DIAGNOSIS — E55.9 VITAMIN D DEFICIENCY: ICD-10-CM

## 2024-03-27 LAB
25(OH)D3+25(OH)D2 SERPL-MCNC: 53 NG/ML (ref 30–96)
ALBUMIN SERPL BCP-MCNC: 4.1 G/DL (ref 3.5–5.2)
ALP SERPL-CCNC: 39 U/L (ref 55–135)
ALT SERPL W/O P-5'-P-CCNC: 44 U/L (ref 10–44)
ANION GAP SERPL CALC-SCNC: 5 MMOL/L (ref 8–16)
AST SERPL-CCNC: 27 U/L (ref 10–40)
BASOPHILS # BLD AUTO: 0.06 K/UL (ref 0–0.2)
BASOPHILS NFR BLD: 0.5 % (ref 0–1.9)
BILIRUB SERPL-MCNC: 0.5 MG/DL (ref 0.1–1)
BUN SERPL-MCNC: 14 MG/DL (ref 6–20)
CALCIUM SERPL-MCNC: 9.7 MG/DL (ref 8.7–10.5)
CHLORIDE SERPL-SCNC: 109 MMOL/L (ref 95–110)
CO2 SERPL-SCNC: 24 MMOL/L (ref 23–29)
CREAT SERPL-MCNC: 0.9 MG/DL (ref 0.5–1.4)
DIFFERENTIAL METHOD BLD: ABNORMAL
EOSINOPHIL # BLD AUTO: 0.2 K/UL (ref 0–0.5)
EOSINOPHIL NFR BLD: 1.4 % (ref 0–8)
ERYTHROCYTE [DISTWIDTH] IN BLOOD BY AUTOMATED COUNT: 12.8 % (ref 11.5–14.5)
EST. GFR  (NO RACE VARIABLE): >60 ML/MIN/1.73 M^2
GLUCOSE SERPL-MCNC: 103 MG/DL (ref 70–110)
HBV CORE AB SERPL QL IA: NORMAL
HBV SURFACE AG SERPL QL IA: NORMAL
HCT VFR BLD AUTO: 44.3 % (ref 40–54)
HGB BLD-MCNC: 14.8 G/DL (ref 14–18)
IGA SERPL-MCNC: 166 MG/DL (ref 40–350)
IGG SERPL-MCNC: 1149 MG/DL (ref 650–1600)
IGM SERPL-MCNC: 27 MG/DL (ref 50–300)
IMM GRANULOCYTES # BLD AUTO: 0.06 K/UL (ref 0–0.04)
IMM GRANULOCYTES NFR BLD AUTO: 0.5 % (ref 0–0.5)
LYMPHOCYTES # BLD AUTO: 2.9 K/UL (ref 1–4.8)
LYMPHOCYTES NFR BLD: 22.3 % (ref 18–48)
MCH RBC QN AUTO: 29.1 PG (ref 27–31)
MCHC RBC AUTO-ENTMCNC: 33.4 G/DL (ref 32–36)
MCV RBC AUTO: 87 FL (ref 82–98)
MONOCYTES # BLD AUTO: 0.9 K/UL (ref 0.3–1)
MONOCYTES NFR BLD: 6.7 % (ref 4–15)
NEUTROPHILS # BLD AUTO: 9 K/UL (ref 1.8–7.7)
NEUTROPHILS NFR BLD: 68.6 % (ref 38–73)
NRBC BLD-RTO: 0 /100 WBC
PLATELET # BLD AUTO: 305 K/UL (ref 150–450)
PMV BLD AUTO: 10.1 FL (ref 9.2–12.9)
POTASSIUM SERPL-SCNC: 4.5 MMOL/L (ref 3.5–5.1)
PROT SERPL-MCNC: 7.2 G/DL (ref 6–8.4)
RBC # BLD AUTO: 5.09 M/UL (ref 4.6–6.2)
SODIUM SERPL-SCNC: 138 MMOL/L (ref 136–145)
WBC # BLD AUTO: 13.08 K/UL (ref 3.9–12.7)

## 2024-03-27 PROCEDURE — 0361U NEURFLMNT LT CHN DIG IA QUAN: CPT | Performed by: STUDENT IN AN ORGANIZED HEALTH CARE EDUCATION/TRAINING PROGRAM

## 2024-03-27 PROCEDURE — 82306 VITAMIN D 25 HYDROXY: CPT | Performed by: STUDENT IN AN ORGANIZED HEALTH CARE EDUCATION/TRAINING PROGRAM

## 2024-03-27 PROCEDURE — 99999 PR PBB SHADOW E&M-EST. PATIENT-LVL III: CPT | Mod: PBBFAC,,, | Performed by: STUDENT IN AN ORGANIZED HEALTH CARE EDUCATION/TRAINING PROGRAM

## 2024-03-27 PROCEDURE — 3079F DIAST BP 80-89 MM HG: CPT | Mod: CPTII,S$GLB,, | Performed by: STUDENT IN AN ORGANIZED HEALTH CARE EDUCATION/TRAINING PROGRAM

## 2024-03-27 PROCEDURE — 85025 COMPLETE CBC W/AUTO DIFF WBC: CPT | Performed by: STUDENT IN AN ORGANIZED HEALTH CARE EDUCATION/TRAINING PROGRAM

## 2024-03-27 PROCEDURE — 36415 COLL VENOUS BLD VENIPUNCTURE: CPT | Performed by: STUDENT IN AN ORGANIZED HEALTH CARE EDUCATION/TRAINING PROGRAM

## 2024-03-27 PROCEDURE — G2211 COMPLEX E/M VISIT ADD ON: HCPCS | Mod: S$GLB,,, | Performed by: STUDENT IN AN ORGANIZED HEALTH CARE EDUCATION/TRAINING PROGRAM

## 2024-03-27 PROCEDURE — 80053 COMPREHEN METABOLIC PANEL: CPT | Performed by: STUDENT IN AN ORGANIZED HEALTH CARE EDUCATION/TRAINING PROGRAM

## 2024-03-27 PROCEDURE — 3008F BODY MASS INDEX DOCD: CPT | Mod: CPTII,S$GLB,, | Performed by: STUDENT IN AN ORGANIZED HEALTH CARE EDUCATION/TRAINING PROGRAM

## 2024-03-27 PROCEDURE — 82784 ASSAY IGA/IGD/IGG/IGM EACH: CPT | Mod: 59 | Performed by: STUDENT IN AN ORGANIZED HEALTH CARE EDUCATION/TRAINING PROGRAM

## 2024-03-27 PROCEDURE — 87340 HEPATITIS B SURFACE AG IA: CPT | Performed by: STUDENT IN AN ORGANIZED HEALTH CARE EDUCATION/TRAINING PROGRAM

## 2024-03-27 PROCEDURE — 86704 HEP B CORE ANTIBODY TOTAL: CPT | Performed by: STUDENT IN AN ORGANIZED HEALTH CARE EDUCATION/TRAINING PROGRAM

## 2024-03-27 PROCEDURE — 1159F MED LIST DOCD IN RCRD: CPT | Mod: CPTII,S$GLB,, | Performed by: STUDENT IN AN ORGANIZED HEALTH CARE EDUCATION/TRAINING PROGRAM

## 2024-03-27 PROCEDURE — 99215 OFFICE O/P EST HI 40 MIN: CPT | Mod: S$GLB,,, | Performed by: STUDENT IN AN ORGANIZED HEALTH CARE EDUCATION/TRAINING PROGRAM

## 2024-03-27 PROCEDURE — 3074F SYST BP LT 130 MM HG: CPT | Mod: CPTII,S$GLB,, | Performed by: STUDENT IN AN ORGANIZED HEALTH CARE EDUCATION/TRAINING PROGRAM

## 2024-03-27 RX ORDER — SILDENAFIL 50 MG/1
50 TABLET, FILM COATED ORAL DAILY PRN
Qty: 90 TABLET | Refills: 2 | Status: SHIPPED | OUTPATIENT
Start: 2024-03-27 | End: 2024-03-27

## 2024-03-27 RX ORDER — DULOXETIN HYDROCHLORIDE 30 MG/1
30 CAPSULE, DELAYED RELEASE ORAL DAILY
Qty: 90 CAPSULE | Refills: 1 | Status: SHIPPED | OUTPATIENT
Start: 2024-03-27 | End: 2024-03-27

## 2024-03-27 RX ORDER — SILDENAFIL 50 MG/1
50 TABLET, FILM COATED ORAL DAILY PRN
Qty: 90 TABLET | Refills: 2 | Status: SHIPPED | OUTPATIENT
Start: 2024-03-27

## 2024-03-27 RX ORDER — DULOXETIN HYDROCHLORIDE 30 MG/1
30 CAPSULE, DELAYED RELEASE ORAL DAILY
Qty: 90 CAPSULE | Refills: 1 | Status: SHIPPED | OUTPATIENT
Start: 2024-03-27 | End: 2024-09-23

## 2024-03-27 NOTE — PROGRESS NOTES
[facility]  Follow Up Patient Visit      Disease Summary     Principle neurological diagnosis: MS    Date of symptom onset: Nov 2019  Date of diagnosis: May 2021  Disease type at diagnosis: RR  Disease type currently: RR  Previous therapy: NA  Current therapy: Ocrevus 9/2021 - present  Vitamin D Dose: 50,000IU weekly   Last MRI Brain: 9/15/2023 - stable   Last MRI C-spine: 9/15/28289 - stable  Last MRI T-spine: 5/28/21 - one lesion   CSF: NA  Other relevant labs and tests: NMO and MOG Ab 5/2021 neg  JCV:   Lab Results   Component Value Date    JCVINDEX 0.20 (H) 05/20/2021    JCVANTIBODY INDETERMINATE (A) 05/20/2021     Vit D:   Lab Results   Component Value Date    WIBLMVDT27HV 28 (L) 03/10/2023    OHCCIPRP12CE 16 (L) 04/19/2021       Interval history:     He underwent pituitary evaluation by endrocrinology, so far labs are unremarkable.     He's staying at the same job. He's still exposed to a lot of heat. He has a cooling vest to wear during work.     Work is less stressful now that he has helpful.     His left foot has been giving him issues - arch pain when walking, and worse at night    Cymbalta helps with mood, he feels more calm, and sleep well. He takes gabapentin 1200mg at night Mon-Fri, 1500mg on weekends. No daytime drowsiness.     He had a left ear infection in Feb, that improved with steroids. No concerns with Ocrevus.   He has mild depth perception noticed when hunting.   He has persistent lateral hand numbness. No weakness.     He has another baby on the way.     ROS:     SOCIAL HISTORY  Living arrangements - the patient lives with their family.  Social History     Socioeconomic History    Marital status:    Tobacco Use    Smoking status: Never    Smokeless tobacco: Current     Types: Snuff   Substance and Sexual Activity    Alcohol use: Yes     Comment: rarely    Drug use: Never    Sexual activity: Yes     Partners: Female     Birth control/protection: None      Social Determinants of Health     Financial Resource Strain: Low Risk  (1/16/2024)    Overall Financial Resource Strain (CARDIA)     Difficulty of Paying Living Expenses: Not hard at all   Food Insecurity: No Food Insecurity (1/16/2024)    Hunger Vital Sign     Worried About Running Out of Food in the Last Year: Never true     Ran Out of Food in the Last Year: Never true   Transportation Needs: No Transportation Needs (1/16/2024)    PRAPARE - Transportation     Lack of Transportation (Medical): No     Lack of Transportation (Non-Medical): No   Physical Activity: Insufficiently Active (1/16/2024)    Exercise Vital Sign     Days of Exercise per Week: 1 day     Minutes of Exercise per Session: 30 min   Stress: Stress Concern Present (1/16/2024)    [employer] - Occupational Stress Questionnaire     Feeling of Stress : To some extent   Social Connections: Unknown (1/16/2024)    Social Connection and Isolation Panel [NHANES]     Frequency of Communication with Friends and Family: More than three times a week     Frequency of Social Gatherings with Friends and Family: More than three times a week     Active Member of Clubs or Organizations: No     Attends Club or Organization Meetings: Never     Marital Status:    Housing Stability: Low Risk  (1/16/2024)    Housing Stability Vital Sign     Unable to Pay for Housing in the Last Year: No     Number of Places Lived in the Last Year: 1     Unstable Housing in the Last Year: No       Patient Employment       Status   Full Time    Employer   [employer]  (OTHER)    Address   ,                    9/21/2023     8:52 AM   REVIEW OF SYMPTOMS   Do you feel abnormally tired on most days? Yes   Do you feel you generally sleep well? No   Do you have difficulty controlling your bladder?  No   Do you have difficulty controlling your bowels?  Yes   Do you have frequent muscle cramps, tightness or spasms in your limbs?  Yes   Do  you have new visual symptoms?  No   Do you have worsening difficulty with your memory or thinking? No   Do you have worsening symptoms of anxiety or depression?  Yes   For patients who walk, Do you have more difficulty walking?  No   Have you fallen since your last visit?  No   For patients who use wheelchairs: Do you have any skin wounds or breakdown? Not Applicable   Do you have difficulty using your hands?  Yes   Do you have shooting or burning pain? Yes   Do you have difficulty with sexual function?  Yes   If you are sexually active, are you using birth control? Y/N  N/A No   Do you often choke when swallowing liquids or solid food?  No   Do you experience worsening symptoms when overheated? Yes   Do you need any new equipment such as a wheelchair, walker or shower chair? No   Do you receive co-pay financial assistance for your principal MS medicine? No   Would you be interested in participating in an MS research trial in the future? No   For patients on Gilenya, Tecfidera, Aubagio, Rituxan, Ocrevus, Tysabri, Lemtrada or Methotrexate, are you aware that you should NOT receive live virus vaccines?  Yes   Do you feel you have adequate family/friend support?  No   Do you have health insurance?   Yes   Are you currently employed? Yes   Do you receive SSDI/SSI?  Not Applicable   Do you use marijuana or cannabis products? No   Have you been diagnosed with a urinary tract infection since your last visit here? No   Have you been diagnosed with a respiratory tract infection since your last visit here? No   Have you been to the emergency room since your last visit here? No   Have you been hospitalized since your last visit here?  No         9/21/2023     9:00 AM   FSS SCORE & INTERPRETATION   FSS SCORE  43   FSS SCORE INTERPRETATION May be suffering from fatigue         9/21/2023     8:58 AM   MS CANDICE-D SCORE & INTERPRETATION   CANDICE-D SCORE  32   CANDICE-D INTERPRETATION  Possibility of major Depression         9/21/2023      8:54 AM   MS GEORGINA-7 SCORE & INTERPRETATION   GEORGINA-7 SCORE  16   GEORGINA-7 SCORE INTERPRETATION Severe Anxiety         9/21/2023     9:01 AM   PEQ MS MOS PAIN EFFECTS SCORE & INTERPRETATION   PES SCORE 24   PES SCORE INTERPRETATION Scores can range from 6-30.  Items are scaled so that higher scores indicate a greater impact of pain on a patients mood and behavior.         9/21/2023     9:03 AM   PEQ MS SEXUAL SATISFACTION SCORE & INTERPRETATION   SSS SCORE  14   SSS SCORE INTERPRETATION Scores can range from 4-24.  Higher scores indicate greater problems with sexual satisfaction.         9/21/2023     9:04 AM   MS BLADDER CONTROL SCORE & INTERPRETATION   BLCS SCORE 5   BLCS SCORE INTERPRETATION  Scores can range from 0-22, with higher scores indicating greater bladder control problems.         9/21/2023     9:09 AM   MS BOWEL CONTROL SCORE & INTERPRETATION   BWCS SCORE 7   BWCS SCORE INTERPRETATION Scores can range from 0-26, with higher scores indicating greater bowel control problems.         9/21/2023     9:05 AM   PEQ MS IMPACT OF VISUAL IMPAIRMENT SCORE & INTERPRETATION   ESTHER SCALE SCORE  0   ESTHER SCORE INTERPRETATION Scores can range from 0-15, with higher scores indicating greater impact of visual problems on daily activites.         9/21/2023     9:08 AM   MS PDQ SCORE & INTERPRETATION   PDQ RETROSPECTIVE MEMORY SUBSCALE 0   PDQ ATTENTION/CONCENTRATION SUBSCALE 3   PDQ PROSPECTIVE MEMORY SUBSCALE 0   PDQ PLANNING/ORGANIZATION SUBSCALE 5   PDQ TOTAL SCORE 8   PDQ SCORE INTERPRETATION Scores can range from 0-80, with higher scores indicating greater perceived cognitive impairment.         9/21/2023     9:12 AM   MSSS SCORE & INTERPRETATION   MSSS TANGIBLE SUPPORT SUBSCALE 62.5   MSSS EMOTIONAL/INFORMATIONAL SUPPORT SUBSCALE 0   MSSS AFFECTIONATE SUPPORT SUBSCALE 50   MSSS POSITIVE SOCIAL INTERACTION SUBSCALE 33.33   MSSS TOTAL SCORE 36.46   MSSS SCORE INTERPRETATION Scores can range from 0-100, with higher scores  "indicating greater perceived support.         Exam:     Vitals:    03/27/24 0917   BP: 127/85   Pulse: 79   Weight: (!) 143.1 kg (315 lb 7.7 oz)   Height: 5' 10" (1.778 m)          In general, the patient is well nourished and appears to be in no acute distress.    MENTAL STATUS: language is fluent, normal verbal comprehension, attention is normal, patient is alert and oriented, fund of knowlege is appropriate by vocabulary. Behavior and judgment appropriate.     CRANIAL NERVE EXAM:  There is no intrernuclear ophthalmoplegia.  Extraocular muscles are intact. No facial asymmetry. Facial sensation is intact bilaterally. There is no dysarthria. Uvula is midline, and palate moves symmetrically. Shoulder shrug intact bilaterlly. Tongue protrusion is midline. Hearing is intact to finger rub bilaterally. Neck is supple with full ROM    MOTOR EXAM: Normal bulk and tone throughout UE and LE bilaterally.   No pronator drift; rapid sequential movements are normal; Strength is  5/5 in all groups in the lower extremities and upper extremities    SENSORY EXAM: Normal to light touch throughout.    COORDINATION: Normal finger-to-nose exam. Normal heel-to-shin exam.    GAIT: Narrow based and stable.     Negative Romberg's        3/10/2023    12:01 AM 9/21/2023    12:01 AM 3/27/2024    12:01 AM   Timed 25 Foot Walk:   Did patient wear an AFO? No No No   Was assistive device used? No No No   Time for 25 Foot Walk (seconds) 3.95 3.8 3.95   Time for 25 Foot Walk (seconds) 4.05 3.77 4.03         Imaging (personally reviewed):     Results for orders placed during the hospital encounter of 09/15/23    MRI Brain Demyelinating Without Contrast    Impression  BRAIN:    1. Significantly motion degraded exam demonstrates no gross interval change again with findings that would be consistent with patient's clinical history of an underlying demyelinating process with mild plaque burden.  No acute process.  2. Redemonstrated presumed ectopic " neurohypophysis with additional differential considerations discussed above, unchanged.  CERVICAL:    1. Significantly motion degraded exam demonstrates stable short-segment presumed demyelinating plaques of the upper cervical spine.  No acute process.  2. Degenerative changes at C3-C4 and C4-C5 are stable from the prior exam most notable for foraminal narrowing discussed above.      Electronically signed by: Fabricio Lala  Date:    09/15/2023  Time:    10:17    Results for orders placed during the hospital encounter of 09/15/23    MRI Cervical Spine Demyelinating Without Contrast    Impression  BRAIN:    1. Significantly motion degraded exam demonstrates no gross interval change again with findings that would be consistent with patient's clinical history of an underlying demyelinating process with mild plaque burden.  No acute process.  2. 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  CERVICAL:    1. Significantly motion degraded exam demonstrates stable short-segment presumed demyelinating plaques of the upper cervical spine.  No acute process.  2. Degenerative changes at C3-C4 and C4-C5 are stable from the prior exam most notable for foraminal narrowing discussed above.      Electronically signed by: Fabricio Lala  Date:    09/15/2023  Time:    10:17    No results found for this or any previous visit.    Results for orders placed during the hospital encounter of 02/15/22    MRI Brain Demyelinating W W/O Contrast    Impression  1. Stable appearance of the brain with few foci of FLAIR and T2 hyperintense signal in the cerebral white matter is well as the brainstem.  There is a provided history of multiple sclerosis.  These findings would be consistent with epic provided diagnosis.  There are no new regions of signal abnormality in the brain.  There are no regions of restricted diffusion or abnormal enhancement to suggest interval progression of  disease or active demyelination.  2. 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      Electronically signed by: Hossein Reyes MD  Date:    02/15/2022  Time:    07:40    Results for orders placed during the hospital encounter of 02/15/22    MRI Cervical Spine Demyelinating W W/O Contrast    Impression  1. Patient motion does limit evaluation.  There are regions of abnormal signal intensity in the cord.  These were present previously.  A lesion in the posterior cord at the level of C1-2 through superior C3 was present previously but appears slightly larger on the current study.  This may reflect mild interval progression of disease but there is no abnormal enhancement to suggest active demyelination.  Similarly, a lesion previously seen in the left posterolateral cord at the level of C4 on C5 is without definite change.  Smaller lesions at the level of C3 or likely present on the most recent prior study but patient motion does limit evaluation.  2. There is no spinal stenosis or cord compression but foraminal narrowing at several levels due mostly due to uncovertebral spurring is without change and described above.      Electronically signed by: Hossein Reyes MD  Date:    02/15/2022  Time:    07:50    Results for orders placed during the hospital encounter of 05/28/21    MRI Thoracic Spine Demyelinating W W/O Contrast    Impression  1. There is a similar appearance of the thoracic spine and cord when compared to the prior study.  The images are degraded by patient body habitus and motion.  There is no fracture or malalignment.  The spinal canal is somewhat small on a developmental basis and there is prominent dorsal epidural fat (epidural lipomatosis).  These findings are unchanged.  There is no significant spinal canal or foraminal  stenosis.  2. There is suggestion of abnormal T2 hyperintense signal in the left lateral cord at the level of T12.  This is better demonstrated and is more conspicuous on the comparison study.  There is no other definite region of signal abnormality in the thoracic cord.  There is no abnormal enhancement.      Electronically signed by: Hossein Reyes MD  Date:    05/28/2021  Time:    08:50      Labs:     Lab Results   Component Value Date    ZAZLYZZT68SI 28 (L) 03/10/2023    AJOLHOKS07YY 16 (L) 04/19/2021     Lab Results   Component Value Date    JCVINDEX 0.20 (H) 05/20/2021    JCVANTIBODY INDETERMINATE (A) 05/20/2021     Lab Results   Component Value Date    YT7UDBXY 71.2 05/20/2021    ABSOLUTECD3 2308 (H) 05/20/2021    KO5RVCQN 16.0 05/20/2021    ABSOLUTECD8 519 05/20/2021    MF7ZHPOL 50.7 05/20/2021    ABSOLUTECD4 1643 (H) 05/20/2021    LABCD48 3.16 05/20/2021     Lab Results   Component Value Date    WBC 10.53 09/15/2023    RBC 5.27 09/15/2023    HGB 15.0 09/15/2023    HCT 44.2 09/15/2023    MCV 84 09/15/2023    MCH 28.5 09/15/2023    MCHC 33.9 09/15/2023    RDW 12.9 09/15/2023     09/15/2023    MPV 10.7 09/15/2023    GRAN 7.3 09/15/2023    GRAN 69.3 09/15/2023    LYMPH 2.1 09/15/2023    LYMPH 19.8 09/15/2023    MONO 0.9 09/15/2023    MONO 8.1 09/15/2023    EOS 0.2 09/15/2023    BASO 0.04 09/15/2023    EOSINOPHIL 2.1 09/15/2023    BASOPHIL 0.4 09/15/2023     Sodium   Date Value Ref Range Status   09/15/2023 139 136 - 145 mmol/L Final     Potassium   Date Value Ref Range Status   09/15/2023 4.7 3.5 - 5.1 mmol/L Final     Chloride   Date Value Ref Range Status   09/15/2023 104 95 - 110 mmol/L Final     CO2   Date Value Ref Range Status   09/15/2023 28 23 - 29 mmol/L Final     Glucose   Date Value Ref Range Status   09/15/2023 103 70 - 110 mg/dL Final     BUN   Date Value Ref Range Status   09/15/2023 14 6 - 20 mg/dL Final     Creatinine   Date Value Ref Range Status   09/15/2023 0.9 0.5 - 1.4 mg/dL Final      Calcium   Date Value Ref Range Status   09/15/2023 9.7 8.7 - 10.5 mg/dL Final     Total Protein   Date Value Ref Range Status   09/15/2023 7.7 6.0 - 8.4 g/dL Final     Albumin   Date Value Ref Range Status   09/15/2023 4.1 3.5 - 5.2 g/dL Final     Total Bilirubin   Date Value Ref Range Status   09/15/2023 0.5 0.1 - 1.0 mg/dL Final     Comment:     For infants and newborns, interpretation of results should be based  on gestational age, weight and in agreement with clinical  observations.    Premature Infant recommended reference ranges:  Up to 24 hours.............<8.0 mg/dL  Up to 48 hours............<12.0 mg/dL  3-5 days..................<15.0 mg/dL  6-29 days.................<15.0 mg/dL       Alkaline Phosphatase   Date Value Ref Range Status   09/15/2023 38 (L) 55 - 135 U/L Final     AST   Date Value Ref Range Status   09/15/2023 24 10 - 40 U/L Final     ALT   Date Value Ref Range Status   09/15/2023 36 10 - 44 U/L Final     Anion Gap   Date Value Ref Range Status   09/15/2023 7 (L) 8 - 16 mmol/L Final     eGFR if    Date Value Ref Range Status   02/15/2022 >60.0 >60 mL/min/1.73 m^2 Final     eGFR if non    Date Value Ref Range Status   02/15/2022 >60.0 >60 mL/min/1.73 m^2 Final     Comment:     Calculation used to obtain the estimated glomerular filtration  rate (eGFR) is the CKD-EPI equation.          Diagnosis/Assessment/Plan:     Multiple Sclerosis  -Assessment: RRMS stable on Ocrevus. Exam stable. 1 episode of ear infection that resolved. No there concerns with therapy.   -Imaging: Update in Sept 2024  -Disease Modifying Therapies: Continue Ocrevus, safety labs today  Symptom management   -Podatiry referral if foot pain becomes worse, may be plantar fasciitis   -Continue Cymblata for mood, he is less angry and fewer outbursts on Cymbalta   -Continue gabapentin 1200mg Qhs weekdays and 1500mg Qhs weekends   -Vitamin D weekly supplement  Plan discussed and questions were  answered to satisfaction.  Return to clinic in 6 months with CB.        NEURO MULTIPLE SCLEROSIS IMPRESSION:   Number of relapses in the past year?:  0  Clinical Progression:  Clinically Stable  MRI Progression:  Stable  MS Classification:  Relapsing-Remitting MS  DMT:  No change in management  Symptom Management:  No change in symptom management  Supplements:  Vit D        Total time spent with the patient: 59 minutes, including face to face consultation, chart review and coordination of care, on the day of the visit. This includes face to face time and non-face to face time preparing to see the patient (eg, review of tests), obtaining and/or reviewing separately obtained history, documenting clinical information in the electronic or other health record, independently interpreting results and communicating results to the patient/family/caregiver, or care coordination.   I performed a neurobehavioral status examination that included a clinical assessment of thinking, reasoning, and judgment. Please see above HPI and ROS for full details.       Glo Ramesh MD, MSc  Attending neurologist

## 2024-03-27 NOTE — PATIENT INSTRUCTIONS
Referral to Podiatry possibly if foot pain becomes worse     Make an account at [employer] with [email or web address]

## 2024-04-01 LAB — NEUROFILAMENT LIGHT CHAIN, PLASMA: <3 PG/ML

## 2024-04-02 ENCOUNTER — TELEPHONE (OUTPATIENT)
Dept: NEUROLOGY | Facility: CLINIC | Age: 29
End: 2024-04-02
Payer: COMMERCIAL

## 2024-04-02 NOTE — TELEPHONE ENCOUNTER
"Notified via email from [facility] that pt's Ocrevus has been denied. Prepared and faxed appeal to [facility] [place], Attn: Appeal Coordinator at [phone]. Notified [facility]    Your fax has been successfully sent to [phone] at [phone].  ------------------------------------------------------------  From: [id number]  ------------------------------------------------------------  4/2/2024 12:10:31 PM Transmission Record   Sent to [phone] with remote ID "[phone]  [phone]"   Result: (0/339;0/0) Success   Page record: 1 - 27   Elapsed time: 09:08 on channel 10    "

## 2024-04-04 NOTE — TELEPHONE ENCOUNTER
Spoke with pt, provided recent WBC results. He confirmed he has had no recent infections. Per silke Trejo to proceed with this infusion.

## 2024-04-04 NOTE — TELEPHONE ENCOUNTER
----- Message from Miguel Young PharmD sent at 4/3/2024  3:27 PM CDT -----  Regarding: FW: Approval for Ocrevus  Contact: Connie @ [phone]    ----- Message -----  From: Jen De La Rosa RN  Sent: 4/3/2024   3:17 PM CDT  To: Miguel Young PharmD  Subject: FW: Approval for Ocrevus                           ----- Message -----  From: Tammy Zarate  Sent: 4/3/2024   9:23 AM CDT  To: Gadiel [facility] Staff  Subject: Approval for Ocrevus                             Connie calling with [employer] is advising rx Ocrevus has been approved. Caller states the letter should be received within 10 business days. Thanks

## 2024-05-02 ENCOUNTER — PATIENT MESSAGE (OUTPATIENT)
Dept: PSYCHIATRY | Facility: CLINIC | Age: 29
End: 2024-05-02
Payer: COMMERCIAL

## 2024-06-13 ENCOUNTER — DOCUMENTATION ONLY (OUTPATIENT)
Dept: NEUROLOGY | Facility: CLINIC | Age: 29
End: 2024-06-13
Payer: COMMERCIAL

## 2024-07-03 ENCOUNTER — PATIENT MESSAGE (OUTPATIENT)
Dept: NEUROLOGY | Facility: CLINIC | Age: 29
End: 2024-07-03
Payer: COMMERCIAL

## 2024-07-25 ENCOUNTER — PATIENT MESSAGE (OUTPATIENT)
Dept: PSYCHIATRY | Facility: CLINIC | Age: 29
End: 2024-07-25
Payer: COMMERCIAL

## 2024-08-05 ENCOUNTER — PATIENT MESSAGE (OUTPATIENT)
Dept: PSYCHIATRY | Facility: CLINIC | Age: 29
End: 2024-08-05
Payer: COMMERCIAL

## 2024-09-12 ENCOUNTER — TELEPHONE (OUTPATIENT)
Dept: NEUROLOGY | Facility: CLINIC | Age: 29
End: 2024-09-12
Payer: COMMERCIAL

## 2024-09-25 ENCOUNTER — LAB VISIT (OUTPATIENT)
Dept: LAB | Facility: HOSPITAL | Age: 29
End: 2024-09-25
Payer: COMMERCIAL

## 2024-09-25 ENCOUNTER — OFFICE VISIT (OUTPATIENT)
Dept: NEUROLOGY | Facility: CLINIC | Age: 29
End: 2024-09-25
Payer: COMMERCIAL

## 2024-09-25 VITALS
WEIGHT: 315 LBS | SYSTOLIC BLOOD PRESSURE: 130 MMHG | HEIGHT: 70 IN | HEART RATE: 83 BPM | DIASTOLIC BLOOD PRESSURE: 86 MMHG | BODY MASS INDEX: 45.1 KG/M2

## 2024-09-25 DIAGNOSIS — N32.81 OAB (OVERACTIVE BLADDER): ICD-10-CM

## 2024-09-25 DIAGNOSIS — G35 MULTIPLE SCLEROSIS: Primary | ICD-10-CM

## 2024-09-25 DIAGNOSIS — E55.9 VITAMIN D DEFICIENCY: ICD-10-CM

## 2024-09-25 DIAGNOSIS — M79.2 NEUROPATHIC PAIN: ICD-10-CM

## 2024-09-25 DIAGNOSIS — G35 MULTIPLE SCLEROSIS: ICD-10-CM

## 2024-09-25 DIAGNOSIS — N52.9 ERECTILE DYSFUNCTION, UNSPECIFIED ERECTILE DYSFUNCTION TYPE: ICD-10-CM

## 2024-09-25 LAB
ALBUMIN SERPL BCP-MCNC: 4.1 G/DL (ref 3.5–5.2)
ALP SERPL-CCNC: 45 U/L (ref 55–135)
ALT SERPL W/O P-5'-P-CCNC: 85 U/L (ref 10–44)
ANION GAP SERPL CALC-SCNC: 8 MMOL/L (ref 8–16)
AST SERPL-CCNC: 57 U/L (ref 10–40)
BASOPHILS # BLD AUTO: 0.1 K/UL (ref 0–0.2)
BASOPHILS NFR BLD: 0.8 % (ref 0–1.9)
BILIRUB SERPL-MCNC: 0.5 MG/DL (ref 0.1–1)
BUN SERPL-MCNC: 13 MG/DL (ref 6–20)
CALCIUM SERPL-MCNC: 9.8 MG/DL (ref 8.7–10.5)
CHLORIDE SERPL-SCNC: 104 MMOL/L (ref 95–110)
CO2 SERPL-SCNC: 25 MMOL/L (ref 23–29)
CREAT SERPL-MCNC: 0.9 MG/DL (ref 0.5–1.4)
DIFFERENTIAL METHOD BLD: ABNORMAL
EOSINOPHIL # BLD AUTO: 1.1 K/UL (ref 0–0.5)
EOSINOPHIL NFR BLD: 8.3 % (ref 0–8)
ERYTHROCYTE [DISTWIDTH] IN BLOOD BY AUTOMATED COUNT: 12.9 % (ref 11.5–14.5)
EST. GFR  (NO RACE VARIABLE): >60 ML/MIN/1.73 M^2
GLUCOSE SERPL-MCNC: 99 MG/DL (ref 70–110)
HBV CORE AB SERPL QL IA: NORMAL
HBV SURFACE AG SERPL QL IA: NORMAL
HCT VFR BLD AUTO: 45.1 % (ref 40–54)
HGB BLD-MCNC: 14.3 G/DL (ref 14–18)
IGA SERPL-MCNC: 163 MG/DL (ref 40–350)
IGG SERPL-MCNC: 1140 MG/DL (ref 650–1600)
IGM SERPL-MCNC: 47 MG/DL (ref 50–300)
IMM GRANULOCYTES # BLD AUTO: 0.07 K/UL (ref 0–0.04)
IMM GRANULOCYTES NFR BLD AUTO: 0.5 % (ref 0–0.5)
LYMPHOCYTES # BLD AUTO: 3 K/UL (ref 1–4.8)
LYMPHOCYTES NFR BLD: 23.5 % (ref 18–48)
MCH RBC QN AUTO: 28.4 PG (ref 27–31)
MCHC RBC AUTO-ENTMCNC: 31.7 G/DL (ref 32–36)
MCV RBC AUTO: 90 FL (ref 82–98)
MONOCYTES # BLD AUTO: 0.8 K/UL (ref 0.3–1)
MONOCYTES NFR BLD: 5.9 % (ref 4–15)
NEUTROPHILS # BLD AUTO: 7.8 K/UL (ref 1.8–7.7)
NEUTROPHILS NFR BLD: 61 % (ref 38–73)
NRBC BLD-RTO: 0 /100 WBC
PLATELET # BLD AUTO: 280 K/UL (ref 150–450)
PMV BLD AUTO: 10.1 FL (ref 9.2–12.9)
POTASSIUM SERPL-SCNC: 4.3 MMOL/L (ref 3.5–5.1)
PROT SERPL-MCNC: 7.7 G/DL (ref 6–8.4)
RBC # BLD AUTO: 5.04 M/UL (ref 4.6–6.2)
SODIUM SERPL-SCNC: 137 MMOL/L (ref 136–145)
WBC # BLD AUTO: 12.85 K/UL (ref 3.9–12.7)

## 2024-09-25 PROCEDURE — 3008F BODY MASS INDEX DOCD: CPT | Mod: CPTII,S$GLB,,

## 2024-09-25 PROCEDURE — 87340 HEPATITIS B SURFACE AG IA: CPT

## 2024-09-25 PROCEDURE — G2211 COMPLEX E/M VISIT ADD ON: HCPCS | Mod: S$GLB,,,

## 2024-09-25 PROCEDURE — 82784 ASSAY IGA/IGD/IGG/IGM EACH: CPT | Mod: 59

## 2024-09-25 PROCEDURE — 3079F DIAST BP 80-89 MM HG: CPT | Mod: CPTII,S$GLB,,

## 2024-09-25 PROCEDURE — 86704 HEP B CORE ANTIBODY TOTAL: CPT

## 2024-09-25 PROCEDURE — 99999 PR PBB SHADOW E&M-EST. PATIENT-LVL IV: CPT | Mod: PBBFAC,,,

## 2024-09-25 PROCEDURE — 85025 COMPLETE CBC W/AUTO DIFF WBC: CPT

## 2024-09-25 PROCEDURE — 1159F MED LIST DOCD IN RCRD: CPT | Mod: CPTII,S$GLB,,

## 2024-09-25 PROCEDURE — 99215 OFFICE O/P EST HI 40 MIN: CPT | Mod: S$GLB,,,

## 2024-09-25 PROCEDURE — 80053 COMPREHEN METABOLIC PANEL: CPT

## 2024-09-25 PROCEDURE — 3075F SYST BP GE 130 - 139MM HG: CPT | Mod: CPTII,S$GLB,,

## 2024-09-25 PROCEDURE — 1160F RVW MEDS BY RX/DR IN RCRD: CPT | Mod: CPTII,S$GLB,,

## 2024-09-25 PROCEDURE — 36415 COLL VENOUS BLD VENIPUNCTURE: CPT

## 2024-09-25 RX ORDER — GABAPENTIN 300 MG/1
300 CAPSULE ORAL NIGHTLY
Qty: 90 CAPSULE | Refills: 1 | Status: SHIPPED | OUTPATIENT
Start: 2024-09-25

## 2024-09-25 RX ORDER — DIAZEPAM 5 MG/1
TABLET ORAL
Qty: 2 TABLET | Refills: 0 | Status: SHIPPED | OUTPATIENT
Start: 2024-09-25

## 2024-09-25 RX ORDER — OXYBUTYNIN CHLORIDE 5 MG/1
5 TABLET ORAL NIGHTLY
Qty: 90 TABLET | Refills: 1 | Status: SHIPPED | OUTPATIENT
Start: 2024-09-25

## 2024-09-25 RX ORDER — ASPIRIN 325 MG
TABLET, DELAYED RELEASE (ENTERIC COATED) ORAL
Qty: 12 CAPSULE | Refills: 3 | Status: SHIPPED | OUTPATIENT
Start: 2024-09-25

## 2024-09-25 RX ORDER — SILDENAFIL 50 MG/1
50 TABLET, FILM COATED ORAL DAILY PRN
Qty: 90 TABLET | Refills: 2 | Status: SHIPPED | OUTPATIENT
Start: 2024-09-25

## 2024-09-25 RX ORDER — DULOXETIN HYDROCHLORIDE 30 MG/1
30 CAPSULE, DELAYED RELEASE ORAL DAILY
Qty: 90 CAPSULE | Refills: 1 | Status: SHIPPED | OUTPATIENT
Start: 2024-09-25 | End: 2025-03-24

## 2024-09-25 RX ORDER — GABAPENTIN 600 MG/1
600 TABLET ORAL NIGHTLY
Qty: 180 TABLET | Refills: 1 | Status: SHIPPED | OUTPATIENT
Start: 2024-09-25

## 2024-09-25 NOTE — PROGRESS NOTES
Patient ID: Christiano Aguayo is a 29 y.o. male who presents today for a routine clinic visit for MS.  He was last seen by Dr. Ramesh on 3/27/2024.  The history was provided by the patient.     Principle neurological diagnosis: MS  Date of symptom onset: Nov 2019  Date of diagnosis: May 2021  Disease type at diagnosis: RR  Disease type currently: RR  Previous therapy: NA  Current therapy: Ocrevus 9/2021 - present  Vitamin D Dose: 50,000IU weekly   Last MRI Brain: 9/15/2023 - stable   Last MRI C-spine: 9/15/46401 - stable  Last MRI T-spine: 5/28/21 - one lesion   CSF: NA  JCV status: 5/20/2021 - negative 0.20 index  Other relevant labs and tests: 5/20/2021: NMO and MOG Ab - neg, 3/27/2024: Vitamin D - 53, NfL - 3    Subjective:     He states that he has a new job now and it is a lot less stressful and he is feeling so much better because of it.      He does state that he feels that he does get a little bit of the crap gap about a week before when his infusion is due.  He says his family notices it because he will be come more irritable due to fatigue.     He and his wife had a baby boy 4 months ago and he is doing great - sleeping through the night!    He will now be on his wife's insurance, which will be active in the next few days.        ROS:      9/24/2024     8:11 PM   REVIEW OF SYMPTOMS   Do you feel abnormally tired on most days? Yes - but he can tolerate it.    Do you feel you generally sleep well? No - urinates a lot during the night    Do you have difficulty controlling your bladder?  Yes - urinary frequency he has had these issues since he was a little kid.    Do you have difficulty controlling your bowels?  No    Do you have frequent muscle cramps, tightness or spasms in your limbs?  No   Do you have new visual symptoms?  No   Do you have worsening difficulty with your memory or thinking? No   Do you have worsening symptoms of anxiety or depression?  No   For patients who walk, Do you have more difficulty  walking?  No   Have you fallen since your last visit?  No   For patients who use wheelchairs: Do you have any skin wounds or breakdown? Not Applicable   Do you have difficulty using your hands?  No   Do you have shooting or burning pain? Yes - gabapentin helps   Do you have difficulty with sexual function?  Yes - on Viagra    If you are sexually active, are you using birth control? Y/N  N/A No   Do you often choke when swallowing liquids or solid food?  No   Do you experience worsening symptoms when overheated? Yes   Do you need any new equipment such as a wheelchair, walker or shower chair? No   Do you receive co-pay financial assistance for your principal MS medicine? No   Would you be interested in participating in an MS research trial in the future? No   For patients on Gilenya, Tecfidera, Aubagio, Rituxan, Ocrevus, Tysabri, Lemtrada or Methotrexate, are you aware that you should NOT receive live virus vaccines?  Yes   Do you feel you have adequate family/friend support?  Yes   Do you have health insurance?   Yes   Are you currently employed? Yes   Do you receive SSDI/SSI?  Not Applicable   Do you use marijuana or cannabis products? No   Have you been diagnosed with a urinary tract infection since your last visit here? No   Have you been diagnosed with a respiratory tract infection since your last visit here? No   Have you been to the emergency room since your last visit here? No   Have you been hospitalized since your last visit here?  No            9/24/2024     8:16 PM   FSS SCORE & INTERPRETATION   FSS SCORE  9   FSS SCORE INTERPRETATION May not be suffering from fatigue         9/24/2024     8:15 PM   MS CANDICE-D SCORE & INTERPRETATION   CANDICE-D SCORE  4   CANDICE-D INTERPRETATION  No indication of Depression         9/24/2024     8:12 PM   MS GEORGINA-7 SCORE & INTERPRETATION   GEORGINA-7 SCORE  1   GEORGINA-7 SCORE INTERPRETATION Normal         9/24/2024     8:17 PM   PEQ MS MOS PAIN EFFECTS SCORE & INTERPRETATION   PES SCORE 7    PES SCORE INTERPRETATION Scores can range from 6-30.  Items are scaled so that higher scores indicate a greater impact of pain on a patients mood and behavior.         9/24/2024     8:18 PM   PEQ MS SEXUAL SATISFACTION SCORE & INTERPRETATION   SSS SCORE  8   SSS SCORE INTERPRETATION Scores can range from 4-24.  Higher scores indicate greater problems with sexual satisfaction.         9/24/2024     8:20 PM   MS BLADDER CONTROL SCORE & INTERPRETATION   BLCS SCORE 18   BLCS SCORE INTERPRETATION  Scores can range from 0-22, with higher scores indicating greater bladder control problems.         9/24/2024     8:23 PM   MS BOWEL CONTROL SCORE & INTERPRETATION   BWCS SCORE 9   BWCS SCORE INTERPRETATION Scores can range from 0-26, with higher scores indicating greater bowel control problems.         9/24/2024     8:20 PM   PEQ MS IMPACT OF VISUAL IMPAIRMENT SCORE & INTERPRETATION   ESTHER SCALE SCORE  0   ESTHER SCORE INTERPRETATION Scores can range from 0-15, with higher scores indicating greater impact of visual problems on daily activites.         9/24/2024     8:22 PM   MS PDQ SCORE & INTERPRETATION   PDQ RETROSPECTIVE MEMORY SUBSCALE 0   PDQ ATTENTION/CONCENTRATION SUBSCALE 0   PDQ PROSPECTIVE MEMORY SUBSCALE 0   PDQ PLANNING/ORGANIZATION SUBSCALE 1   PDQ TOTAL SCORE 1   PDQ SCORE INTERPRETATION Scores can range from 0-80, with higher scores indicating greater perceived cognitive impairment.         9/24/2024     8:24 PM   MSSS SCORE & INTERPRETATION   MSSS TANGIBLE SUPPORT SUBSCALE 68.75   MSSS EMOTIONAL/INFORMATIONAL SUPPORT SUBSCALE 56.25   MSSS AFFECTIONATE SUPPORT SUBSCALE 75   MSSS POSITIVE SOCIAL INTERACTION SUBSCALE 75   MSSS TOTAL SCORE 68.75   MSSS SCORE INTERPRETATION Scores can range from 0-100, with higher scores indicating greater perceived support.        SOCIAL HISTORY  Living arrangements - the patient lives with their family.  Social History     Socioeconomic History    Marital status:     Tobacco Use    Smoking status: Never    Smokeless tobacco: Current     Types: Snuff   Substance and Sexual Activity    Alcohol use: Yes     Comment: rarely    Drug use: Never    Sexual activity: Yes     Partners: Female     Birth control/protection: None     Social Determinants of Health     Financial Resource Strain: Low Risk  (1/16/2024)    Overall Financial Resource Strain (CARDIA)     Difficulty of Paying Living Expenses: Not hard at all   Food Insecurity: No Food Insecurity (1/16/2024)    Hunger Vital Sign     Worried About Running Out of Food in the Last Year: Never true     Ran Out of Food in the Last Year: Never true   Transportation Needs: No Transportation Needs (1/16/2024)    PRAPARE - Transportation     Lack of Transportation (Medical): No     Lack of Transportation (Non-Medical): No   Physical Activity: Insufficiently Active (1/16/2024)    Exercise Vital Sign     Days of Exercise per Week: 1 day     Minutes of Exercise per Session: 30 min   Stress: Stress Concern Present (1/16/2024)    [employer] - Occupational Stress Questionnaire     Feeling of Stress : To some extent   Housing Stability: Low Risk  (1/16/2024)    Housing Stability Vital Sign     Unable to Pay for Housing in the Last Year: No     Number of Places Lived in the Last Year: 1     Unstable Housing in the Last Year: No       Current Outpatient Medications on File Prior to Visit   Medication Sig Dispense Refill    ocrelizumab (OCREVUS) 30 mg/mL Soln Infuse Ocrevus 600mg in 500mL of 0.9% NaCl IV every 24 weeks. Pre-meds: Solumedrol 100mg IVPB, Benadryl 50mg IVP, Tylenol 1000mg PO, Pepcid 20mg IVP. 20 mL 1    [DISCONTINUED] cholecalciferol, vitamin D3, 1,250 mcg (50,000 unit) capsule TAKE ONE CAPSULE EVERY WEEK ON THE SAME DAY *THANK YOU* 12 capsule 3    [DISCONTINUED] gabapentin (NEURONTIN) 300 MG capsule Take 1 capsule (300 mg total) by mouth every evening. Ok to take with gabapentin 1200mg 90 capsule 1     [DISCONTINUED] gabapentin (NEURONTIN) 600 MG tablet TAKE 2 TABLETS EVERY EVENING *THANK YOU* 180 tablet 1    [DISCONTINUED] sildenafiL (VIAGRA) 50 MG tablet Take 1 tablet (50 mg total) by mouth daily as needed for Erectile Dysfunction. TAKE 1 TABLET DAILY AS NEEDED FOR ERECTILE DYSFUNCTION 90 tablet 2    [DISCONTINUED] DULoxetine (CYMBALTA) 30 MG capsule Take 1 capsule (30 mg total) by mouth once daily. 90 capsule 1     No current facility-administered medications on file prior to visit.       Objective:     1. 25 foot timed walk:      3/27/2024    12:01 AM 9/25/2024    12:01 AM   Timed 25 Foot Walk:   Did patient wear an AFO? No No   Was assistive device used? No No   Time for 25 Foot Walk (seconds) 3.95 3.83   Time for 25 Foot Walk (seconds) 4.03            NEURO EXAM    In general, the patient is well nourished and appears to be in no acute distress.    MENTAL STATUS: language is fluent, normal verbal comprehension, short-term and remote memory is intact, attention is normal, patient is alert and oriented x 3, fund of knowlege is appropriate by vocabulary.     CRANIAL NERVE EXAM:  There is no internuclear ophthalmoplegia.  Extraocular muscles are intact. No facial asymmetry. Facial sensation is intact bilaterally. There is no dysarthria. Uvula is midline, and palate moves symmetrically. Shoulder shrug intact bilaterlly. Tongue protrusion is midline. Hearing is intact to finger rub bilaterally. Neck is supple with full ROM    MOTOR EXAM: Normal bulk and tone throughout UE and LE bilaterally.  Rapid sequential movements are normal; Strength is  5/5 in all groups in the lower extremities and upper extremities    REFLEXES: 2+ and symmetric throughout in all four extremities     SENSORY EXAM: Normal to light touch throughout.    COORDINATION: Normal finger-to-nose exam. Normal heel-to-shin exam.    GAIT: Narrow based and stable. Able to heel walk, toe walk, and perform tandem gait.     Negative Romberg's    Imaging:      Personally reviewed.     Results for orders placed during the hospital encounter of 09/15/23    MRI Brain Demyelinating Without Contrast    Impression  BRAIN:    1. Significantly motion degraded exam demonstrates no gross interval change again with findings that would be consistent with patient's clinical history of an underlying demyelinating process with mild plaque burden.  No acute process.  2. 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  CERVICAL:    1. Significantly motion degraded exam demonstrates stable short-segment presumed demyelinating plaques of the upper cervical spine.  No acute process.  2. Degenerative changes at C3-C4 and C4-C5 are stable from the prior exam most notable for foraminal narrowing discussed above.      Electronically signed by: Fabricio Lala  Date:    09/15/2023  Time:    10:17    Results for orders placed during the hospital encounter of 09/15/23    MRI Cervical Spine Demyelinating Without Contrast    Impression  BRAIN:    1. Significantly motion degraded exam demonstrates no gross interval change again with findings that would be consistent with patient's clinical history of an underlying demyelinating process with mild plaque burden.  No acute process.  2. 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  CERVICAL:    1. Significantly motion degraded exam demonstrates stable short-segment presumed demyelinating plaques of the upper cervical spine.  No acute process.  2. Degenerative changes at C3-C4 and C4-C5 are stable from the prior exam most notable for foraminal narrowing discussed above.      Electronically signed by: Fabricio Lala  Date:    09/15/2023  Time:    10:17    No results found for this or any previous visit.    Results for orders placed during the hospital encounter of 02/15/22    MRI Brain Demyelinating W W/O  Contrast    Impression  1. Stable appearance of the brain with few foci of FLAIR and T2 hyperintense signal in the cerebral white matter is well as the brainstem.  There is a provided history of multiple sclerosis.  These findings would be consistent with epic provided diagnosis.  There are no new regions of signal abnormality in the brain.  There are no regions of restricted diffusion or abnormal enhancement to suggest interval progression of disease or active demyelination.  2. 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      Electronically signed by: Hossein Reyes MD  Date:    02/15/2022  Time:    07:40    Results for orders placed during the hospital encounter of 02/15/22    MRI Cervical Spine Demyelinating W W/O Contrast    Impression  1. Patient motion does limit evaluation.  There are regions of abnormal signal intensity in the cord.  These were present previously.  A lesion in the posterior cord at the level of C1-2 through superior C3 was present previously but appears slightly larger on the current study.  This may reflect mild interval progression of disease but there is no abnormal enhancement to suggest active demyelination.  Similarly, a lesion previously seen in the left posterolateral cord at the level of C4 on C5 is without definite change.  Smaller lesions at the level of C3 or likely present on the most recent prior study but patient motion does limit evaluation.  2. There is no spinal stenosis or cord compression but foraminal narrowing at several levels due mostly due to uncovertebral spurring is without change and described above.      Electronically signed by: Hossein Reyes MD  Date:    02/15/2022  Time:    07:50    Results for orders placed during the hospital encounter of 05/28/21    MRI Thoracic Spine  Demyelinating W W/O Contrast    Impression  1. There is a similar appearance of the thoracic spine and cord when compared to the prior study.  The images are degraded by patient body habitus and motion.  There is no fracture or malalignment.  The spinal canal is somewhat small on a developmental basis and there is prominent dorsal epidural fat (epidural lipomatosis).  These findings are unchanged.  There is no significant spinal canal or foraminal stenosis.  2. There is suggestion of abnormal T2 hyperintense signal in the left lateral cord at the level of T12.  This is better demonstrated and is more conspicuous on the comparison study.  There is no other definite region of signal abnormality in the thoracic cord.  There is no abnormal enhancement.      Electronically signed by: Hossein Reyes MD  Date:    05/28/2021  Time:    08:50        Labs:     Lab Results   Component Value Date    VRELKTAB92DJ 53 03/27/2024    PUDLBPXX65ZL 28 (L) 03/10/2023    HOGRQTED93DB 16 (L) 04/19/2021     Lab Results   Component Value Date    JCVINDEX 0.20 (H) 05/20/2021    JCVANTIBODY INDETERMINATE (A) 05/20/2021     Lab Results   Component Value Date    UL7RQTCC 71.2 05/20/2021    ABSOLUTECD3 2308 (H) 05/20/2021    YQ7IEEZL 16.0 05/20/2021    ABSOLUTECD8 519 05/20/2021    TP7BFNQH 50.7 05/20/2021    ABSOLUTECD4 1643 (H) 05/20/2021    LABCD48 3.16 05/20/2021     Lab Results   Component Value Date    WBC 12.85 (H) 09/25/2024    RBC 5.04 09/25/2024    HGB 14.3 09/25/2024    HCT 45.1 09/25/2024    MCV 90 09/25/2024    MCH 28.4 09/25/2024    MCHC 31.7 (L) 09/25/2024    RDW 12.9 09/25/2024     09/25/2024    MPV 10.1 09/25/2024    GRAN 7.8 (H) 09/25/2024    GRAN 61.0 09/25/2024    LYMPH 3.0 09/25/2024    LYMPH 23.5 09/25/2024    MONO 0.8 09/25/2024    MONO 5.9 09/25/2024    EOS 1.1 (H) 09/25/2024    BASO 0.10 09/25/2024    EOSINOPHIL 8.3 (H) 09/25/2024    BASOPHIL 0.8 09/25/2024     Sodium   Date Value Ref Range Status   09/25/2024  137 136 - 145 mmol/L Final     Potassium   Date Value Ref Range Status   09/25/2024 4.3 3.5 - 5.1 mmol/L Final     Chloride   Date Value Ref Range Status   09/25/2024 104 95 - 110 mmol/L Final     CO2   Date Value Ref Range Status   09/25/2024 25 23 - 29 mmol/L Final     Glucose   Date Value Ref Range Status   09/25/2024 99 70 - 110 mg/dL Final     BUN   Date Value Ref Range Status   09/25/2024 13 6 - 20 mg/dL Final     Creatinine   Date Value Ref Range Status   09/25/2024 0.9 0.5 - 1.4 mg/dL Final     Calcium   Date Value Ref Range Status   09/25/2024 9.8 8.7 - 10.5 mg/dL Final     Total Protein   Date Value Ref Range Status   09/25/2024 7.7 6.0 - 8.4 g/dL Final     Albumin   Date Value Ref Range Status   09/25/2024 4.1 3.5 - 5.2 g/dL Final     Total Bilirubin   Date Value Ref Range Status   09/25/2024 0.5 0.1 - 1.0 mg/dL Final     Comment:     For infants and newborns, interpretation of results should be based  on gestational age, weight and in agreement with clinical  observations.    Premature Infant recommended reference ranges:  Up to 24 hours.............<8.0 mg/dL  Up to 48 hours............<12.0 mg/dL  3-5 days..................<15.0 mg/dL  6-29 days.................<15.0 mg/dL       Alkaline Phosphatase   Date Value Ref Range Status   09/25/2024 45 (L) 55 - 135 U/L Final     AST   Date Value Ref Range Status   09/25/2024 57 (H) 10 - 40 U/L Final     ALT   Date Value Ref Range Status   09/25/2024 85 (H) 10 - 44 U/L Final     Anion Gap   Date Value Ref Range Status   09/25/2024 8 8 - 16 mmol/L Final     eGFR if    Date Value Ref Range Status   02/15/2022 >60.0 >60 mL/min/1.73 m^2 Final     eGFR if non    Date Value Ref Range Status   02/15/2022 >60.0 >60 mL/min/1.73 m^2 Final     Comment:     Calculation used to obtain the estimated glomerular filtration  rate (eGFR) is the CKD-EPI equation.        Lab Results   Component Value Date    HEPBSAG Non-reactive 09/25/2024     HEPBSAB 91.66 09/01/2022    HEPBSAB Reactive 09/01/2022    HEPBCAB Non-reactive 09/25/2024           MS Impression and Plan:     NEURO MULTIPLE SCLEROSIS IMPRESSION:   Number of relapses in the past year?:  0  Clinical Progression:  Clinically Stable  MRI Progression:  Stable  MS Classification:  Relapsing-Remitting MS  DMT:  No change in management  DMT comment:  Continue Ocrevus, will infuse every 24 weeks. He is aware of the risks associated with immunosuppressant therapy, including increased risk of infection.   Symptom Management:  Implement change in symptom management  Implement Change in Symptom Management:  Bladder and Fatigue (will start CoQ10 for fatigue, will start nightly oxybutynin 5-10mg and refer to urology to assess for structual issues since he has been dealing with this his whole life.)   Labs today,   Brain MRI soon - will order Valium  Follow up with Dr. Gadiel young in 6 months   Plan discussed and questions were answered to satisfaction.     Problem List Items Addressed This Visit          Neuro    Multiple sclerosis - Primary    Relevant Medications    diazePAM (VALIUM) 5 MG tablet    oxybutynin (DITROPAN) 5 MG Tab    DULoxetine (CYMBALTA) 30 MG capsule    Other Relevant Orders    CBC Auto Differential (Completed)    Comprehensive Metabolic Panel (Completed)    Hepatitis B Core Antibody, Total (Completed)    Hepatitis B Surface Antigen (Completed)    Immunoglobulins (IgG, IgA, IgM) Quantitative (Completed)    MRI Brain Demyelinating Without Contrast    Ambulatory referral/consult to Urology     Other Visit Diagnoses       OAB (overactive bladder)        Relevant Medications    oxybutynin (DITROPAN) 5 MG Tab    Other Relevant Orders    Ambulatory referral/consult to Urology    Vitamin D deficiency        Relevant Medications    cholecalciferol, vitamin D3, 1,250 mcg (50,000 unit) capsule    Neuropathic pain        Relevant Medications    DULoxetine (CYMBALTA) 30 MG capsule    gabapentin  (NEURONTIN) 300 MG capsule    gabapentin (NEURONTIN) 600 MG tablet    Erectile dysfunction, unspecified erectile dysfunction type        Relevant Medications    sildenafiL (VIAGRA) 50 MG tablet            I spent a total of 65 minutes on the day of the visit.This includes face to face time and non-face to face time preparing to see the patient (eg, review of tests), obtaining and/or reviewing separately obtained history, documenting clinical information in the electronic or other health record, independently interpreting results and communicating results to the patient/family/caregiver, or care coordinator.       Viky Solano, NERY-C

## 2024-09-25 NOTE — Clinical Note
He is getting new insurance which should be active in the next couple days.  He says that he may just need insurance to be billed later.  Can we have him on a 24 week cycle please.  Thank you!

## 2024-12-16 ENCOUNTER — PATIENT MESSAGE (OUTPATIENT)
Dept: PSYCHIATRY | Facility: CLINIC | Age: 29
End: 2024-12-16
Payer: COMMERCIAL

## 2024-12-19 ENCOUNTER — DOCUMENTATION ONLY (OUTPATIENT)
Dept: NEUROLOGY | Facility: CLINIC | Age: 29
End: 2024-12-19
Payer: COMMERCIAL

## 2025-01-29 ENCOUNTER — OFFICE VISIT (OUTPATIENT)
Dept: PRIMARY CARE CLINIC | Facility: CLINIC | Age: 30
End: 2025-01-29
Payer: COMMERCIAL

## 2025-01-29 VITALS
OXYGEN SATURATION: 98 % | SYSTOLIC BLOOD PRESSURE: 122 MMHG | BODY MASS INDEX: 45.1 KG/M2 | HEIGHT: 70 IN | TEMPERATURE: 98 F | HEART RATE: 76 BPM | DIASTOLIC BLOOD PRESSURE: 88 MMHG | WEIGHT: 315 LBS

## 2025-01-29 DIAGNOSIS — E66.01 CLASS 3 SEVERE OBESITY DUE TO EXCESS CALORIES WITHOUT SERIOUS COMORBIDITY WITH BODY MASS INDEX (BMI) OF 45.0 TO 49.9 IN ADULT: ICD-10-CM

## 2025-01-29 DIAGNOSIS — Z13.6 ENCOUNTER FOR LIPID SCREENING FOR CARDIOVASCULAR DISEASE: Primary | ICD-10-CM

## 2025-01-29 DIAGNOSIS — G35 MULTIPLE SCLEROSIS: ICD-10-CM

## 2025-01-29 DIAGNOSIS — Z13.220 ENCOUNTER FOR LIPID SCREENING FOR CARDIOVASCULAR DISEASE: Primary | ICD-10-CM

## 2025-01-29 DIAGNOSIS — E66.813 CLASS 3 SEVERE OBESITY DUE TO EXCESS CALORIES WITHOUT SERIOUS COMORBIDITY WITH BODY MASS INDEX (BMI) OF 45.0 TO 49.9 IN ADULT: ICD-10-CM

## 2025-02-17 PROBLEM — G56.02 CARPAL TUNNEL SYNDROME OF LEFT WRIST: Status: RESOLVED | Noted: 2020-03-18 | Resolved: 2025-02-17

## 2025-02-17 NOTE — PROGRESS NOTES
Assessment/Plan:    1. Encounter for lipid screening for cardiovascular disease  -     Lipid Panel; Standing    2. Multiple sclerosis  Overview:  -managed by neurology  -currently receiving Ocrevus infusions  -remains on gabapentin and cymbalta for neuropathic pain  -on oxybutynin for neurogenic bladder      3. Class 3 severe obesity due to excess calories without serious comorbidity with body mass index (BMI) of 45.0 to 49.9 in adult  Overview:  General weight loss/lifestyle modification strategies discussed (elicit support from others; identify saboteurs; non-food rewards, etc).  Informal exercise measures discussed, e.g. taking stairs instead of elevator.  Regular aerobic exercise program discussed.            Visit today included increased complexity associated with the care of the episodic problem(s) addressed and managing the longitudinal care of the patient due to the serious and/or complex managed problem(s). See above assessment/plan.      Simran Nash MD  ______________________________________________________________________________________________________________________________    CC: Establish care    History of Present Illness  The patient presents to establish care.    He has been diagnosed with multiple sclerosis (MS) and is under the care of a neurologist in [place], where he receives Ocrevus infusions. He has an upcoming appointment with neurology on 03/14/2025. He reports that his family and friends have observed increased irritability in him approximately 2 weeks prior to his scheduled injection, which he attributes to sleep disturbances. Otherwise, he reports that he is doing well with MS symptoms and his required treatment for the condition.    He is currently on Ditropan for overactive bladder, which he takes at night. He reports frequent urination, including bedwetting, but does not experience daytime incontinence. He also reports difficulty emptying his bladder fully during the night,  but not during the day. He has a history of bladder issues since childhood. He does not believe his symptoms are psychological in nature and reports no excessive thirst. He experiences dry mouth in the morning, which he manages by drinking water upon waking.    He is on Cymbalta 30 mg for pain management and anxiety, which he started at the end of 2024. He reports intermittent relief from Cymbalta, with periods of several weeks without pain, during which he can reduce his gabapentin dosage from 1500 mg to 1200 mg. However, he also experiences weeks of increased pain. He reports no side effects from Cymbalta. He describes his anxiety as manifesting more as aggression. He has noticed withdrawal symptoms when he misses a dose of Cymbalta. He reports no recent chest pain, shortness of breath, new headaches, or vision changes.    He is on gabapentin 1500 mg at night, which he reports is necessary for sleep. He experiences shoulder pain, which he attributes to his sleeping position on his stomach. He reports numbness in his shoulders, arms, and hands, extending to his neck on both sides, but no weakness. He believes this numbness may be permanent. He reports no weakness.    He has a history of optic neuritis and sees an ophthalmologist regularly. He reports that his vision has improved since starting treatment, but he still experiences some tracking issues.    SOCIAL HISTORY  He has 2 sons, aged 7 months and 3 years.     No other new complaints today. Remaining chronic conditions have been reviewed and remain stable. Further detail as stated above.     Health Maintenance reviewed.    Past Medical History:  Past Medical History:   Diagnosis Date    Asthma     Carpal tunnel syndrome of left wrist 3/18/2020     Past Surgical History:   Procedure Laterality Date    ADENOIDECTOMY      APPENDECTOMY      CARPAL TUNNEL RELEASE Left 05/12/2021    CARPAL TUNNEL RELEASE Right 05/15/2021     Review of patient's allergies  "indicates:  No Known Allergies  Social History[1]  Family History   Problem Relation Name Age of Onset    Diabetes Mother      Diabetes Father      Hyperlipidemia Father      Hypertension Father      Arthritis Father      Asthma Father      Depression Father      Hearing loss Father      Heart disease Father      Kidney disease Father      Adrenal disorder Father      Gout Father       Medications Ordered Prior to Encounter[2]    Review of Systems   Constitutional:  Negative for chills, diaphoresis, fatigue and fever.   HENT:  Negative for congestion, ear pain, postnasal drip, sinus pain and sore throat.    Eyes:  Negative for pain and redness.   Respiratory:  Negative for cough, chest tightness and shortness of breath.    Cardiovascular:  Negative for chest pain and leg swelling.   Gastrointestinal:  Negative for abdominal pain, constipation, diarrhea, nausea and vomiting.   Genitourinary:  Positive for enuresis and frequency. Negative for dysuria, flank pain and hematuria.   Musculoskeletal:  Negative for arthralgias and joint swelling.   Skin:  Negative for rash.   Neurological:  Positive for numbness. Negative for dizziness, syncope, weakness and headaches.   Psychiatric/Behavioral:  Negative for dysphoric mood. The patient is not nervous/anxious.      Vitals:    01/29/25 0828   BP: 122/88   Pulse: 76   Temp: 98.3 °F (36.8 °C)   TempSrc: Temporal   SpO2: 98%   Weight: (!) 145.7 kg (321 lb 1.6 oz)   Height: 5' 10" (1.778 m)       Wt Readings from Last 3 Encounters:   01/29/25 (!) 145.7 kg (321 lb 1.6 oz)   09/25/24 (!) 144.7 kg (318 lb 14.3 oz)   03/27/24 (!) 143.1 kg (315 lb 7.7 oz)       Physical Exam  Constitutional:       General: He is not in acute distress.     Appearance: Normal appearance. He is well-developed. He is obese.   HENT:      Head: Normocephalic and atraumatic.   Eyes:      Conjunctiva/sclera: Conjunctivae normal.   Cardiovascular:      Rate and Rhythm: Normal rate and regular " rhythm.      Pulses: Normal pulses.      Heart sounds: Normal heart sounds. No murmur heard.  Pulmonary:      Effort: Pulmonary effort is normal. No respiratory distress.      Breath sounds: Normal breath sounds.   Abdominal:      General: Bowel sounds are normal. There is no distension.      Palpations: Abdomen is soft.      Tenderness: There is no abdominal tenderness.   Musculoskeletal:         General: Normal range of motion.      Cervical back: Normal range of motion and neck supple.   Skin:     General: Skin is warm and dry.      Findings: No rash.   Neurological:      General: No focal deficit present.      Mental Status: He is alert and oriented to person, place, and time.   Psychiatric:         Mood and Affect: Mood normal.         Behavior: Behavior normal.       DISCLAIMER: This note was compiled by using a speech recognition dictation system and therefore please be aware that typographical / speech recognition errors can and do occur.  Please contact me if you see any errors specifically.  Consent was obtained for [employer] recording system prior to the visit.         [1]  Social History  Tobacco Use    Smoking status: Never    Smokeless tobacco: Current     Types: Snuff   Substance Use Topics    Alcohol use: Yes     Comment: rarely    Drug use: Never   [2]  Current Outpatient Medications on File Prior to Visit   Medication Sig Dispense Refill    cholecalciferol, vitamin D3, 1,250 mcg (50,000 unit) capsule TAKE ONE CAPSULE EVERY WEEK ON THE SAME DAY 12 capsule 3    DULoxetine (CYMBALTA) 30 MG capsule Take 1 capsule (30 mg total) by mouth once daily. 90 capsule 1    gabapentin (NEURONTIN) 300 MG capsule Take 1 capsule (300 mg total) by mouth every evening. Ok to take with gabapentin 1200mg (Patient taking differently: Take 300 mg by mouth every evening. Total of 1500 mg) 90 capsule 1    gabapentin (NEURONTIN) 600 MG tablet Take 1 tablet (600 mg total) by mouth every evening. (Patient taking differently:  Take 1,200 mg by mouth every evening. Total of 1500 mg) 180 tablet 1    ocrelizumab (OCREVUS) 30 mg/mL Soln Infuse Ocrevus 600mg in 500mL of 0.9% NaCl IV every 24 weeks. Pre-meds: Solumedrol 100mg IVPB, Benadryl 50mg IVP, Tylenol 1000mg PO, Pepcid 20mg IVP. 20 mL 1    oxybutynin (DITROPAN) 5 MG Tab Take 1 tablet (5 mg total) by mouth every evening. 90 tablet 1    sildenafiL (VIAGRA) 50 MG tablet Take 1 tablet (50 mg total) by mouth daily as needed for Erectile Dysfunction. TAKE 1 TABLET DAILY AS NEEDED FOR ERECTILE DYSFUNCTION 90 tablet 2     No current facility-administered medications on file prior to visit.

## 2025-03-04 DIAGNOSIS — M79.2 NEUROPATHIC PAIN: ICD-10-CM

## 2025-03-06 RX ORDER — GABAPENTIN 300 MG/1
CAPSULE ORAL
Qty: 90 CAPSULE | Refills: 1 | Status: SHIPPED | OUTPATIENT
Start: 2025-03-06

## 2025-03-07 ENCOUNTER — PATIENT MESSAGE (OUTPATIENT)
Dept: PSYCHIATRY | Facility: CLINIC | Age: 30
End: 2025-03-07
Payer: COMMERCIAL

## 2025-03-20 ENCOUNTER — TELEPHONE (OUTPATIENT)
Dept: NEUROLOGY | Facility: CLINIC | Age: 30
End: 2025-03-20
Payer: COMMERCIAL

## 2025-03-21 ENCOUNTER — TELEPHONE (OUTPATIENT)
Dept: NEUROLOGY | Facility: CLINIC | Age: 30
End: 2025-03-21
Payer: COMMERCIAL

## 2025-03-21 ENCOUNTER — PATIENT MESSAGE (OUTPATIENT)
Dept: NEUROLOGY | Facility: CLINIC | Age: 30
End: 2025-03-21
Payer: COMMERCIAL

## 2025-03-25 ENCOUNTER — LAB VISIT (OUTPATIENT)
Dept: LAB | Facility: HOSPITAL | Age: 30
End: 2025-03-25
Payer: COMMERCIAL

## 2025-03-25 DIAGNOSIS — G35 MULTIPLE SCLEROSIS: ICD-10-CM

## 2025-03-25 DIAGNOSIS — Z13.6 ENCOUNTER FOR LIPID SCREENING FOR CARDIOVASCULAR DISEASE: ICD-10-CM

## 2025-03-25 DIAGNOSIS — Z13.220 ENCOUNTER FOR LIPID SCREENING FOR CARDIOVASCULAR DISEASE: ICD-10-CM

## 2025-03-25 LAB
ALBUMIN SERPL BCP-MCNC: 4.2 G/DL (ref 3.5–5.2)
ALP SERPL-CCNC: 51 UNIT/L (ref 40–150)
ALT SERPL W/O P-5'-P-CCNC: 88 UNIT/L (ref 10–44)
ANION GAP (OHS): 7 MMOL/L (ref 8–16)
AST SERPL-CCNC: 49 UNIT/L (ref 11–45)
BILIRUB SERPL-MCNC: 0.4 MG/DL (ref 0.1–1)
BUN SERPL-MCNC: 11 MG/DL (ref 6–20)
CALCIUM SERPL-MCNC: 9.8 MG/DL (ref 8.7–10.5)
CHLORIDE SERPL-SCNC: 103 MMOL/L (ref 95–110)
CHOLEST SERPL-MCNC: 230 MG/DL (ref 120–199)
CHOLEST/HDLC SERPL: 5.9 {RATIO} (ref 2–5)
CO2 SERPL-SCNC: 27 MMOL/L (ref 23–29)
CREAT SERPL-MCNC: 0.9 MG/DL (ref 0.5–1.4)
GFR SERPLBLD CREATININE-BSD FMLA CKD-EPI: >60 ML/MIN/1.73/M2
GLUCOSE SERPL-MCNC: 94 MG/DL (ref 70–110)
HBV CORE AB SERPL QL IA: NORMAL
HBV SURFACE AG SERPL QL IA: NORMAL
HDLC SERPL-MCNC: 39 MG/DL (ref 40–75)
HDLC SERPL: 17 % (ref 20–50)
IGA SERPL-MCNC: 173 MG/DL (ref 40–350)
IGG SERPL-MCNC: 1166 MG/DL (ref 650–1600)
IGM SERPL-MCNC: 31 MG/DL (ref 50–300)
LDLC SERPL CALC-MCNC: 138.2 MG/DL (ref 63–159)
NONHDLC SERPL-MCNC: 191 MG/DL
POTASSIUM SERPL-SCNC: 4.6 MMOL/L (ref 3.5–5.1)
PROT SERPL-MCNC: 8.1 GM/DL (ref 6–8.4)
SODIUM SERPL-SCNC: 137 MMOL/L (ref 136–145)
TRIGL SERPL-MCNC: 264 MG/DL (ref 30–150)

## 2025-03-25 PROCEDURE — 87340 HEPATITIS B SURFACE AG IA: CPT

## 2025-03-25 PROCEDURE — 85025 COMPLETE CBC W/AUTO DIFF WBC: CPT

## 2025-03-25 PROCEDURE — 86704 HEP B CORE ANTIBODY TOTAL: CPT

## 2025-03-25 PROCEDURE — 80061 LIPID PANEL: CPT

## 2025-03-25 PROCEDURE — 82784 ASSAY IGA/IGD/IGG/IGM EACH: CPT

## 2025-03-25 PROCEDURE — 80053 COMPREHEN METABOLIC PANEL: CPT

## 2025-03-25 PROCEDURE — 36415 COLL VENOUS BLD VENIPUNCTURE: CPT | Mod: PO

## 2025-03-26 LAB
ABSOLUTE EOSINOPHIL (OHS): 0.32 K/UL
ABSOLUTE MONOCYTE (OHS): 0.93 K/UL (ref 0.3–1)
ABSOLUTE NEUTROPHIL COUNT (OHS): 7.85 K/UL (ref 1.8–7.7)
BASOPHILS # BLD AUTO: 0.09 K/UL
BASOPHILS NFR BLD AUTO: 0.7 %
ERYTHROCYTE [DISTWIDTH] IN BLOOD BY AUTOMATED COUNT: 13.4 % (ref 11.5–14.5)
HCT VFR BLD AUTO: 48.7 % (ref 40–54)
HGB BLD-MCNC: 15.4 GM/DL (ref 14–18)
IMM GRANULOCYTES # BLD AUTO: 0.07 K/UL (ref 0–0.04)
IMM GRANULOCYTES NFR BLD AUTO: 0.6 % (ref 0–0.5)
LYMPHOCYTES # BLD AUTO: 3.41 K/UL (ref 1–4.8)
MCH RBC QN AUTO: 29.4 PG (ref 27–50)
MCHC RBC AUTO-ENTMCNC: 31.6 G/DL (ref 32–36)
MCV RBC AUTO: 93 FL (ref 82–98)
NUCLEATED RBC (/100WBC) (OHS): 0 /100 WBC
PLATELET # BLD AUTO: 283 K/UL (ref 150–450)
PMV BLD AUTO: 11 FL (ref 9.2–12.9)
RBC # BLD AUTO: 5.24 M/UL (ref 4.6–6.2)
RELATIVE EOSINOPHIL (OHS): 2.5 %
RELATIVE LYMPHOCYTE (OHS): 26.9 % (ref 18–48)
RELATIVE MONOCYTE (OHS): 7.3 % (ref 4–15)
RELATIVE NEUTROPHIL (OHS): 62 % (ref 38–73)
WBC # BLD AUTO: 12.67 K/UL (ref 3.9–12.7)

## 2025-03-31 ENCOUNTER — PATIENT MESSAGE (OUTPATIENT)
Dept: NEUROLOGY | Facility: CLINIC | Age: 30
End: 2025-03-31
Payer: COMMERCIAL

## 2025-03-31 DIAGNOSIS — M79.2 NEUROPATHIC PAIN: ICD-10-CM

## 2025-04-01 ENCOUNTER — TELEPHONE (OUTPATIENT)
Dept: NEUROLOGY | Facility: CLINIC | Age: 30
End: 2025-04-01
Payer: COMMERCIAL

## 2025-04-03 ENCOUNTER — PATIENT MESSAGE (OUTPATIENT)
Dept: PSYCHIATRY | Facility: CLINIC | Age: 30
End: 2025-04-03
Payer: COMMERCIAL

## 2025-04-03 RX ORDER — GABAPENTIN 600 MG/1
1200 TABLET ORAL NIGHTLY
Qty: 180 TABLET | Refills: 1 | Status: SHIPPED | OUTPATIENT
Start: 2025-04-03

## 2025-04-09 ENCOUNTER — PATIENT MESSAGE (OUTPATIENT)
Dept: NEUROLOGY | Facility: CLINIC | Age: 30
End: 2025-04-09
Payer: COMMERCIAL

## 2025-04-10 ENCOUNTER — OFFICE VISIT (OUTPATIENT)
Dept: NEUROLOGY | Facility: CLINIC | Age: 30
End: 2025-04-10
Payer: COMMERCIAL

## 2025-04-10 DIAGNOSIS — N32.81 OAB (OVERACTIVE BLADDER): ICD-10-CM

## 2025-04-10 DIAGNOSIS — M79.2 NEUROPATHIC PAIN: ICD-10-CM

## 2025-04-10 DIAGNOSIS — G35 MULTIPLE SCLEROSIS: Primary | ICD-10-CM

## 2025-04-10 DIAGNOSIS — E55.9 VITAMIN D DEFICIENCY: ICD-10-CM

## 2025-04-10 RX ORDER — DULOXETIN HYDROCHLORIDE 60 MG/1
60 CAPSULE, DELAYED RELEASE ORAL DAILY
Qty: 90 CAPSULE | Refills: 1 | Status: SHIPPED | OUTPATIENT
Start: 2025-04-10 | End: 2025-10-07

## 2025-04-10 RX ORDER — OXYBUTYNIN CHLORIDE 10 MG/1
10 TABLET, EXTENDED RELEASE ORAL DAILY
Qty: 90 TABLET | Refills: 1 | Status: SHIPPED | OUTPATIENT
Start: 2025-04-10

## 2025-04-10 RX ORDER — ASPIRIN 325 MG
TABLET, DELAYED RELEASE (ENTERIC COATED) ORAL
Qty: 12 CAPSULE | Refills: 3 | Status: SHIPPED | OUTPATIENT
Start: 2025-04-10

## 2025-04-10 NOTE — Clinical Note
Can you get him scheduled with me in 3 months in [place].  And also schedule his MRI some time between now and before he sees me again. Thank you!

## 2025-04-10 NOTE — Clinical Note
I also decided to put labs in now, can he get scheduled for that for August/early September. Thank you!

## 2025-04-10 NOTE — Clinical Note
If I have my date correct, we still have him scheduled at 26 weeks. I know he didn't get labs until 3/25 which would have been just a few days before the 24 week faby. Can we make sure we have him at 24 weeks with the next one (9/24). Or if my dates are off, let me know. I will also make sure he is scheduled for labs in August/early September.

## 2025-04-10 NOTE — PROGRESS NOTES
"Patient ID: Christiano Aguayo is a 30 y.o. male who presents today for a routine clinic visit for MS.  He was last seen on 9/25/2024. The history was provided by the patient.       The patient location is: in his car in [place]  The chief complaint leading to consultation is: MS    Visit type: audiovisual    Face to Face time with patient: 26 minutes     Each patient to whom he or she provides medical services by telemedicine is:  (1) informed of the relationship between the physician and patient and the respective role of any other health care provider with respect to management of the patient; and (2) notified that he or she may decline to receive medical services by telemedicine and may withdraw from such care at any time.    NEURO MULTIPLE SCLEROISIS SUMMARY:   Principle neurological diagnosis:  MS  Date of Symptom Onset:  11/2019  Date of Diagnosis:  5/2021  Disease type at diagnosis:  Relapsing-Remitting MS  Disease type currently:  Relapsing-Remitting MS  Previous Therapy:  First DMT:  None  Current Therapy:  Ocrelizumab - 9/2021 - present   Last MRI Brain:  9/15/2023 - stable  Last MRI C-Spine:  9/15/2023 - stable  Last MRI T-Spine:  5/28/2021 - one lesion  Labs   No CSF completed  Date JCV Completed:  5/20/2021  JCV Index:  0.2  JCV Antibody:  Negative  Other relevant labs and studies:    5/20/2021: NMO and MOG - negative   3/27/2024: vitamin D - 53, NfL - <3.0     Subjective:     For the last 3 weeks he has been feeling the "crap gap".  His nerve pain increases during this time (still effecting him more at night), and he is more irritable.     His PCP told him to increase his Cymbalta to 60mg and it is hard to tell if that has helped due to the crap venita.  Though he does state that before this period, he was feeling great and he increased the Cymbalta about 2 months ago.     He is still getting up multiple times a night to urinate.  He is talking 5mg of oxybutynin nightly.        ROS:      9/24/2024     8:11 PM "   REVIEW OF SYMPTOMS   Do you feel abnormally tired on most days? Yes   Do you feel you generally sleep well? No   Do you have difficulty controlling your bladder?  Yes   Do you have difficulty controlling your bowels?  Yes   Do you have frequent muscle cramps, tightness or spasms in your limbs?  No   Do you have new visual symptoms?  No   Do you have worsening difficulty with your memory or thinking? No   Do you have worsening symptoms of anxiety or depression?  No   For patients who walk, Do you have more difficulty walking?  No   Have you fallen since your last visit?  No   For patients who use wheelchairs: Do you have any skin wounds or breakdown? Not Applicable   Do you have difficulty using your hands?  No   Do you have shooting or burning pain? Yes   Do you have difficulty with sexual function?  Yes   If you are sexually active, are you using birth control? Y/N  N/A No   Do you often choke when swallowing liquids or solid food?  No   Do you experience worsening symptoms when overheated? Yes   Do you need any new equipment such as a wheelchair, walker or shower chair? No   Do you receive co-pay financial assistance for your principal MS medicine? No   Would you be interested in participating in an MS research trial in the future? No   For patients on Gilenya, Tecfidera, Aubagio, Rituxan, Ocrevus, Tysabri, Lemtrada or Methotrexate, are you aware that you should NOT receive live virus vaccines?  Yes   Do you feel you have adequate family/friend support?  Yes   Do you have health insurance?   Yes   Are you currently employed? Yes   Do you receive SSDI/SSI?  Not Applicable   Do you use marijuana or cannabis products? No   Have you been diagnosed with a urinary tract infection since your last visit here? No   Have you been diagnosed with a respiratory tract infection since your last visit here? No   Have you been to the emergency room since your last visit here? No   Have you been hospitalized since your last visit  here?  No            9/24/2024     8:16 PM   FSS SCORE & INTERPRETATION   FSS SCORE  9   FSS SCORE INTERPRETATION May not be suffering from fatigue         9/24/2024     8:15 PM   MS CANDICE-D SCORE & INTERPRETATION   CANDICE-D SCORE  4   CANDICE-D INTERPRETATION  No indication of Depression         9/24/2024     8:12 PM   MS GEORGINA-7 SCORE & INTERPRETATION   GEORGINA-7 SCORE  1   GEORGINA-7 SCORE INTERPRETATION Normal         9/24/2024     8:17 PM   PEQ MS MOS PAIN EFFECTS SCORE & INTERPRETATION   PES SCORE 7   PES SCORE INTERPRETATION Scores can range from 6-30.  Items are scaled so that higher scores indicate a greater impact of pain on a patients mood and behavior.         9/24/2024     8:18 PM   PEQ MS SEXUAL SATISFACTION SCORE & INTERPRETATION   SSS SCORE  8   SSS SCORE INTERPRETATION Scores can range from 4-24.  Higher scores indicate greater problems with sexual satisfaction.         9/24/2024     8:20 PM   MS BLADDER CONTROL SCORE & INTERPRETATION   BLCS SCORE 18   BLCS SCORE INTERPRETATION  Scores can range from 0-22, with higher scores indicating greater bladder control problems.         9/24/2024     8:23 PM   MS BOWEL CONTROL SCORE & INTERPRETATION   BWCS SCORE 9   BWCS SCORE INTERPRETATION Scores can range from 0-26, with higher scores indicating greater bowel control problems.         9/24/2024     8:20 PM   PEQ MS IMPACT OF VISUAL IMPAIRMENT SCORE & INTERPRETATION   ESTHER SCALE SCORE  0   ESTHER SCORE INTERPRETATION Scores can range from 0-15, with higher scores indicating greater impact of visual problems on daily activites.         9/24/2024     8:22 PM   MS PDQ SCORE & INTERPRETATION   PDQ RETROSPECTIVE MEMORY SUBSCALE 0   PDQ ATTENTION/CONCENTRATION SUBSCALE 0   PDQ PROSPECTIVE MEMORY SUBSCALE 0   PDQ PLANNING/ORGANIZATION SUBSCALE 1   PDQ TOTAL SCORE 1   PDQ SCORE INTERPRETATION Scores can range from 0-80, with higher scores indicating greater perceived cognitive impairment.         9/24/2024     8:24 PM   MSSS SCORE &  INTERPRETATION   MSSS TANGIBLE SUPPORT SUBSCALE 68.75   MSSS EMOTIONAL/INFORMATIONAL SUPPORT SUBSCALE 56.25   MSSS AFFECTIONATE SUPPORT SUBSCALE 75   MSSS POSITIVE SOCIAL INTERACTION SUBSCALE 75   MSSS TOTAL SCORE 68.75   MSSS SCORE INTERPRETATION Scores can range from 0-100, with higher scores indicating greater perceived support.        SOCIAL HISTORY  Living arrangements - the patient lives with their family.  Social History     Socioeconomic History    Marital status:    Tobacco Use    Smoking status: Never    Smokeless tobacco: Current     Types: Snuff   Substance and Sexual Activity    Alcohol use: Yes     Comment: rarely    Drug use: Never    Sexual activity: Yes     Partners: Female     Birth control/protection: None     Social Drivers of Health     Financial Resource Strain: Medium Risk (1/29/2025)    Overall Financial Resource Strain (CARDIA)     Difficulty of Paying Living Expenses: Somewhat hard   Food Insecurity: No Food Insecurity (1/29/2025)    Hunger Vital Sign     Worried About Running Out of Food in the Last Year: Never true     Ran Out of Food in the Last Year: Never true   Transportation Needs: No Transportation Needs (1/16/2024)    PRAPARE - Transportation     Lack of Transportation (Medical): No     Lack of Transportation (Non-Medical): No   Physical Activity: Sufficiently Active (1/29/2025)    Exercise Vital Sign     Days of Exercise per Week: 7 days     Minutes of Exercise per Session: 60 min   Stress: No Stress Concern Present (1/29/2025)    [employer] - Occupational Stress Questionnaire     Feeling of Stress : Not at all   Housing Stability: Low Risk  (1/16/2024)    Housing Stability Vital Sign     Unable to Pay for Housing in the Last Year: No     Number of Places Lived in the Last Year: 1     Unstable Housing in the Last Year: No       Medications Ordered Prior to Encounter[1]    Objective:     1. 25 foot timed walk:      3/27/2024     9:20 AM  9/25/2024    10:30 AM   Timed 25 Foot Walk:   Did patient wear an AFO? No No   Was assistive device used? No No   Time for 25 Foot Walk (seconds) 3.95 3.83   Time for 25 Foot Walk (seconds) 4.03        2. SDMT       No data to display                       NEURO EXAM    In general, the patient is well nourished and appears to be in no acute distress.    MENTAL STATUS: language is fluent, normal verbal comprehension, short-term and remote memory is intact, attention is normal, patient is alert and oriented x 3, fund of knowlege is appropriate by vocabulary.       Imaging: personally reviewed      Results for orders placed during the hospital encounter of 09/15/23    MRI Brain Demyelinating Without Contrast    Impression  BRAIN:    1. Significantly motion degraded exam demonstrates no gross interval change again with findings that would be consistent with patient's clinical history of an underlying demyelinating process with mild plaque burden.  No acute process.  2. 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  CERVICAL:    1. Significantly motion degraded exam demonstrates stable short-segment presumed demyelinating plaques of the upper cervical spine.  No acute process.  2. Degenerative changes at C3-C4 and C4-C5 are stable from the prior exam most notable for foraminal narrowing discussed above.      Electronically signed by: Fabricio Lala  Date:    09/15/2023  Time:    10:17    Results for orders placed during the hospital encounter of 09/15/23    MRI Cervical Spine Demyelinating Without Contrast    Impression  BRAIN:    1. Significantly motion degraded exam demonstrates no gross interval change again with findings that would be consistent with patient's clinical history of an underlying demyelinating process with mild plaque burden.  No acute process.  2. 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  CERVICAL:    1. Significantly motion degraded exam demonstrates stable short-segment presumed demyelinating plaques of the upper cervical spine.  No acute process.  2. Degenerative changes at C3-C4 and C4-C5 are stable from the prior exam most notable for foraminal narrowing discussed above.      Electronically signed by: Fabricio Lala  Date:    09/15/2023  Time:    10:17    No results found for this or any previous visit.    Results for orders placed during the hospital encounter of 02/15/22    MRI Brain Demyelinating W W/O Contrast    Impression  1. Stable appearance of the brain with few foci of FLAIR and T2 hyperintense signal in the cerebral white matter is well as the brainstem.  There is a provided history of multiple sclerosis.  These findings would be consistent with epic provided diagnosis.  There are no new regions of signal abnormality in the brain.  There are no regions of restricted diffusion or abnormal enhancement to suggest interval progression of disease or active demyelination.  2. 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      Electronically signed by: Hossein Reyes MD  Date:    02/15/2022  Time:    07:40    Results for orders placed during the hospital encounter of 02/15/22    MRI Cervical Spine Demyelinating W W/O Contrast    Impression  1. Patient motion does limit evaluation.  There are regions of abnormal signal intensity in the cord.  These were present previously.  A lesion in the posterior cord at the level of C1-2 through superior C3 was present previously but appears slightly larger on the current study.  This may reflect mild interval progression of disease but there is no abnormal enhancement to suggest active demyelination.  Similarly, a lesion previously seen in  the left posterolateral cord at the level of C4 on C5 is without definite change.  Smaller lesions at the level of C3 or likely present on the most recent prior study but patient motion does limit evaluation.  2. There is no spinal stenosis or cord compression but foraminal narrowing at several levels due mostly due to uncovertebral spurring is without change and described above.      Electronically signed by: Hossein Reyes MD  Date:    02/15/2022  Time:    07:50    Results for orders placed during the hospital encounter of 05/28/21    MRI Thoracic Spine Demyelinating W W/O Contrast    Impression  1. There is a similar appearance of the thoracic spine and cord when compared to the prior study.  The images are degraded by patient body habitus and motion.  There is no fracture or malalignment.  The spinal canal is somewhat small on a developmental basis and there is prominent dorsal epidural fat (epidural lipomatosis).  These findings are unchanged.  There is no significant spinal canal or foraminal stenosis.  2. There is suggestion of abnormal T2 hyperintense signal in the left lateral cord at the level of T12.  This is better demonstrated and is more conspicuous on the comparison study.  There is no other definite region of signal abnormality in the thoracic cord.  There is no abnormal enhancement.      Electronically signed by: Hossein Reyes MD  Date:    05/28/2021  Time:    08:50        Labs: personally reviewed      Lab Results   Component Value Date    NRPHGTKX65DI 53 03/27/2024    [id number] 28 (L) 03/10/2023    TIMXSAEC42AU 16 (L) 04/19/2021     Lab Results   Component Value Date    JCVINDEX 0.20 (H) 05/20/2021    JCVANTIBODY INDETERMINATE (A) 05/20/2021     Lab Results   Component Value Date    CB9LNWJY 71.2 05/20/2021    ABSOLUTECD3 2308 (H) 05/20/2021    FV4PIAWM 16.0 05/20/2021    ABSOLUTECD8 519 05/20/2021    AO0BIBVJ 50.7 05/20/2021    ABSOLUTECD4 1643 (H) 05/20/2021    LABCD48 3.16 05/20/2021      Lab Results   Component Value Date    WBC 12.67 03/25/2025    RBC 5.24 03/25/2025    HGB 15.4 03/25/2025    HCT 48.7 03/25/2025    MCV 93 03/25/2025    MCH 29.4 03/25/2025    MCHC 31.6 (L) 03/25/2025    RDW 13.4 03/25/2025     03/25/2025    MPV 11.0 03/25/2025    GRAN 7.8 (H) 09/25/2024    GRAN 61.0 09/25/2024    LYMPH 26.9 03/25/2025    LYMPH 3.41 03/25/2025    MONO 7.3 03/25/2025    MONO 0.93 03/25/2025    EOS 2.5 03/25/2025    EOS 0.32 03/25/2025    BASO 0.10 09/25/2024    EOSINOPHIL 8.3 (H) 09/25/2024    BASOPHIL 0.7 03/25/2025    BASOPHIL 0.09 03/25/2025     Sodium   Date Value Ref Range Status   03/25/2025 137 136 - 145 mmol/L Final   09/25/2024 137 136 - 145 mmol/L Final     Potassium   Date Value Ref Range Status   03/25/2025 4.6 3.5 - 5.1 mmol/L Final   09/25/2024 4.3 3.5 - 5.1 mmol/L Final     Chloride   Date Value Ref Range Status   03/25/2025 103 95 - 110 mmol/L Final   09/25/2024 104 95 - 110 mmol/L Final     CO2   Date Value Ref Range Status   03/25/2025 27 23 - 29 mmol/L Final   09/25/2024 25 23 - 29 mmol/L Final     Glucose   Date Value Ref Range Status   09/25/2024 99 70 - 110 mg/dL Final     BUN   Date Value Ref Range Status   03/25/2025 11 6 - 20 mg/dL Final     Creatinine   Date Value Ref Range Status   03/25/2025 0.9 0.5 - 1.4 mg/dL Final     Calcium   Date Value Ref Range Status   03/25/2025 9.8 8.7 - 10.5 mg/dL Final   09/25/2024 9.8 8.7 - 10.5 mg/dL Final     Total Protein   Date Value Ref Range Status   09/25/2024 7.7 6.0 - 8.4 g/dL Final     Albumin   Date Value Ref Range Status   03/25/2025 4.2 3.5 - 5.2 g/dL Final   09/25/2024 4.1 3.5 - 5.2 g/dL Final     Total Bilirubin   Date Value Ref Range Status   09/25/2024 0.5 0.1 - 1.0 mg/dL Final     Comment:     For infants and newborns, interpretation of results should be based  on gestational age, weight and in agreement with clinical  observations.    Premature Infant recommended reference ranges:  Up to 24 hours.............<8.0  mg/dL  Up to 48 hours............<12.0 mg/dL  3-5 days..................<15.0 mg/dL  6-29 days.................<15.0 mg/dL       Bilirubin Total   Date Value Ref Range Status   03/25/2025 0.4 0.1 - 1.0 mg/dL Final     Comment:     For infants and newborns, interpretation of results should be based   on gestational age, weight and in agreement with clinical   observations.    Premature Infant recommended reference ranges:   0-24 hours:  <8.0 mg/dL   24-48 hours: <12.0 mg/dL   3-5 days:    <15.0 mg/dL   6-29 days:   <15.0 mg/dL     Alkaline Phosphatase   Date Value Ref Range Status   09/25/2024 45 (L) 55 - 135 U/L Final     ALP   Date Value Ref Range Status   03/25/2025 51 40 - 150 unit/L Final     AST   Date Value Ref Range Status   03/25/2025 49 (H) 11 - 45 unit/L Final   09/25/2024 57 (H) 10 - 40 U/L Final     ALT   Date Value Ref Range Status   03/25/2025 88 (H) 10 - 44 unit/L Final   09/25/2024 85 (H) 10 - 44 U/L Final     Anion Gap   Date Value Ref Range Status   03/25/2025 7 (L) 8 - 16 mmol/L Final     eGFR if    Date Value Ref Range Status   02/15/2022 >60.0 >60 mL/min/1.73 m^2 Final     eGFR if non    Date Value Ref Range Status   02/15/2022 >60.0 >60 mL/min/1.73 m^2 Final     Comment:     Calculation used to obtain the estimated glomerular filtration  rate (eGFR) is the CKD-EPI equation.        Lab Results   Component Value Date    HEPBSAG Non-Reactive 03/25/2025    HEPBSAB 91.66 09/01/2022    HEPBSAB Reactive 09/01/2022    HEPBCAB Non-Reactive 03/25/2025           MS Impression and Plan:     NEURO MULTIPLE SCLEROSIS IMPRESSION:   Number of relapses in the past year?:  0  Clinical Progression:  Clinically Stable  MRI Progression:  Stable  MS Classification:  Relapsing-Remitting MS  Current DMT: ocrelizumab  DMT:  No change in management  DMT comment:  He is aware of the risks associated with immunosuppressant therapy, including increased risk of infection.   Symptom  "Management:  No change in symptom management  Symptom Management comment: Agree with PCP's suggestion to increase Cymbalta to 60mg - will place new order.   Additional Impressions:   Patient with RRMS remaining stable on Ocrevus.  He does experience the wearing off effect - will make sure he is getting his infusion every 24 weeks vs every 6 months  Safety labs reviewed - discussed with patient to follow up with PCP for elevated LFTs (may be fatty liver given his HLD, no contraindication with Ocrevus)  MRIs soon  Follow up in [facility] in 3 months to assess outside of the "crap gap"   Plan discussed and questions were answered to satisfaction.     Problem List Items Addressed This Visit       Multiple sclerosis - Primary (Chronic)    Relevant Medications    DULoxetine (CYMBALTA) 60 MG capsule    oxybutynin (DITROPAN-XL) 10 MG 24 hr tablet    cholecalciferol, vitamin D3, 1,250 mcg (50,000 unit) capsule    Other Relevant Orders    MRI Brain Demyelinating Without Contrast    CBC Auto Differential    Comprehensive Metabolic Panel    Hepatitis B Core Antibody, Total    Hepatitis B Surface Antigen    Immunoglobulins (IgG, IgA, IgM) Quantitative     Other Visit Diagnoses         Neuropathic pain        Relevant Medications    DULoxetine (CYMBALTA) 60 MG capsule      OAB (overactive bladder)        Relevant Medications    oxybutynin (DITROPAN-XL) 10 MG 24 hr tablet      Vitamin D deficiency        Relevant Medications    cholecalciferol, vitamin D3, 1,250 mcg (50,000 unit) capsule             I spent a total of 40 minutes on the day of the visit.This includes face to face time and non-face to face time preparing to see the patient (eg, review of tests), obtaining and/or reviewing separately obtained history, documenting clinical information in the electronic or other health record, independently interpreting results and communicating results to the patient/family/caregiver, or care coordinator.       Viky Solano, " FNP-C         [1]   Current Outpatient Medications on File Prior to Visit   Medication Sig Dispense Refill    gabapentin (NEURONTIN) 300 MG capsule TAKE 1 CAPSULE (300 MG TOTAL) BY MOUTH EVERY EVENING. OK TO TAKE WITH GABAPENTIN 1200MG *THANK YOU* 90 capsule 1    gabapentin (NEURONTIN) 600 MG tablet Take 2 tablets (1,200 mg total) by mouth every evening. Total of 1500 mg 180 tablet 1    ocrelizumab (OCREVUS) 30 mg/mL Soln Infuse Ocrevus 600mg in 500mL of 0.9% NaCl IV every 24 weeks. Pre-meds: Solumedrol 100mg IVPB, Benadryl 50mg IVP, Tylenol 1000mg PO, Pepcid 20mg IVP. 20 mL 1    sildenafiL (VIAGRA) 50 MG tablet Take 1 tablet (50 mg total) by mouth daily as needed for Erectile Dysfunction. TAKE 1 TABLET DAILY AS NEEDED FOR ERECTILE DYSFUNCTION 90 tablet 2    [DISCONTINUED] cholecalciferol, vitamin D3, 1,250 mcg (50,000 unit) capsule TAKE ONE CAPSULE EVERY WEEK ON THE SAME DAY 12 capsule 3    [DISCONTINUED] DULoxetine (CYMBALTA) 30 MG capsule Take 1 capsule (30 mg total) by mouth once daily. 90 capsule 1    [DISCONTINUED] oxybutynin (DITROPAN) 5 MG Tab Take 1 tablet (5 mg total) by mouth every evening. 90 tablet 1     No current facility-administered medications on file prior to visit.

## 2025-05-13 ENCOUNTER — PATIENT MESSAGE (OUTPATIENT)
Dept: PSYCHIATRY | Facility: CLINIC | Age: 30
End: 2025-05-13
Payer: COMMERCIAL

## 2025-05-20 ENCOUNTER — TELEPHONE (OUTPATIENT)
Dept: NEUROLOGY | Facility: CLINIC | Age: 30
End: 2025-05-20
Payer: COMMERCIAL

## 2025-06-19 ENCOUNTER — PATIENT MESSAGE (OUTPATIENT)
Dept: PSYCHIATRY | Facility: CLINIC | Age: 30
End: 2025-06-19
Payer: COMMERCIAL

## 2025-06-22 ENCOUNTER — HOSPITAL ENCOUNTER (OUTPATIENT)
Dept: RADIOLOGY | Facility: HOSPITAL | Age: 30
Discharge: HOME | End: 2025-06-22
Payer: COMMERCIAL

## 2025-06-22 DIAGNOSIS — G35 MULTIPLE SCLEROSIS: ICD-10-CM

## 2025-06-22 PROCEDURE — 70551 MRI BRAIN STEM W/O DYE: CPT | Mod: TC

## 2025-06-22 PROCEDURE — 70551 MRI BRAIN STEM W/O DYE: CPT | Mod: 26,,, | Performed by: RADIOLOGY

## 2025-07-11 ENCOUNTER — OFFICE VISIT (OUTPATIENT)
Dept: NEUROLOGY | Facility: CLINIC | Age: 30
End: 2025-07-11
Payer: COMMERCIAL

## 2025-07-11 VITALS
SYSTOLIC BLOOD PRESSURE: 144 MMHG | WEIGHT: 315 LBS | BODY MASS INDEX: 46.26 KG/M2 | DIASTOLIC BLOOD PRESSURE: 87 MMHG | HEART RATE: 97 BPM

## 2025-07-11 DIAGNOSIS — N32.81 OAB (OVERACTIVE BLADDER): ICD-10-CM

## 2025-07-11 DIAGNOSIS — G47.33 OSA (OBSTRUCTIVE SLEEP APNEA): ICD-10-CM

## 2025-07-11 DIAGNOSIS — G35 MULTIPLE SCLEROSIS: Primary | Chronic | ICD-10-CM

## 2025-07-11 PROBLEM — Q07.8 ANOMALOUS OPTIC NERVE: Status: RESOLVED | Noted: 2021-07-09 | Resolved: 2025-07-11

## 2025-07-11 PROCEDURE — 99999 PR PBB SHADOW E&M-EST. PATIENT-LVL IV: CPT | Mod: PBBFAC,,,

## 2025-07-11 RX ORDER — MIRABEGRON 25 MG/1
25 TABLET, FILM COATED, EXTENDED RELEASE ORAL DAILY
Qty: 90 TABLET | Refills: 1 | Status: SHIPPED | OUTPATIENT
Start: 2025-07-11

## 2025-07-11 NOTE — PROGRESS NOTES
Patient ID: Christiano Aguayo is a 30 y.o. male who presents today for a routine clinic visit for MS.  He was last seen on 4/10/2025.  The history was provided by the patient. He is accompanied by his wife, Becky.     NEURO MULTIPLE SCLEROISIS SUMMARY:   Principle neurological diagnosis:  MS  Date of Symptom Onset:  11/2019  Date of Diagnosis:  5/2021  Disease type at diagnosis:  Relapsing-Remitting MS  Disease type currently:  Relapsing-Remitting MS  Previous Therapy:  First DMT:  None  Current Therapy:  Ocrelizumab - 9/2021 - present   Last MRI Brain:  6/22/2025 - stable  Last MRI C-Spine:  9/15/2023 - stable  Last MRI T-Spine:  5/28/2021 - one lesion  Labs   No CSF completed  Date JCV Completed:  5/20/2021  JCV Index:  0.2  JCV Antibody:  Negative  Other relevant labs and studies:    5/20/2021: NMO and MOG - negative   3/27/2024: vitamin D - 53, NfL - <3.0      Subjective:     He states that he has been experiencing a dull joint pain, mainly in his shoulders and arms.  He says it worsens at night. He does not think the pain is caused from muscle spasms.  He states that he is physically active during the day, but does not have a specific exercise or stretching routine. He would not like to start muscle relaxers.     He was recently sick and experienced extreme fatigue during that time and after the cold subsided.  He states that he did get IV fluids with electrolytes and vitamins (possibly from [facility]) and it made him feel better.     He states that he does have significant heat sensitivity, so he will wait to do any outside home work (cutting the grass) until after the sun goes down.  He will do multiple tasks at night before bed and it will push his bedtime back to 2am some nights.  So he is not getting enough sleep.  He is also still waking up multiple times a night to urinate. He says the increase of oxybutynin did not help at all, he just stopped taking it altogether. He also have diagnosed CORINA and is prescribed  a CPAP that he does not use because he cannot fix the settings of the machine and he ends up taking the mask off during the night.     He states that he continues to take Cymbalta 60mg and feels that is a good dose.  He has missed a few days of it before and he will become angry, but when he is on it, it keeps him calm and not so easily irritated.     He does not stay very hydrated.  He does try to drink when he is active and sweating, but he does not make it a habit to drink when he is just at home.     He did get a new PCP, but is still needing to follow up regarding his elevated LFTs.     The above symptoms are not new symptoms and are just symptoms that are needing better management.     He is continuing to tolerate Ocrevus.     ROS:      9/24/2024     8:11 PM   REVIEW OF SYMPTOMS   Do you feel abnormally tired on most days? Yes   Do you feel you generally sleep well? No   Do you have difficulty controlling your bladder?  Yes   Do you have difficulty controlling your bowels?  Yes   Do you have frequent muscle cramps, tightness or spasms in your limbs?  No   Do you have new visual symptoms?  No   Do you have worsening difficulty with your memory or thinking? No   Do you have worsening symptoms of anxiety or depression?  No   For patients who walk, Do you have more difficulty walking?  No   Have you fallen since your last visit?  No   For patients who use wheelchairs: Do you have any skin wounds or breakdown? Not Applicable   Do you have difficulty using your hands?  No   Do you have shooting or burning pain? Yes   Do you have difficulty with sexual function?  Yes   If you are sexually active, are you using birth control? Y/N  N/A No   Do you often choke when swallowing liquids or solid food?  No   Do you experience worsening symptoms when overheated? Yes   Do you need any new equipment such as a wheelchair, walker or shower chair? No   Do you receive co-pay financial assistance for your principal MS medicine? No    Would you be interested in participating in an MS research trial in the future? No   For patients on Gilenya, Tecfidera, Aubagio, Rituxan, Ocrevus, Tysabri, Lemtrada or Methotrexate, are you aware that you should NOT receive live virus vaccines?  Yes   Do you feel you have adequate family/friend support?  Yes   Do you have health insurance?   Yes   Are you currently employed? Yes   Do you receive SSDI/SSI?  Not Applicable   Do you use marijuana or cannabis products? No   Have you been diagnosed with a urinary tract infection since your last visit here? No   Have you been diagnosed with a respiratory tract infection since your last visit here? No   Have you been to the emergency room since your last visit here? No   Have you been hospitalized since your last visit here?  No            9/24/2024     8:16 PM   FSS SCORE & INTERPRETATION   FSS SCORE  9   FSS SCORE INTERPRETATION May not be suffering from fatigue         9/24/2024     8:15 PM   MS CANDICE-D SCORE & INTERPRETATION   CANDCIE-D SCORE  4   CANDICE-D INTERPRETATION  No indication of Depression         9/24/2024     8:12 PM   MS GEORGINA-7 SCORE & INTERPRETATION   GEORGINA-7 SCORE  1   GEORGINA-7 SCORE INTERPRETATION Normal         9/24/2024     8:17 PM   PEQ MS MOS PAIN EFFECTS SCORE & INTERPRETATION   PES SCORE 7   PES SCORE INTERPRETATION Scores can range from 6-30.  Items are scaled so that higher scores indicate a greater impact of pain on a patients mood and behavior.         9/24/2024     8:18 PM   PEQ MS SEXUAL SATISFACTION SCORE & INTERPRETATION   SSS SCORE  8   SSS SCORE INTERPRETATION Scores can range from 4-24.  Higher scores indicate greater problems with sexual satisfaction.         9/24/2024     8:20 PM   MS BLADDER CONTROL SCORE & INTERPRETATION   BLCS SCORE 18   BLCS SCORE INTERPRETATION  Scores can range from 0-22, with higher scores indicating greater bladder control problems.         9/24/2024     8:23 PM   MS BOWEL CONTROL SCORE & INTERPRETATION   BWCS SCORE 9    BWCS SCORE INTERPRETATION Scores can range from 0-26, with higher scores indicating greater bowel control problems.         9/24/2024     8:20 PM   PEQ MS IMPACT OF VISUAL IMPAIRMENT SCORE & INTERPRETATION   ESTHER SCALE SCORE  0   ESTHER SCORE INTERPRETATION Scores can range from 0-15, with higher scores indicating greater impact of visual problems on daily activites.         9/24/2024     8:22 PM   MS PDQ SCORE & INTERPRETATION   PDQ RETROSPECTIVE MEMORY SUBSCALE 0   PDQ ATTENTION/CONCENTRATION SUBSCALE 0   PDQ PROSPECTIVE MEMORY SUBSCALE 0   PDQ PLANNING/ORGANIZATION SUBSCALE 1   PDQ TOTAL SCORE 1   PDQ SCORE INTERPRETATION Scores can range from 0-80, with higher scores indicating greater perceived cognitive impairment.         9/24/2024     8:24 PM   MSSS SCORE & INTERPRETATION   MSSS TANGIBLE SUPPORT SUBSCALE 68.75   MSSS EMOTIONAL/INFORMATIONAL SUPPORT SUBSCALE 56.25   MSSS AFFECTIONATE SUPPORT SUBSCALE 75   MSSS POSITIVE SOCIAL INTERACTION SUBSCALE 75   MSSS TOTAL SCORE 68.75   MSSS SCORE INTERPRETATION Scores can range from 0-100, with higher scores indicating greater perceived support.        SOCIAL HISTORY  Living arrangements - the patient lives with their family.  Social History     Socioeconomic History    Marital status:    Tobacco Use    Smoking status: Never    Smokeless tobacco: Current     Types: Snuff   Substance and Sexual Activity    Alcohol use: Yes     Comment: rarely    Drug use: Never    Sexual activity: Yes     Partners: Female     Birth control/protection: None     Social Drivers of Health     Financial Resource Strain: Medium Risk (1/29/2025)    Overall Financial Resource Strain (CARDIA)     Difficulty of Paying Living Expenses: Somewhat hard   Food Insecurity: No Food Insecurity (1/29/2025)    Hunger Vital Sign     Worried About Running Out of Food in the Last Year: Never true     Ran Out of Food in the Last Year: Never true   Transportation Needs: No Transportation Needs  (1/16/2024)    PRAPARE - Transportation     Lack of Transportation (Medical): No     Lack of Transportation (Non-Medical): No   Physical Activity: Sufficiently Active (1/29/2025)    Exercise Vital Sign     Days of Exercise per Week: 7 days     Minutes of Exercise per Session: 60 min   Stress: No Stress Concern Present (1/29/2025)    [employer] - Occupational Stress Questionnaire     Feeling of Stress : Not at all   Housing Stability: Low Risk  (1/16/2024)    Housing Stability Vital Sign     Unable to Pay for Housing in the Last Year: No     Number of Places Lived in the Last Year: 1     Unstable Housing in the Last Year: No       Medications Ordered Prior to Encounter[1]    Objective:     1. 25 foot timed walk:      3/27/2024     9:20 AM 9/25/2024    10:30 AM   Timed 25 Foot Walk:   Did patient wear an AFO? No No   Was assistive device used? No No   Time for 25 Foot Walk (seconds) 3.95 3.83   Time for 25 Foot Walk (seconds) 4.03        2. SDMT       No data to display                       NEURO EXAM    In general, the patient is well nourished and appears to be in no acute distress.    MENTAL STATUS: language is fluent, normal verbal comprehension, short-term and remote memory is intact, attention is normal, patient is alert and oriented x 3, fund of knowlege is appropriate by vocabulary.     CRANIAL NERVE EXAM:  There is no internuclear ophthalmoplegia.  Extraocular muscles are intact. No facial asymmetry. Facial sensation is intact bilaterally. There is no dysarthria. Uvula is midline, and palate moves symmetrically. Shoulder shrug intact bilaterally. Tongue protrusion is midline. Hearing is intact to finger rub bilaterally. Neck is supple with full ROM    MOTOR EXAM: Normal bulk and tone throughout UE and LE bilaterally. Rapid sequential movements are normal; Strength is  5/5 in all groups in the lower extremities and upper extremities    REFLEXES: 2+ and symmetric throughout in all  four extremities, except 3+ at patella     SENSORY EXAM: Normal to light touch and vibration throughout.    COORDINATION: Normal finger-to-nose exam. Normal heel-to-shin exam.       Imaging: personally reviewed     Results for orders placed during the hospital encounter of 06/22/25    MRI Brain Demyelinating Without Contrast    Impression  1. Minimal periventricular deep white matter FLAIR T2 hyperintensities noted, which could reflect demyelinating plaques in the setting of multiple sclerosis.  2. Chronic lacunar infarct of the left basal ganglia.      Electronically signed by: Bony Norris  Date:    06/22/2025  Time:    09:55    Results for orders placed during the hospital encounter of 09/15/23    MRI Cervical Spine Demyelinating Without Contrast    Impression  BRAIN:    1. Significantly motion degraded exam demonstrates no gross interval change again with findings that would be consistent with patient's clinical history of an underlying demyelinating process with mild plaque burden.  No acute process.  2. 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  CERVICAL:    1. Significantly motion degraded exam demonstrates stable short-segment presumed demyelinating plaques of the upper cervical spine.  No acute process.  2. Degenerative changes at C3-C4 and C4-C5 are stable from the prior exam most notable for foraminal narrowing discussed above.      Electronically signed by: Fabricio Lala  Date:    09/15/2023  Time:    10:17    No results found for this or any previous visit.    Results for orders placed during the hospital encounter of 02/15/22    MRI Brain Demyelinating W W/O Contrast    Impression  1. Stable appearance of the brain with few foci of FLAIR and T2 hyperintense signal in the cerebral white matter is well as the brainstem.  There is a provided history of multiple sclerosis.  These findings would be consistent with epic provided  diagnosis.  There are no new regions of signal abnormality in the brain.  There are no regions of restricted diffusion or abnormal enhancement to suggest interval progression of disease or active demyelination.  2. 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      Electronically signed by: Hossein Reyes MD  Date:    02/15/2022  Time:    07:40    Results for orders placed during the hospital encounter of 02/15/22    MRI Cervical Spine Demyelinating W W/O Contrast    Impression  1. Patient motion does limit evaluation.  There are regions of abnormal signal intensity in the cord.  These were present previously.  A lesion in the posterior cord at the level of C1-2 through superior C3 was present previously but appears slightly larger on the current study.  This may reflect mild interval progression of disease but there is no abnormal enhancement to suggest active demyelination.  Similarly, a lesion previously seen in the left posterolateral cord at the level of C4 on C5 is without definite change.  Smaller lesions at the level of C3 or likely present on the most recent prior study but patient motion does limit evaluation.  2. There is no spinal stenosis or cord compression but foraminal narrowing at several levels due mostly due to uncovertebral spurring is without change and described above.      Electronically signed by: Hossein Reyes MD  Date:    02/15/2022  Time:    07:50    Results for orders placed during the hospital encounter of 05/28/21    MRI Thoracic Spine Demyelinating W W/O Contrast    Impression  1. There is a similar appearance of the thoracic spine and cord when compared to the prior study.  The images are degraded by patient body habitus and motion.  There is no fracture or malalignment.  The spinal canal is somewhat small  on a developmental basis and there is prominent dorsal epidural fat (epidural lipomatosis).  These findings are unchanged.  There is no significant spinal canal or foraminal stenosis.  2. There is suggestion of abnormal T2 hyperintense signal in the left lateral cord at the level of T12.  This is better demonstrated and is more conspicuous on the comparison study.  There is no other definite region of signal abnormality in the thoracic cord.  There is no abnormal enhancement.      Electronically signed by: Hossein Reyes MD  Date:    05/28/2021  Time:    08:50        Labs: personally reviewed      Lab Results   Component Value Date    HUQQIPGC04HD 53 03/27/2024    XRGQKOSZ61YC 28 (L) 03/10/2023    GKMVNTED52DZ 16 (L) 04/19/2021     Lab Results   Component Value Date    JCVINDEX 0.20 (H) 05/20/2021    JCVANTIBODY INDETERMINATE (A) 05/20/2021     Lab Results   Component Value Date    MM7ZYYDT 71.2 05/20/2021    ABSOLUTECD3 2308 (H) 05/20/2021    JQ4PRCTB 16.0 05/20/2021    ABSOLUTECD8 519 05/20/2021    WC8XEYQA 50.7 05/20/2021    ABSOLUTECD4 1643 (H) 05/20/2021    LABCD48 3.16 05/20/2021     Lab Results   Component Value Date    WBC 12.67 03/25/2025    RBC 5.24 03/25/2025    HGB 15.4 03/25/2025    HCT 48.7 03/25/2025    MCV 93 03/25/2025    MCH 29.4 03/25/2025    MCHC 31.6 (L) 03/25/2025    RDW 13.4 03/25/2025     03/25/2025    MPV 11.0 03/25/2025    GRAN 7.8 (H) 09/25/2024    GRAN 61.0 09/25/2024    LYMPH 26.9 03/25/2025    LYMPH 3.41 03/25/2025    MONO 7.3 03/25/2025    MONO 0.93 03/25/2025    EOS 2.5 03/25/2025    EOS 0.32 03/25/2025    BASO 0.10 09/25/2024    EOSINOPHIL 8.3 (H) 09/25/2024    BASOPHIL 0.7 03/25/2025    BASOPHIL 0.09 03/25/2025     Sodium   Date Value Ref Range Status   03/25/2025 137 136 - 145 mmol/L Final   09/25/2024 137 136 - 145 mmol/L Final     Potassium   Date Value Ref Range Status   03/25/2025 4.6 3.5 - 5.1 mmol/L Final   09/25/2024 4.3 3.5 - 5.1 mmol/L Final     Chloride   Date  Value Ref Range Status   03/25/2025 103 95 - 110 mmol/L Final   09/25/2024 104 95 - 110 mmol/L Final     CO2   Date Value Ref Range Status   03/25/2025 27 23 - 29 mmol/L Final   09/25/2024 25 23 - 29 mmol/L Final     Glucose   Date Value Ref Range Status   03/25/2025 94 70 - 110 mg/dL Final   09/25/2024 99 70 - 110 mg/dL Final     BUN   Date Value Ref Range Status   03/25/2025 11 6 - 20 mg/dL Final     Creatinine   Date Value Ref Range Status   03/25/2025 0.9 0.5 - 1.4 mg/dL Final     Calcium   Date Value Ref Range Status   03/25/2025 9.8 8.7 - 10.5 mg/dL Final   09/25/2024 9.8 8.7 - 10.5 mg/dL Final     Protein Total   Date Value Ref Range Status   03/25/2025 8.1 6.0 - 8.4 gm/dL Final     Total Protein   Date Value Ref Range Status   09/25/2024 7.7 6.0 - 8.4 g/dL Final     Albumin   Date Value Ref Range Status   03/25/2025 4.2 3.5 - 5.2 g/dL Final   09/25/2024 4.1 3.5 - 5.2 g/dL Final     Total Bilirubin   Date Value Ref Range Status   09/25/2024 0.5 0.1 - 1.0 mg/dL Final     Comment:     For infants and newborns, interpretation of results should be based  on gestational age, weight and in agreement with clinical  observations.    Premature Infant recommended reference ranges:  Up to 24 hours.............<8.0 mg/dL  Up to 48 hours............<12.0 mg/dL  3-5 days..................<15.0 mg/dL  6-29 days.................<15.0 mg/dL       Bilirubin Total   Date Value Ref Range Status   03/25/2025 0.4 0.1 - 1.0 mg/dL Final     Comment:     For infants and newborns, interpretation of results should be based   on gestational age, weight and in agreement with clinical   observations.    Premature Infant recommended reference ranges:   0-24 hours:  <8.0 mg/dL   24-48 hours: <12.0 mg/dL   3-5 days:    <15.0 mg/dL   6-29 days:   <15.0 mg/dL     Alkaline Phosphatase   Date Value Ref Range Status   09/25/2024 45 (L) 55 - 135 U/L Final     ALP   Date Value Ref Range Status   03/25/2025 51 40 - 150 unit/L Final     AST   Date  Value Ref Range Status   03/25/2025 49 (H) 11 - 45 unit/L Final   09/25/2024 57 (H) 10 - 40 U/L Final     ALT   Date Value Ref Range Status   03/25/2025 88 (H) 10 - 44 unit/L Final   09/25/2024 85 (H) 10 - 44 U/L Final     Anion Gap   Date Value Ref Range Status   03/25/2025 7 (L) 8 - 16 mmol/L Final     eGFR if    Date Value Ref Range Status   02/15/2022 >60.0 >60 mL/min/1.73 m^2 Final     eGFR if non    Date Value Ref Range Status   02/15/2022 >60.0 >60 mL/min/1.73 m^2 Final     Comment:     Calculation used to obtain the estimated glomerular filtration  rate (eGFR) is the CKD-EPI equation.        Lab Results   Component Value Date    HEPBSAG Non-Reactive 03/25/2025    HEPBSAB 91.66 09/01/2022    HEPBSAB Reactive 09/01/2022    HEPBCAB Non-Reactive 03/25/2025           MS Impression and Plan:     NEURO MULTIPLE SCLEROSIS IMPRESSION:   Number of relapses in the past year?:  0  Clinical Progression:  Clinically Stable  MRI Progression:  Stable  MS Classification:  Relapsing-Remitting MS  Current DMT: ocrelizumab  DMT:  No change in management  DMT comment:  He is aware of the risks associated with immunosuppressant therapy, including increased risk of infection.   Symptom Management:  Implement change in symptom management  Implement Change in Symptom Management:  Sleep, Bladder and Pain  Implement Change in Symptom Management comment:   BLADDER: discontinue oxybutynin and start Myrbetriq. May need urology referral in the future. May also need to discuss DM2 with PCP.   SLEEP: refer to sleep clinic as previous sleep study was done many years ago and may need new machine or different mask options. Also, discussed better sleep hygiene - having more consistent sleep schedule and limiting evening task to not go past a certain time, limiting evening fluid intake, and hopefully the change in OAB medication will also help.   PAIN: will start nightly magnesium - this may help with sleep as  well. Also discussed the importance of exercise and stretching.   Additional Impressions:   Patient with RRMS remaining clinically and radiologically stable on Ocrevus   Discussed the importance of following up with PCP for elevated LFTs and lipid panel. Recommended lifestyle changes such as exercise and Mediterranean diet to help with overall health and MS as well.   Safety labs scheduled in August  Recent MRIs reviewed and are stable - repeat yearly  Follow up in 6 months with Dr. Ramesh   Plan discussed and questions were answered to satisfaction.     Problem List Items Addressed This Visit       Multiple sclerosis - Primary (Chronic)    Relevant Medications    mirabegron (MYRBETRIQ) 25 mg Tb24 ER tablet    Other Relevant Orders    Ambulatory referral/consult to Sleep Disorders     Other Visit Diagnoses         CORINA (obstructive sleep apnea)        Relevant Orders    Ambulatory referral/consult to Sleep Disorders      OAB (overactive bladder)        Relevant Medications    mirabegron (MYRBETRIQ) 25 mg Tb24 ER tablet               I spent a total of 75 minutes on the day of the visit.This includes face to face time and non-face to face time preparing to see the patient (eg, review of tests), obtaining and/or reviewing separately obtained history, documenting clinical information in the electronic or other health record, independently interpreting results and communicating results to the patient/family/caregiver, or care coordinator.    This note was generated with the assistance of ambient listening technology. Verbal consent was obtained by the patient and accompanying visitor(s) for the recording of patient appointment to facilitate this note. I attest to having reviewed and edited the generated note for accuracy, though some syntax or spelling errors may persist. Please contact the author of this note for any clarification.       Visit today included increased complexity associated with the care of the episodic  problems listed above addressed and managing the longitudinal care of the patient due to the serious and/or complex managed problem(s) listed above.         KRYSTAL Crespo         [1]   Current Outpatient Medications on File Prior to Visit   Medication Sig Dispense Refill    cholecalciferol, vitamin D3, 1,250 mcg (50,000 unit) capsule TAKE ONE CAPSULE EVERY WEEK ON THE SAME DAY 12 capsule 3    DULoxetine (CYMBALTA) 60 MG capsule Take 1 capsule (60 mg total) by mouth once daily. 90 capsule 1    gabapentin (NEURONTIN) 300 MG capsule TAKE 1 CAPSULE (300 MG TOTAL) BY MOUTH EVERY EVENING. OK TO TAKE WITH GABAPENTIN 1200MG *THANK YOU* 90 capsule 1    gabapentin (NEURONTIN) 600 MG tablet Take 2 tablets (1,200 mg total) by mouth every evening. Total of 1500 mg 180 tablet 1    ocrelizumab (OCREVUS) 30 mg/mL Soln Infuse Ocrevus 600mg in 500mL of 0.9% NaCl IV every 24 weeks. Pre-meds: Solumedrol 100mg IVPB, Benadryl 50mg IVP, Tylenol 1000mg PO, Pepcid 20mg IVP. 20 mL 1    sildenafiL (VIAGRA) 50 MG tablet Take 1 tablet (50 mg total) by mouth daily as needed for Erectile Dysfunction. TAKE 1 TABLET DAILY AS NEEDED FOR ERECTILE DYSFUNCTION 90 tablet 2    [DISCONTINUED] oxybutynin (DITROPAN-XL) 10 MG 24 hr tablet Take 1 tablet (10 mg total) by mouth once daily. (Patient not taking: Reported on 7/11/2025) 90 tablet 1     No current facility-administered medications on file prior to visit.

## 2025-07-30 ENCOUNTER — PATIENT MESSAGE (OUTPATIENT)
Dept: PSYCHIATRY | Facility: CLINIC | Age: 30
End: 2025-07-30
Payer: COMMERCIAL

## 2025-08-01 ENCOUNTER — PATIENT MESSAGE (OUTPATIENT)
Dept: PSYCHIATRY | Facility: CLINIC | Age: 30
End: 2025-08-01
Payer: COMMERCIAL